# Patient Record
Sex: MALE | Race: WHITE | Employment: UNEMPLOYED | URBAN - METROPOLITAN AREA
[De-identification: names, ages, dates, MRNs, and addresses within clinical notes are randomized per-mention and may not be internally consistent; named-entity substitution may affect disease eponyms.]

---

## 2022-12-29 PROCEDURE — 99285 EMERGENCY DEPT VISIT HI MDM: CPT

## 2022-12-30 ENCOUNTER — APPOINTMENT (OUTPATIENT)
Dept: GENERAL RADIOLOGY | Age: 67
DRG: 313 | End: 2022-12-30
Payer: MEDICARE

## 2022-12-30 ENCOUNTER — HOSPITAL ENCOUNTER (INPATIENT)
Age: 67
LOS: 1 days | Discharge: HOME | DRG: 313 | End: 2023-01-02
Attending: INTERNAL MEDICINE | Admitting: INTERNAL MEDICINE
Payer: MEDICARE

## 2022-12-30 DIAGNOSIS — R07.9 CHEST PAIN, UNSPECIFIED TYPE: Primary | ICD-10-CM

## 2022-12-30 DIAGNOSIS — R77.8 ELEVATED TROPONIN: ICD-10-CM

## 2022-12-30 LAB
A/G RATIO: 1 (ref 1.1–2.2)
ALBUMIN SERPL-MCNC: 3.9 G/DL (ref 3.4–5)
ALP BLD-CCNC: 82 U/L (ref 40–129)
ALT SERPL-CCNC: 18 U/L (ref 10–40)
ANION GAP SERPL CALCULATED.3IONS-SCNC: 14 MMOL/L (ref 3–16)
AST SERPL-CCNC: 19 U/L (ref 15–37)
BASOPHILS ABSOLUTE: 0 K/UL (ref 0–0.2)
BASOPHILS RELATIVE PERCENT: 0.4 %
BILIRUB SERPL-MCNC: 0.9 MG/DL (ref 0–1)
BUN BLDV-MCNC: 19 MG/DL (ref 7–20)
CALCIUM SERPL-MCNC: 9.6 MG/DL (ref 8.3–10.6)
CHLORIDE BLD-SCNC: 96 MMOL/L (ref 99–110)
CO2: 25 MMOL/L (ref 21–32)
CREAT SERPL-MCNC: 1.1 MG/DL (ref 0.8–1.3)
EKG ATRIAL RATE: 72 BPM
EKG DIAGNOSIS: NORMAL
EKG P AXIS: 42 DEGREES
EKG P-R INTERVAL: 218 MS
EKG Q-T INTERVAL: 410 MS
EKG QRS DURATION: 94 MS
EKG QTC CALCULATION (BAZETT): 448 MS
EKG R AXIS: -14 DEGREES
EKG T AXIS: 44 DEGREES
EKG VENTRICULAR RATE: 72 BPM
EOSINOPHILS ABSOLUTE: 0.3 K/UL (ref 0–0.6)
EOSINOPHILS RELATIVE PERCENT: 3.3 %
GFR SERPL CREATININE-BSD FRML MDRD: >60 ML/MIN/{1.73_M2}
GLUCOSE BLD-MCNC: 107 MG/DL (ref 70–99)
GLUCOSE BLD-MCNC: 146 MG/DL (ref 70–99)
GLUCOSE BLD-MCNC: 84 MG/DL (ref 70–99)
GLUCOSE BLD-MCNC: 88 MG/DL (ref 70–99)
GLUCOSE BLD-MCNC: 94 MG/DL (ref 70–99)
HCT VFR BLD CALC: 45.4 % (ref 40.5–52.5)
HEMOGLOBIN: 14.8 G/DL (ref 13.5–17.5)
LYMPHOCYTES ABSOLUTE: 1.5 K/UL (ref 1–5.1)
LYMPHOCYTES RELATIVE PERCENT: 18.2 %
MCH RBC QN AUTO: 27.9 PG (ref 26–34)
MCHC RBC AUTO-ENTMCNC: 32.7 G/DL (ref 31–36)
MCV RBC AUTO: 85.5 FL (ref 80–100)
MONOCYTES ABSOLUTE: 0.7 K/UL (ref 0–1.3)
MONOCYTES RELATIVE PERCENT: 8.5 %
NEUTROPHILS ABSOLUTE: 5.9 K/UL (ref 1.7–7.7)
NEUTROPHILS RELATIVE PERCENT: 69.6 %
PDW BLD-RTO: 14.5 % (ref 12.4–15.4)
PERFORMED ON: ABNORMAL
PERFORMED ON: ABNORMAL
PERFORMED ON: NORMAL
PERFORMED ON: NORMAL
PLATELET # BLD: 261 K/UL (ref 135–450)
PMV BLD AUTO: 7.7 FL (ref 5–10.5)
POTASSIUM SERPL-SCNC: 3.5 MMOL/L (ref 3.5–5.1)
PRO-BNP: 37 PG/ML (ref 0–124)
RBC # BLD: 5.31 M/UL (ref 4.2–5.9)
SODIUM BLD-SCNC: 135 MMOL/L (ref 136–145)
TOTAL PROTEIN: 7.9 G/DL (ref 6.4–8.2)
TROPONIN: 0.02 NG/ML
WBC # BLD: 8.4 K/UL (ref 4–11)

## 2022-12-30 PROCEDURE — 84484 ASSAY OF TROPONIN QUANT: CPT

## 2022-12-30 PROCEDURE — 6370000000 HC RX 637 (ALT 250 FOR IP): Performed by: NURSE PRACTITIONER

## 2022-12-30 PROCEDURE — 83880 ASSAY OF NATRIURETIC PEPTIDE: CPT

## 2022-12-30 PROCEDURE — 85025 COMPLETE CBC W/AUTO DIFF WBC: CPT

## 2022-12-30 PROCEDURE — 6370000000 HC RX 637 (ALT 250 FOR IP): Performed by: INTERNAL MEDICINE

## 2022-12-30 PROCEDURE — 78452 HT MUSCLE IMAGE SPECT MULT: CPT | Performed by: INTERNAL MEDICINE

## 2022-12-30 PROCEDURE — 96375 TX/PRO/DX INJ NEW DRUG ADDON: CPT

## 2022-12-30 PROCEDURE — A9502 TC99M TETROFOSMIN: HCPCS | Performed by: INTERNAL MEDICINE

## 2022-12-30 PROCEDURE — 71045 X-RAY EXAM CHEST 1 VIEW: CPT

## 2022-12-30 PROCEDURE — 36415 COLL VENOUS BLD VENIPUNCTURE: CPT

## 2022-12-30 PROCEDURE — 93017 CV STRESS TEST TRACING ONLY: CPT | Performed by: INTERNAL MEDICINE

## 2022-12-30 PROCEDURE — 93005 ELECTROCARDIOGRAM TRACING: CPT | Performed by: EMERGENCY MEDICINE

## 2022-12-30 PROCEDURE — 2580000003 HC RX 258: Performed by: INTERNAL MEDICINE

## 2022-12-30 PROCEDURE — 3430000000 HC RX DIAGNOSTIC RADIOPHARMACEUTICAL: Performed by: INTERNAL MEDICINE

## 2022-12-30 PROCEDURE — 6360000002 HC RX W HCPCS: Performed by: INTERNAL MEDICINE

## 2022-12-30 PROCEDURE — 80053 COMPREHEN METABOLIC PANEL: CPT

## 2022-12-30 PROCEDURE — 93010 ELECTROCARDIOGRAM REPORT: CPT | Performed by: INTERNAL MEDICINE

## 2022-12-30 PROCEDURE — G0378 HOSPITAL OBSERVATION PER HR: HCPCS

## 2022-12-30 RX ORDER — DEXTROSE MONOHYDRATE 100 MG/ML
INJECTION, SOLUTION INTRAVENOUS CONTINUOUS PRN
Status: DISCONTINUED | OUTPATIENT
Start: 2022-12-30 | End: 2023-01-02 | Stop reason: HOSPADM

## 2022-12-30 RX ORDER — FUROSEMIDE 40 MG/1
60 TABLET ORAL 2 TIMES DAILY
COMMUNITY

## 2022-12-30 RX ORDER — METOLAZONE 2.5 MG/1
2.5 TABLET ORAL EVERY OTHER DAY
COMMUNITY

## 2022-12-30 RX ORDER — ALBUTEROL SULFATE 2.5 MG/3ML
2.5 SOLUTION RESPIRATORY (INHALATION) EVERY 4 HOURS PRN
COMMUNITY

## 2022-12-30 RX ORDER — ASPIRIN 81 MG/1
324 TABLET, CHEWABLE ORAL ONCE
Status: COMPLETED | OUTPATIENT
Start: 2022-12-30 | End: 2022-12-30

## 2022-12-30 RX ORDER — INSULIN LISPRO 100 [IU]/ML
0-4 INJECTION, SOLUTION INTRAVENOUS; SUBCUTANEOUS NIGHTLY
Status: DISCONTINUED | OUTPATIENT
Start: 2022-12-30 | End: 2023-01-02 | Stop reason: HOSPADM

## 2022-12-30 RX ORDER — SODIUM CHLORIDE 0.9 % (FLUSH) 0.9 %
5-40 SYRINGE (ML) INJECTION PRN
Status: DISCONTINUED | OUTPATIENT
Start: 2022-12-30 | End: 2023-01-02 | Stop reason: HOSPADM

## 2022-12-30 RX ORDER — ATORVASTATIN CALCIUM 40 MG/1
40 TABLET, FILM COATED ORAL DAILY
COMMUNITY

## 2022-12-30 RX ORDER — INSULIN GLARGINE 100 [IU]/ML
15 INJECTION, SOLUTION SUBCUTANEOUS NIGHTLY
COMMUNITY

## 2022-12-30 RX ORDER — HYDROXYZINE PAMOATE 50 MG/1
50 CAPSULE ORAL 3 TIMES DAILY PRN
COMMUNITY

## 2022-12-30 RX ORDER — ONDANSETRON 2 MG/ML
4 INJECTION INTRAMUSCULAR; INTRAVENOUS EVERY 6 HOURS PRN
Status: DISCONTINUED | OUTPATIENT
Start: 2022-12-30 | End: 2023-01-02 | Stop reason: HOSPADM

## 2022-12-30 RX ORDER — FLUTICASONE FUROATE, UMECLIDINIUM BROMIDE AND VILANTEROL TRIFENATATE 100; 62.5; 25 UG/1; UG/1; UG/1
1 POWDER RESPIRATORY (INHALATION) DAILY
COMMUNITY

## 2022-12-30 RX ORDER — ACETAMINOPHEN 650 MG/1
650 SUPPOSITORY RECTAL EVERY 6 HOURS PRN
Status: DISCONTINUED | OUTPATIENT
Start: 2022-12-30 | End: 2023-01-02 | Stop reason: HOSPADM

## 2022-12-30 RX ORDER — LEVOTHYROXINE SODIUM 0.07 MG/1
75 TABLET ORAL DAILY
COMMUNITY

## 2022-12-30 RX ORDER — DULAGLUTIDE 3 MG/.5ML
3 INJECTION, SOLUTION SUBCUTANEOUS WEEKLY
COMMUNITY

## 2022-12-30 RX ORDER — POLYETHYLENE GLYCOL 3350 17 G/17G
17 POWDER, FOR SOLUTION ORAL DAILY PRN
Status: DISCONTINUED | OUTPATIENT
Start: 2022-12-30 | End: 2022-12-31

## 2022-12-30 RX ORDER — TRAMADOL HYDROCHLORIDE 50 MG/1
100 TABLET ORAL ONCE
Status: COMPLETED | OUTPATIENT
Start: 2022-12-30 | End: 2022-12-30

## 2022-12-30 RX ORDER — LATANOPROST 50 UG/ML
1 SOLUTION/ DROPS OPHTHALMIC NIGHTLY
COMMUNITY

## 2022-12-30 RX ORDER — ONDANSETRON 4 MG/1
4 TABLET, ORALLY DISINTEGRATING ORAL EVERY 8 HOURS PRN
Status: DISCONTINUED | OUTPATIENT
Start: 2022-12-30 | End: 2023-01-02 | Stop reason: HOSPADM

## 2022-12-30 RX ORDER — SODIUM CHLORIDE 0.9 % (FLUSH) 0.9 %
5-40 SYRINGE (ML) INJECTION EVERY 12 HOURS SCHEDULED
Status: DISCONTINUED | OUTPATIENT
Start: 2022-12-30 | End: 2023-01-02 | Stop reason: HOSPADM

## 2022-12-30 RX ORDER — METHOCARBAMOL 750 MG/1
750 TABLET, FILM COATED ORAL EVERY 8 HOURS PRN
COMMUNITY

## 2022-12-30 RX ORDER — SODIUM CHLORIDE 9 MG/ML
INJECTION, SOLUTION INTRAVENOUS PRN
Status: DISCONTINUED | OUTPATIENT
Start: 2022-12-30 | End: 2023-01-02 | Stop reason: HOSPADM

## 2022-12-30 RX ORDER — NYSTATIN 10B UNIT
POWDER (EA) MISCELLANEOUS EVERY 6 HOURS PRN
COMMUNITY

## 2022-12-30 RX ORDER — BACITRACIN ZINC, POLYMYXIN B SULFATE, NEOMYCIN SULFATE 400; 5000; 3.5 [USP'U]/G; [USP'U]/G; MG/G
1 OINTMENT TOPICAL 2 TIMES DAILY
Status: DISCONTINUED | OUTPATIENT
Start: 2022-12-30 | End: 2023-01-02 | Stop reason: HOSPADM

## 2022-12-30 RX ORDER — AMINOPHYLLINE DIHYDRATE 25 MG/ML
100 INJECTION, SOLUTION INTRAVENOUS ONCE
Status: COMPLETED | OUTPATIENT
Start: 2022-12-30 | End: 2022-12-30

## 2022-12-30 RX ORDER — TRAMADOL HYDROCHLORIDE 50 MG/1
100 TABLET ORAL EVERY 8 HOURS PRN
Status: DISCONTINUED | OUTPATIENT
Start: 2022-12-30 | End: 2023-01-02 | Stop reason: HOSPADM

## 2022-12-30 RX ORDER — BACITRACIN, NEOMYCIN, POLYMYXIN B 400; 3.5; 5 [USP'U]/G; MG/G; [USP'U]/G
OINTMENT TOPICAL 2 TIMES DAILY
Status: DISCONTINUED | OUTPATIENT
Start: 2022-12-30 | End: 2022-12-30

## 2022-12-30 RX ORDER — LEVOTHYROXINE SODIUM 0.07 MG/1
75 TABLET ORAL DAILY
Status: DISCONTINUED | OUTPATIENT
Start: 2022-12-30 | End: 2023-01-02 | Stop reason: HOSPADM

## 2022-12-30 RX ORDER — LOPERAMIDE HCL 1 MG/7.5ML
4 SOLUTION ORAL EVERY 6 HOURS PRN
COMMUNITY

## 2022-12-30 RX ORDER — ACETAMINOPHEN 325 MG/1
650 TABLET ORAL EVERY 6 HOURS PRN
Status: DISCONTINUED | OUTPATIENT
Start: 2022-12-30 | End: 2023-01-02 | Stop reason: HOSPADM

## 2022-12-30 RX ORDER — GABAPENTIN 300 MG/1
600 CAPSULE ORAL 3 TIMES DAILY
Status: DISCONTINUED | OUTPATIENT
Start: 2022-12-30 | End: 2023-01-02 | Stop reason: HOSPADM

## 2022-12-30 RX ORDER — GLIPIZIDE 5 MG/1
5 TABLET ORAL DAILY
COMMUNITY

## 2022-12-30 RX ORDER — M-VIT,TX,IRON,MINS/CALC/FOLIC 27MG-0.4MG
1 TABLET ORAL DAILY
COMMUNITY

## 2022-12-30 RX ORDER — ONDANSETRON 4 MG/1
4 TABLET, FILM COATED ORAL EVERY 8 HOURS PRN
COMMUNITY

## 2022-12-30 RX ORDER — TRAMADOL HYDROCHLORIDE 100 MG/1
100 TABLET, COATED ORAL
COMMUNITY

## 2022-12-30 RX ORDER — ATORVASTATIN CALCIUM 40 MG/1
40 TABLET, FILM COATED ORAL DAILY
Status: DISCONTINUED | OUTPATIENT
Start: 2022-12-30 | End: 2023-01-02 | Stop reason: HOSPADM

## 2022-12-30 RX ORDER — GABAPENTIN 600 MG/1
600 TABLET ORAL 3 TIMES DAILY
COMMUNITY

## 2022-12-30 RX ORDER — INSULIN LISPRO 100 [IU]/ML
0-8 INJECTION, SOLUTION INTRAVENOUS; SUBCUTANEOUS
Status: DISCONTINUED | OUTPATIENT
Start: 2022-12-30 | End: 2023-01-02 | Stop reason: HOSPADM

## 2022-12-30 RX ORDER — INSULIN GLARGINE 100 [IU]/ML
15 INJECTION, SOLUTION SUBCUTANEOUS NIGHTLY
Status: DISCONTINUED | OUTPATIENT
Start: 2022-12-30 | End: 2023-01-02 | Stop reason: HOSPADM

## 2022-12-30 RX ORDER — LISINOPRIL 10 MG/1
10 TABLET ORAL DAILY
Status: DISCONTINUED | OUTPATIENT
Start: 2022-12-30 | End: 2023-01-02 | Stop reason: HOSPADM

## 2022-12-30 RX ORDER — LISINOPRIL 10 MG/1
10 TABLET ORAL DAILY
COMMUNITY

## 2022-12-30 RX ADMIN — TRAMADOL HYDROCHLORIDE 100 MG: 50 TABLET, COATED ORAL at 10:29

## 2022-12-30 RX ADMIN — NITROGLYCERIN 0.5 INCH: 20 OINTMENT TOPICAL at 02:13

## 2022-12-30 RX ADMIN — APIXABAN 5 MG: 5 TABLET, FILM COATED ORAL at 20:07

## 2022-12-30 RX ADMIN — GABAPENTIN 600 MG: 300 CAPSULE ORAL at 09:20

## 2022-12-30 RX ADMIN — TRAMADOL HYDROCHLORIDE 100 MG: 50 TABLET ORAL at 00:38

## 2022-12-30 RX ADMIN — BACITRACIN ZINC, NEOMYCIN, POLYMYXIN B 1 APPLICATION: 400; 3.5; 5 OINTMENT TOPICAL at 20:08

## 2022-12-30 RX ADMIN — FUROSEMIDE 60 MG: 40 TABLET ORAL at 20:07

## 2022-12-30 RX ADMIN — ATORVASTATIN CALCIUM 40 MG: 40 TABLET, FILM COATED ORAL at 09:19

## 2022-12-30 RX ADMIN — SODIUM CHLORIDE, PRESERVATIVE FREE 10 ML: 5 INJECTION INTRAVENOUS at 20:08

## 2022-12-30 RX ADMIN — AMINOPHYLLINE 100 MG: 25 INJECTION, SOLUTION INTRAVENOUS at 14:46

## 2022-12-30 RX ADMIN — LEVOTHYROXINE SODIUM 75 MCG: 0.07 TABLET ORAL at 06:05

## 2022-12-30 RX ADMIN — ASPIRIN 324 MG: 81 TABLET, CHEWABLE ORAL at 00:38

## 2022-12-30 RX ADMIN — TETROFOSMIN 10 MILLICURIE: 1.38 INJECTION, POWDER, LYOPHILIZED, FOR SOLUTION INTRAVENOUS at 13:03

## 2022-12-30 RX ADMIN — GABAPENTIN 600 MG: 300 CAPSULE ORAL at 20:07

## 2022-12-30 RX ADMIN — TETROFOSMIN 30 MILLICURIE: 1.38 INJECTION, POWDER, LYOPHILIZED, FOR SOLUTION INTRAVENOUS at 14:39

## 2022-12-30 RX ADMIN — SODIUM CHLORIDE, PRESERVATIVE FREE 10 ML: 5 INJECTION INTRAVENOUS at 09:19

## 2022-12-30 RX ADMIN — BACITRACIN ZINC, NEOMYCIN, POLYMYXIN B 1 APPLICATION: 400; 3.5; 5 OINTMENT TOPICAL at 16:21

## 2022-12-30 RX ADMIN — TRAMADOL HYDROCHLORIDE 100 MG: 50 TABLET, COATED ORAL at 18:39

## 2022-12-30 RX ADMIN — REGADENOSON 0.4 MG: 0.08 INJECTION, SOLUTION INTRAVENOUS at 14:34

## 2022-12-30 RX ADMIN — LISINOPRIL 10 MG: 10 TABLET ORAL at 09:20

## 2022-12-30 ASSESSMENT — PAIN SCALES - GENERAL
PAINLEVEL_OUTOF10: 10
PAINLEVEL_OUTOF10: 8
PAINLEVEL_OUTOF10: 6
PAINLEVEL_OUTOF10: 7
PAINLEVEL_OUTOF10: 10
PAINLEVEL_OUTOF10: 0
PAINLEVEL_OUTOF10: 7

## 2022-12-30 ASSESSMENT — ENCOUNTER SYMPTOMS
NAUSEA: 0
COLOR CHANGE: 1
DIARRHEA: 0
ABDOMINAL PAIN: 0
EYES NEGATIVE: 1
CHEST TIGHTNESS: 0
SHORTNESS OF BREATH: 0
VOMITING: 0
GASTROINTESTINAL NEGATIVE: 1
CHEST TIGHTNESS: 1

## 2022-12-30 ASSESSMENT — PAIN DESCRIPTION - DESCRIPTORS
DESCRIPTORS: ACHING
DESCRIPTORS: ACHING
DESCRIPTORS: ACHING;DISCOMFORT;DULL
DESCRIPTORS: ACHING

## 2022-12-30 ASSESSMENT — PAIN DESCRIPTION - ORIENTATION: ORIENTATION: RIGHT

## 2022-12-30 ASSESSMENT — PAIN - FUNCTIONAL ASSESSMENT
PAIN_FUNCTIONAL_ASSESSMENT: ACTIVITIES ARE NOT PREVENTED
PAIN_FUNCTIONAL_ASSESSMENT: 0-10

## 2022-12-30 ASSESSMENT — PAIN DESCRIPTION - LOCATION
LOCATION: GENERALIZED
LOCATION: LEG;NECK
LOCATION: GENERALIZED
LOCATION: KNEE;NECK;SHOULDER
LOCATION: GENERALIZED;CHEST

## 2022-12-30 ASSESSMENT — PAIN DESCRIPTION - PAIN TYPE
TYPE: ACUTE PAIN;CHRONIC PAIN
TYPE: ACUTE PAIN;CHRONIC PAIN

## 2022-12-30 NOTE — ED PROVIDER NOTES
I was asked for an EKG interpretation. Otherwise, I did not evaluate the patient. I was available for consultation. EKG  The Ekg interpreted by me in the absence of a cardiologist shows. normal sinus rhythm with a rate of 72  1st degree AV block noted  Axis is   Left axis deviation  QTc is  normal  Intervals and Durations are unremarkable. No specific ST-T wave changes appreciated. No evidence of acute ischemia.    No prior EKG available for comparison       Miranda Merritt MD  12/30/22 8354

## 2022-12-30 NOTE — PROGRESS NOTES
Patient admitted to unit with chest pain. Alert and oriented x4. Admission assessment and shift assessment completed. All needs met. Call light within reach.

## 2022-12-30 NOTE — PROGRESS NOTES
Patient admitted after midnight with episode of chest pain which occurred while he was getting upset with nursing staff for not giving him his pain medication. He also reports a 5-day history of diarrhea but denies abdominal pain. He does not think he has had any recent antibiotics. He reports feeling weak. Going for stress test, check stool studies.  DC back to snf if unremarkable         Shawn Fu PA-C

## 2022-12-30 NOTE — PLAN OF CARE
Problem: Discharge Planning  Goal: Discharge to home or other facility with appropriate resources  Outcome: Progressing  Flowsheets (Taken 12/30/2022 2105)  Discharge to home or other facility with appropriate resources:   Identify barriers to discharge with patient and caregiver   Arrange for needed discharge resources and transportation as appropriate     Problem: Pain  Goal: Verbalizes/displays adequate comfort level or baseline comfort level  Outcome: Progressing     Problem: Safety - Adult  Goal: Free from fall injury  Outcome: Progressing

## 2022-12-30 NOTE — H&P
HOSPITALISTS HISTORY AND PHYSICAL    12/30/2022 2:38 AM    Patient Information:  Danae Sheehan is a 75 y.o. male [id number]  PCP:  No primary care provider on file. (Tel: None )    Chief complaint:    Chief Complaint   Patient presents with    Chest Pain     C/O chest pain r/t untreated generalized pain. Pt states he began panicking because he was unable to get his Tramadol at [facility]. History of Present Illness:  Talia Bello is a 75 y.o. male who presents to the emergency department today from [address] [place] [address] with complaint of chest pressure, reports that he feels like \"someone is standing on my chest\". States that the chest pain began when he became agitated because the nurse at the nursing home would not give him his scheduled Ultram.  Reports that he began hollering at the nurse and then started having chest pressure and noted some pressure radiating to his right arm. .  No nausea vomiting or trouble breathing. Reports past medical history of congestive heart failure but no history of CAD or stents. Has bilateral lower extremity venous stasis  Denies abdominal pain. Review of Systems   Constitutional: Negative. HENT: Negative. Eyes: Negative. Respiratory:  Positive for chest tightness. Cardiovascular: Negative. Gastrointestinal: Negative. Skin:  Positive for color change and wound (left foot big toe). All other systems reviewed and are negative. Past Medical History:   has no past medical history on file. Past Surgical History:   has no past surgical history on file. Medications:  No current facility-administered medications on file prior to encounter.      Current Outpatient Medications on File Prior to Encounter   Medication Sig Dispense Refill    albuterol sulfate (PROAIR RESPICLICK) 068 (90 Base) MCG/ACT aerosol powder inhalation Inhale into the lungs every 4 hours as needed for Wheezing or Shortness of Breath      albuterol (PROVENTIL) (2.5 MG/3ML) 0.083% nebulizer solution Take 2.5 mg by nebulization every 4 hours as needed for Wheezing      apixaban (ELIQUIS) 5 MG TABS tablet Take by mouth 2 times daily      atorvastatin (LIPITOR) 40 MG tablet Take 40 mg by mouth daily At bedtime      furosemide (LASIX) 40 MG tablet Take 60 mg by mouth 2 times daily      gabapentin (NEURONTIN) 600 MG tablet Take 600 mg by mouth 3 times daily.       glipiZIDE (GLUCOTROL) 5 MG tablet Take 5 mg by mouth daily      loperamide (IMODIUM) 1 MG/7.5ML LIQD solution Take 4 mg by mouth every 6 hours as needed for Diarrhea (15ml)      empagliflozin (JARDIANCE) 25 MG tablet Take 25 mg by mouth daily      insulin glargine (LANTUS) 100 UNIT/ML injection vial Inject 15 Units into the skin nightly      latanoprost (XALATAN) 0.005 % ophthalmic solution 1 drop nightly      levothyroxine (SYNTHROID) 75 MCG tablet Take 75 mcg by mouth Daily      lisinopril (PRINIVIL;ZESTRIL) 10 MG tablet Take 10 mg by mouth daily      Melatonin 5 MG CAPS Take by mouth      metFORMIN (GLUCOPHAGE) 500 MG tablet Take 500 mg by mouth 2 times daily (with meals)      methocarbamol (ROBAXIN) 750 MG tablet Take 750 mg by mouth every 8 hours as needed 2 tabs      metOLazone (ZAROXOLYN) 2.5 MG tablet Take 2.5 mg by mouth every other day X10 days end 1/6/23      Multiple Vitamins-Minerals (THERAPEUTIC MULTIVITAMIN-MINERALS) tablet Take 1 tablet by mouth daily      nystatin (MYCOSTATIN) POWD powder Apply topically every 6 hours as needed To groin for yeast      traMADol HCl 100 MG TABS Take 100 mg by mouth Every 8 hours PRN      fluticasone-umeclidin-vilant (TRELEGY ELLIPTA) 100-62.5-25 MCG/ACT AEPB inhaler Inhale 1 puff into the lungs daily      Dulaglutide (TRULICITY) 3 GT/7.1BI SOPN Inject 3 mg into the skin once a week Weekly on Friday      hydrOXYzine pamoate (VISTARIL) 50 MG capsule Take 50 mg by mouth 3 times daily as needed for Itching      ondansetron (ZOFRAN) 4 MG tablet Take 4 mg by mouth every 8 hours as needed for Nausea or Vomiting         Allergies:  No Known Allergies     Social History:  [facility]   reports that he has quit smoking. His smoking use included cigarettes. He has never used smokeless tobacco. He reports that he does not drink alcohol and does not use drugs. Family History:  family history is not on file. ,     Physical Exam:  /87   Pulse 84   Temp 97.6 °F (36.4 °C) (Oral)   Resp 16   SpO2 97%     Physical Exam  Vitals and nursing note reviewed. Constitutional:       Appearance: Normal appearance. HENT:      Head: Normocephalic and atraumatic. Cardiovascular:      Rate and Rhythm: Normal rate and regular rhythm. Pulmonary:      Effort: Pulmonary effort is normal.   Musculoskeletal:         General: Swelling (b/l LE) present. Skin:     Comments: Chronic venous stasis in both legs and feet   Neurological:      General: No focal deficit present. Mental Status: He is alert. Mental status is at baseline. Psychiatric:         Mood and Affect: Mood normal.         Behavior: Behavior normal.         Thought Content:  Thought content normal.         Labs:  CBC:   Lab Results   Component Value Date/Time    WBC 8.4 12/30/2022 12:30 AM    RBC 5.31 12/30/2022 12:30 AM    HGB 14.8 12/30/2022 12:30 AM    HCT 45.4 12/30/2022 12:30 AM    MCV 85.5 12/30/2022 12:30 AM    MCH 27.9 12/30/2022 12:30 AM    MCHC 32.7 12/30/2022 12:30 AM    RDW 14.5 12/30/2022 12:30 AM     12/30/2022 12:30 AM    MPV 7.7 12/30/2022 12:30 AM     BMP:    Lab Results   Component Value Date/Time     12/30/2022 12:30 AM    K 3.5 12/30/2022 12:30 AM    CL 96 12/30/2022 12:30 AM    CO2 25 12/30/2022 12:30 AM    BUN 19 12/30/2022 12:30 AM    CREATININE 1.1 12/30/2022 12:30 AM    CALCIUM 9.6 12/30/2022 12:30 AM    LABGLOM >60 12/30/2022 12:30 AM    GLUCOSE 94 12/30/2022 12:30 AM     XR CHEST PORTABLE Final Result   Vascular congestion. Recent imaging reviewed    Problem List  Active Problems:    Chest pain, rule out acute myocardial infarction  Resolved Problems:    * No resolved hospital problems. *  Type 2 diabetes  Chronic diastolic CHF  Hyperlipidemia  Chronic venous stasis  History of DVT  Hypothyroidism    Assessment/Plan:   Admit to medical floor under hospitalist service  Cycle troponins. Nuclear stress test in AM.  N.p.o. for stress test  Hold home metformin and Jardiance. Lantus and sliding scale continued. Adjust as needed  Continue home Lasix. Her intake and output. Patient reports history of CHF but proBNP normal and does not appear in volume overload  Consider cardiology evaluation  Continue home antihypertensive therapy  Continue Eliquis    DVT prophylaxis Eliquis  Code status full code        Admit as inpatient I anticipate hospitalization spanning more than two midnights for investigation and treatment of the above medically necessary diagnoses. Please note that some part of this chart was generated using Dragon dictation software. Although every effort was made to ensure the accuracy of this automated transcription, some errors in transcription may have occurred inadvertently. If you may need any clarification, please do not hesitate to contact me through [facility].        Fiorella Dorsey MD    12/30/2022 2:38 AM

## 2022-12-30 NOTE — CARE COORDINATION
Discharge Planning Assessment    RN discharge planner spoke with Diana Leo in admissions at [phone]     Pt status: [x]  LTC []  SNF []  AL  []  IL     Bed Status:  Yes    Pre-cert required:  No    Level of function/Support:   facility staff assist    Additional Information: Can go back when Medicall stable      Transportation at the time of discharge:   Medical transportation .

## 2022-12-30 NOTE — ED PROVIDER NOTES
[facility]        Pt Name: Micah Knight  MRN: [id number]  [place] 1955  Date of evaluation: 12/29/2022  Provider: JASON Fischer CNP  PCP: No primary care provider on file. Note Started: 1:42 AM EST 12/30/22      ALICIA. I have evaluated this patient. My supervising physician was available for consultation. CHIEF COMPLAINT       Chief Complaint   Patient presents with    Chest Pain     C/O chest pain r/t untreated generalized pain. Pt states he began panicking because he was unable to get his Tramadol at [facility]. HISTORY OF PRESENT ILLNESS: 1 or more Elements     History from : Patient    Limitations to history : None    Priscilla Poole is a 75 y.o. male who presents to the emergency department today from [address] [place] [address] with complaint of chest pressure, reports that he feels like \"someone is standing on my chest\". States that the chest pain began when he became excited/stressed because the nurse at the nursing home would not give him his scheduled Ultram.  Reports that he was in bed crying when the chest pain began. States the pain has been constant since time of onset without mitigating or exacerbating factors. Reports history of congestive heart failure but no history of heart attack. We do have limited information on this patient as there is a Social Security number mismatch. Patient is able to provide his true so security number today with his Social Security card and paperwork from the nursing home, however the records available with patient of same name and same date of birth has a 1 digit different Social Security number in epic, unable to merged charts. Patient states that he was admitted to hospital in [place] about 4 weeks ago and discharged with pneumonia. Acute chest pain began tonight.     Nursing Notes were all reviewed and agreed with or any disagreements were addressed in the HPI.    REVIEW OF SYSTEMS :      Review of Systems   Constitutional:  Negative for activity change, chills and fever. Respiratory:  Negative for chest tightness and shortness of breath. Cardiovascular:  Positive for chest pain. Gastrointestinal:  Negative for abdominal pain, diarrhea, nausea and vomiting. Genitourinary:  Negative for dysuria. All other systems reviewed and are negative. Positives and Pertinent negatives as per HPI. SURGICAL HISTORY   History reviewed. No pertinent surgical history. CURRENTMEDICATIONS       Previous Medications    ALBUTEROL SULFATE (PROAIR RESPICLICK) 587 (90 BASE) MCG/ACT AEROSOL POWDER INHALATION    Inhale into the lungs every 4 hours as needed for Wheezing or Shortness of Breath       ALLERGIES     Patient has no known allergies. FAMILYHISTORY     History reviewed. No pertinent family history. SOCIAL HISTORY       Social History     Tobacco Use    Smoking status: Former     Types: Cigarettes    Smokeless tobacco: Never   Substance Use Topics    Alcohol use: Never    Drug use: Never       SCREENINGS        [place] Coma Scale  Eye Opening: Spontaneous  Best Verbal Response: Oriented  Best Motor Response: Obeys commands  [place] Coma Scale Score: 15                CIWA Assessment  BP: 126/76  Heart Rate: 76           PHYSICAL EXAM  1 or more Elements     ED Triage Vitals [12/30/22 0017]   BP Temp Temp Source Heart Rate Resp SpO2 Height Weight   126/76 97.6 °F (36.4 °C) Oral 76 16 97 % -- --       Physical Exam  Vitals and nursing note reviewed. Constitutional:       Appearance: He is well-developed. He is obese. He is not diaphoretic. HENT:      Head: Normocephalic and atraumatic. Right Ear: External ear normal.      Left Ear: External ear normal.   Eyes:      General:         Right eye: No discharge. Left eye: No discharge. Neck:      Vascular: No JVD. Cardiovascular:      Rate and Rhythm: Normal rate. Pulses: Normal pulses. Heart sounds: Normal heart sounds. Pulmonary:      Effort: Pulmonary effort is normal. No respiratory distress. Breath sounds: Normal breath sounds. Abdominal:      Palpations: Abdomen is soft. Tenderness: There is no abdominal tenderness. Musculoskeletal:         General: Normal range of motion. Skin:     General: Skin is warm and dry. Coloration: Skin is not pale. Neurological:      Mental Status: He is alert. Psychiatric:         Behavior: Behavior normal.           DIAGNOSTIC RESULTS   LABS:    Labs Reviewed   COMPREHENSIVE METABOLIC PANEL - Abnormal; Notable for the following components:       Result Value    Sodium 135 (*)     Chloride 96 (*)     Albumin/Globulin Ratio 1.0 (*)     All other components within normal limits   TROPONIN - Abnormal; Notable for the following components:    Troponin 0.02 (*)     All other components within normal limits   CBC WITH AUTO DIFFERENTIAL   BRAIN NATRIURETIC PEPTIDE       When ordered only abnormal lab results are displayed. All other labs were within normal range or not returned as of this dictation. EKG: When ordered, EKG's are interpreted by the Emergency Department Physician in the absence of a cardiologist.  Please see their note for interpretation of EKG. RADIOLOGY:   Non-plain film images such as CT, Ultrasound and MRI are read by the radiologist. Plain radiographic images are visualized and preliminarily interpreted by the ED Provider with the below findings:        Interpretation per the Radiologist below, if available at the time of this note:    XR CHEST PORTABLE    (Results Pending)     No results found. No results found. PROCEDURES   Unless otherwise noted below, none     Procedures    CRITICAL CARE TIME (.cctime)       PAST MEDICAL HISTORY      has no past medical history on file.      Chronic Conditions affecting Care: unknown    EMERGENCY DEPARTMENT COURSE and DIFFERENTIAL DIAGNOSIS/MDM:   Vitals:    Vitals:    12/30/22 0017   BP: 126/76   Pulse: 76   Resp: 16   Temp: 97.6 °F (36.4 °C)   TempSrc: Oral   SpO2: 97%       Patient was given the following medications:  Medications   nitroglycerin (NITRO-BID) 2 % ointment 0.5 inch (has no administration in time range)   traMADol (ULTRAM) tablet 100 mg (100 mg Oral Given 12/30/22 0038)   aspirin chewable tablet 324 mg (324 mg Oral Not Given 12/30/22 0039)             Is this patient to be included in the SEP-1 Core Measure due to severe sepsis or septic shock? No   Exclusion criteria - the patient is NOT to be included for SEP-1 Core Measure due to: Infection is not suspected    CONSULTS: (Who and What was discussed)  None  Discussion with Other Profesionals : Admitting Team hospitalist team    Social Determinants : none    Records Reviewed : None    CC/HPI Summary, DDx, ED Course, and Reassessment:     Briefly, this is a 75year old male who presents to the emergency department today from [address] [place] [address] with complaint of chest pressure, reports that he feels like \"someone is standing on my chest\". States that the chest pain began when he became excited/stressed because the nurse at the nursing home would not give him his scheduled Ultram.  Reports that he was in bed crying when the chest pain began. States the pain has been constant since time of onset without mitigating or exacerbating factors. Reports history of congestive heart failure but no history of heart attack. We do have limited information on this patient as there is a Social Security number mismatch. Patient is able to provide his true so security number today with his Social Security card and paperwork from the nursing home, however the records available with patient of same name and same date of birth has a 1 digit different Social Security number in epic, unable to merged charts. Patient states that he was admitted to hospital in [place] about 4 weeks ago and discharged with pneumonia.     He refused aspirin the ER with multiple routes offered. Labs show elevated troponin 0.02, ekg reviewed by attending physician. Labs are otherwise unremarkable. Unable to review any past cardiac studies. Patient will be admitted for acute chest pain. Disposition Considerations (include 1 Tests not done, Shared Decision Making, Pt Expectation of Test or Tx.): considered dimer, however patient is not sob or hypoxic. Admitted to hospitalist team.      I am the Primary Clinician of Record. FINAL IMPRESSION      1. Chest pain, unspecified type    2. Elevated troponin          DISPOSITION/PLAN     DISPOSITION Decision To Admit 12/30/2022 01:42:29 AM      PATIENT REFERRED TO:  No follow-up provider specified.     DISCHARGE MEDICATIONS:  New Prescriptions    No medications on file       DISCONTINUED MEDICATIONS:  Discontinued Medications    No medications on file              (Please note that portions of this note were completed with a voice recognition program.  Efforts were made to edit the dictations but occasionally words are mis-transcribed.)    JASON Ahuja CNP (electronically signed)            JASON Ahuja CNP  12/30/22 7 JASON Yoo CNP  12/30/22 0153

## 2022-12-30 NOTE — PROGRESS NOTES
Instructed on Lexiscan Stress Test Procedure including possible side effects/ adverse reactions. Patient verbalizes  understanding and denies having any questions . See [address] [place] [address] Cardiology

## 2022-12-30 NOTE — ED NOTES
Report given to [facility] and pt transported to 3A in stable condition on bedside monitor and 2L o2 via [address] [place], RN  12/30/22 7889

## 2022-12-30 NOTE — CONSULTS
Chart reviewed  EKG reviewed, no worrisome findings, Troponin trend flat, not consistent with ACS  Ok to proceed with stress test today

## 2022-12-31 ENCOUNTER — APPOINTMENT (OUTPATIENT)
Dept: CT IMAGING | Age: 67
DRG: 313 | End: 2022-12-31
Payer: MEDICARE

## 2022-12-31 ENCOUNTER — APPOINTMENT (OUTPATIENT)
Dept: GENERAL RADIOLOGY | Age: 67
DRG: 313 | End: 2022-12-31
Payer: MEDICARE

## 2022-12-31 LAB
ANION GAP SERPL CALCULATED.3IONS-SCNC: 9 MMOL/L (ref 3–16)
BACTERIA: ABNORMAL /HPF
BASOPHILS ABSOLUTE: 0 K/UL (ref 0–0.2)
BASOPHILS RELATIVE PERCENT: 0.3 %
BILIRUBIN URINE: NEGATIVE
BLOOD, URINE: NEGATIVE
BUN BLDV-MCNC: 16 MG/DL (ref 7–20)
CALCIUM SERPL-MCNC: 9.2 MG/DL (ref 8.3–10.6)
CHLORIDE BLD-SCNC: 98 MMOL/L (ref 99–110)
CLARITY: ABNORMAL
CO2: 28 MMOL/L (ref 21–32)
COLOR: YELLOW
CREAT SERPL-MCNC: 0.9 MG/DL (ref 0.8–1.3)
EKG ATRIAL RATE: 63 BPM
EKG DIAGNOSIS: NORMAL
EKG P AXIS: 70 DEGREES
EKG P-R INTERVAL: 228 MS
EKG Q-T INTERVAL: 438 MS
EKG QRS DURATION: 92 MS
EKG QTC CALCULATION (BAZETT): 448 MS
EKG R AXIS: -15 DEGREES
EKG T AXIS: 32 DEGREES
EKG VENTRICULAR RATE: 63 BPM
EOSINOPHILS ABSOLUTE: 0.3 K/UL (ref 0–0.6)
EOSINOPHILS RELATIVE PERCENT: 3.9 %
EPITHELIAL CELLS, UA: 0 /HPF (ref 0–5)
GFR SERPL CREATININE-BSD FRML MDRD: >60 ML/MIN/{1.73_M2}
GLUCOSE BLD-MCNC: 105 MG/DL (ref 70–99)
GLUCOSE BLD-MCNC: 115 MG/DL (ref 70–99)
GLUCOSE BLD-MCNC: 116 MG/DL (ref 70–99)
GLUCOSE BLD-MCNC: 128 MG/DL (ref 70–99)
GLUCOSE BLD-MCNC: 131 MG/DL (ref 70–99)
GLUCOSE URINE: 250 MG/DL
HCT VFR BLD CALC: 46.2 % (ref 40.5–52.5)
HEMOGLOBIN: 14.9 G/DL (ref 13.5–17.5)
HYALINE CASTS: 12 /LPF (ref 0–8)
KETONES, URINE: NEGATIVE MG/DL
LEUKOCYTE ESTERASE, URINE: ABNORMAL
LYMPHOCYTES ABSOLUTE: 0.9 K/UL (ref 1–5.1)
LYMPHOCYTES RELATIVE PERCENT: 11.9 %
MCH RBC QN AUTO: 27.7 PG (ref 26–34)
MCHC RBC AUTO-ENTMCNC: 32.3 G/DL (ref 31–36)
MCV RBC AUTO: 85.6 FL (ref 80–100)
MICROSCOPIC EXAMINATION: YES
MONOCYTES ABSOLUTE: 0.6 K/UL (ref 0–1.3)
MONOCYTES RELATIVE PERCENT: 7.2 %
NEUTROPHILS ABSOLUTE: 5.9 K/UL (ref 1.7–7.7)
NEUTROPHILS RELATIVE PERCENT: 76.7 %
NITRITE, URINE: NEGATIVE
PDW BLD-RTO: 14.4 % (ref 12.4–15.4)
PERFORMED ON: ABNORMAL
PH UA: 8.5 (ref 5–8)
PLATELET # BLD: 245 K/UL (ref 135–450)
PMV BLD AUTO: 7.7 FL (ref 5–10.5)
POTASSIUM REFLEX MAGNESIUM: 4.4 MMOL/L (ref 3.5–5.1)
PROTEIN UA: 30 MG/DL
RBC # BLD: 5.39 M/UL (ref 4.2–5.9)
RBC UA: 0 /HPF (ref 0–4)
SODIUM BLD-SCNC: 135 MMOL/L (ref 136–145)
SPECIFIC GRAVITY UA: 1.02 (ref 1–1.03)
TRIPLE PHOSPHATE CRYSTALS: PRESENT
URINE REFLEX TO CULTURE: YES
URINE TYPE: ABNORMAL
UROBILINOGEN, URINE: 0.2 E.U./DL
WBC # BLD: 7.7 K/UL (ref 4–11)
WBC UA: 93 /HPF (ref 0–5)

## 2022-12-31 PROCEDURE — 87186 SC STD MICRODIL/AGAR DIL: CPT

## 2022-12-31 PROCEDURE — 96365 THER/PROPH/DIAG IV INF INIT: CPT

## 2022-12-31 PROCEDURE — 74018 RADEX ABDOMEN 1 VIEW: CPT

## 2022-12-31 PROCEDURE — G0378 HOSPITAL OBSERVATION PER HR: HCPCS

## 2022-12-31 PROCEDURE — 2580000003 HC RX 258: Performed by: INTERNAL MEDICINE

## 2022-12-31 PROCEDURE — 80048 BASIC METABOLIC PNL TOTAL CA: CPT

## 2022-12-31 PROCEDURE — 85025 COMPLETE CBC W/AUTO DIFF WBC: CPT

## 2022-12-31 PROCEDURE — 74176 CT ABD & PELVIS W/O CONTRAST: CPT

## 2022-12-31 PROCEDURE — 81001 URINALYSIS AUTO W/SCOPE: CPT

## 2022-12-31 PROCEDURE — 2580000003 HC RX 258: Performed by: PHYSICIAN ASSISTANT

## 2022-12-31 PROCEDURE — 6360000002 HC RX W HCPCS: Performed by: PHYSICIAN ASSISTANT

## 2022-12-31 PROCEDURE — 87086 URINE CULTURE/COLONY COUNT: CPT

## 2022-12-31 PROCEDURE — 36415 COLL VENOUS BLD VENIPUNCTURE: CPT

## 2022-12-31 PROCEDURE — 93010 ELECTROCARDIOGRAM REPORT: CPT | Performed by: INTERNAL MEDICINE

## 2022-12-31 PROCEDURE — 93005 ELECTROCARDIOGRAM TRACING: CPT | Performed by: INTERNAL MEDICINE

## 2022-12-31 PROCEDURE — 6370000000 HC RX 637 (ALT 250 FOR IP): Performed by: INTERNAL MEDICINE

## 2022-12-31 PROCEDURE — 6370000000 HC RX 637 (ALT 250 FOR IP): Performed by: PHYSICIAN ASSISTANT

## 2022-12-31 PROCEDURE — 87077 CULTURE AEROBIC IDENTIFY: CPT

## 2022-12-31 RX ORDER — POLYETHYLENE GLYCOL 3350 17 G/17G
17 POWDER, FOR SOLUTION ORAL DAILY
Status: DISCONTINUED | OUTPATIENT
Start: 2022-12-31 | End: 2023-01-02 | Stop reason: HOSPADM

## 2022-12-31 RX ADMIN — GABAPENTIN 600 MG: 300 CAPSULE ORAL at 15:02

## 2022-12-31 RX ADMIN — POLYETHYLENE GLYCOL 3350 17 G: 17 POWDER, FOR SOLUTION ORAL at 15:02

## 2022-12-31 RX ADMIN — GABAPENTIN 600 MG: 300 CAPSULE ORAL at 21:01

## 2022-12-31 RX ADMIN — TRAMADOL HYDROCHLORIDE 100 MG: 50 TABLET, COATED ORAL at 09:48

## 2022-12-31 RX ADMIN — ATORVASTATIN CALCIUM 40 MG: 40 TABLET, FILM COATED ORAL at 09:47

## 2022-12-31 RX ADMIN — GABAPENTIN 600 MG: 300 CAPSULE ORAL at 09:48

## 2022-12-31 RX ADMIN — SODIUM CHLORIDE, PRESERVATIVE FREE 10 ML: 5 INJECTION INTRAVENOUS at 09:50

## 2022-12-31 RX ADMIN — FUROSEMIDE 60 MG: 40 TABLET ORAL at 09:49

## 2022-12-31 RX ADMIN — APIXABAN 5 MG: 5 TABLET, FILM COATED ORAL at 21:01

## 2022-12-31 RX ADMIN — BACITRACIN ZINC, NEOMYCIN, POLYMYXIN B 1 APPLICATION: 400; 3.5; 5 OINTMENT TOPICAL at 09:56

## 2022-12-31 RX ADMIN — FUROSEMIDE 60 MG: 40 TABLET ORAL at 21:01

## 2022-12-31 RX ADMIN — SODIUM CHLORIDE, PRESERVATIVE FREE 10 ML: 5 INJECTION INTRAVENOUS at 21:01

## 2022-12-31 RX ADMIN — LEVOTHYROXINE SODIUM 75 MCG: 0.07 TABLET ORAL at 05:54

## 2022-12-31 RX ADMIN — BACITRACIN ZINC, NEOMYCIN, POLYMYXIN B 1 APPLICATION: 400; 3.5; 5 OINTMENT TOPICAL at 21:01

## 2022-12-31 RX ADMIN — APIXABAN 5 MG: 5 TABLET, FILM COATED ORAL at 09:49

## 2022-12-31 RX ADMIN — CEFTRIAXONE SODIUM 1000 MG: 1 INJECTION, POWDER, FOR SOLUTION INTRAMUSCULAR; INTRAVENOUS at 13:43

## 2022-12-31 ASSESSMENT — PAIN SCALES - GENERAL
PAINLEVEL_OUTOF10: 7
PAINLEVEL_OUTOF10: 0
PAINLEVEL_OUTOF10: 7
PAINLEVEL_OUTOF10: 5
PAINLEVEL_OUTOF10: 7
PAINLEVEL_OUTOF10: 0

## 2022-12-31 ASSESSMENT — PAIN DESCRIPTION - LOCATION
LOCATION: KNEE;NECK
LOCATION: KNEE;NECK

## 2022-12-31 NOTE — PROGRESS NOTES
Pt assisted to chair to eat dinner. Chair alarm in place, fall precautions in place. No BM this shift.

## 2022-12-31 NOTE — CARE COORDINATION
Discharge Planning: RN CM spoke to [facility] for [place]. Informed her that patient was concerned about losing his room / bed if he stayed in hospital 2-3 days. Harmony Mead assured me that patient's room would still be available for him after his hospital stay. Attending MD informed.   Jessa Garcia RN

## 2022-12-31 NOTE — PROGRESS NOTES
[facility] PROGRESS NOTE    12/31/2022 2:21 PM        Name: Fredrick Ashby . Admitted: 12/30/2022  Primary Care Provider: No primary care provider on file. (Tel: None)      Subjective:  . Patient feeling ok. No chest pain. He is having a lot of dysuria that started yesterday after stress test. No history of UTI.      Reviewed interval ancillary notes    Current Medications  cefTRIAXone (ROCEPHIN) 1,000 mg in dextrose 5 % 50 mL IVPB mini-bag, Q24H  apixaban (ELIQUIS) tablet 5 mg, BID  atorvastatin (LIPITOR) tablet 40 mg, Daily  furosemide (LASIX) tablet 60 mg, BID  gabapentin (NEURONTIN) capsule 600 mg, TID  insulin glargine (LANTUS) injection vial 15 Units, Nightly  levothyroxine (SYNTHROID) tablet 75 mcg, Daily  lisinopril (PRINIVIL;ZESTRIL) tablet 10 mg, Daily  traMADol (ULTRAM) tablet 100 mg, Q8H PRN  sodium chloride flush 0.9 % injection 5-40 mL, 2 times per day  sodium chloride flush 0.9 % injection 5-40 mL, PRN  0.9 % sodium chloride infusion, PRN  ondansetron (ZOFRAN-ODT) disintegrating tablet 4 mg, Q8H PRN   Or  ondansetron (ZOFRAN) injection 4 mg, Q6H PRN  acetaminophen (TYLENOL) tablet 650 mg, Q6H PRN   Or  acetaminophen (TYLENOL) suppository 650 mg, Q6H PRN  polyethylene glycol (GLYCOLAX) packet 17 g, Daily PRN  dextrose bolus 10% 125 mL, PRN   Or  dextrose bolus 10% 250 mL, PRN  glucagon (rDNA) injection 1 mg, PRN  dextrose 10 % infusion, Continuous PRN  insulin lispro (HUMALOG) injection vial 0-8 Units, TID WC  insulin lispro (HUMALOG) injection vial 0-4 Units, Nightly  Triple Antibiotic First Aid [phone] OINT 1 application, BID        Objective:  BP 95/61   Pulse 68   Temp 97.7 °F (36.5 °C) (Oral)   Resp 16   Ht 5' 11.25\" (1.81 m)   Wt (!) 333 lb 1.6 oz (151.1 kg)   SpO2 (!) 88%   BMI 46.13 kg/m²     Intake/Output Summary (Last 24 hours) at 12/31/2022 1421  Last data filed at 12/31/2022 4217  Gross per 24 hour   Intake --   Output 1125 ml   Net -1125 ml      Wt Readings from Last 3 Encounters:   12/31/22 (!) 333 lb 1.6 oz (151.1 kg)       General appearance:  Appears comfortable  Eyes: Sclera clear. Pupils equal.  ENT: Moist oral mucosa. Trachea midline, no adenopathy. Cardiovascular: Regular rhythm, normal S1, S2. No murmur. No edema in lower extremities  Respiratory: Not using accessory muscles. Good inspiratory effort. Clear to auscultation bilaterally, no wheeze or crackles. GI: Abdomen soft, no tenderness, not distended, normal bowel sounds  Musculoskeletal: No cyanosis in digits, neck supple  Neurology: CN 2-12 grossly intact. No speech or motor deficits  Psych: Normal affect. Alert and oriented in time, place and person  Skin: Warm, dry, normal turgor    Labs and Tests:  CBC:   Recent Labs     12/30/22  0030 12/31/22  1034   WBC 8.4 7.7   HGB 14.8 14.9    245     BMP:    Recent Labs     12/30/22  0030 12/31/22  1034   * 135*   K 3.5 4.4   CL 96* 98*   CO2 25 28   BUN 19 16   CREATININE 1.1 0.9   GLUCOSE 94 131*     Hepatic:   Recent Labs     12/30/22  0030   AST 19   ALT 18   BILITOT 0.9   ALKPHOS 82         Problem List  Active Problems:    Chest pain, rule out acute myocardial infarction  Resolved Problems:    * No resolved hospital problems. *       Assessment & Plan:   Chest pain-resolved. Stress test normal  UTI-start rocephin. Await cultures. AXR to r/o stones give crystals in urine. Chronic respiratory failure-on baseline requirement        Diet: ADULT DIET;  Regular; 4 carb choices (60 gm/meal)  Code:Full Code  DVT PPX: dari Fried PA-C   12/31/2022 2:21 PM

## 2022-12-31 NOTE — PROGRESS NOTES
Pt reports severe urethral burning when urinating. Urine cloudy and malodorous. Sent Urine sample to lab and contacted Hospitalist.  See orders.

## 2023-01-01 PROBLEM — R07.9 CHEST PAIN: Status: ACTIVE | Noted: 2023-01-01

## 2023-01-01 LAB
ANION GAP SERPL CALCULATED.3IONS-SCNC: 8 MMOL/L (ref 3–16)
BASOPHILS ABSOLUTE: 0 K/UL (ref 0–0.2)
BASOPHILS RELATIVE PERCENT: 0.5 %
BUN BLDV-MCNC: 17 MG/DL (ref 7–20)
CALCIUM SERPL-MCNC: 9.6 MG/DL (ref 8.3–10.6)
CHLORIDE BLD-SCNC: 101 MMOL/L (ref 99–110)
CO2: 30 MMOL/L (ref 21–32)
CREAT SERPL-MCNC: 0.9 MG/DL (ref 0.8–1.3)
EOSINOPHILS ABSOLUTE: 0.3 K/UL (ref 0–0.6)
EOSINOPHILS RELATIVE PERCENT: 4 %
GFR SERPL CREATININE-BSD FRML MDRD: >60 ML/MIN/{1.73_M2}
GLUCOSE BLD-MCNC: 102 MG/DL (ref 70–99)
GLUCOSE BLD-MCNC: 103 MG/DL (ref 70–99)
GLUCOSE BLD-MCNC: 87 MG/DL (ref 70–99)
GLUCOSE BLD-MCNC: 87 MG/DL (ref 70–99)
GLUCOSE BLD-MCNC: 96 MG/DL (ref 70–99)
HCT VFR BLD CALC: 46.7 % (ref 40.5–52.5)
HEMOGLOBIN: 15.1 G/DL (ref 13.5–17.5)
LYMPHOCYTES ABSOLUTE: 1 K/UL (ref 1–5.1)
LYMPHOCYTES RELATIVE PERCENT: 11.9 %
MCH RBC QN AUTO: 27.6 PG (ref 26–34)
MCHC RBC AUTO-ENTMCNC: 32.3 G/DL (ref 31–36)
MCV RBC AUTO: 85.5 FL (ref 80–100)
MONOCYTES ABSOLUTE: 0.6 K/UL (ref 0–1.3)
MONOCYTES RELATIVE PERCENT: 7.8 %
NEUTROPHILS ABSOLUTE: 6.2 K/UL (ref 1.7–7.7)
NEUTROPHILS RELATIVE PERCENT: 75.8 %
PDW BLD-RTO: 14.2 % (ref 12.4–15.4)
PERFORMED ON: ABNORMAL
PERFORMED ON: ABNORMAL
PERFORMED ON: NORMAL
PERFORMED ON: NORMAL
PLATELET # BLD: 264 K/UL (ref 135–450)
PMV BLD AUTO: 7.5 FL (ref 5–10.5)
POTASSIUM SERPL-SCNC: 3.9 MMOL/L (ref 3.5–5.1)
RBC # BLD: 5.47 M/UL (ref 4.2–5.9)
SODIUM BLD-SCNC: 139 MMOL/L (ref 136–145)
WBC # BLD: 8.2 K/UL (ref 4–11)

## 2023-01-01 PROCEDURE — 85025 COMPLETE CBC W/AUTO DIFF WBC: CPT

## 2023-01-01 PROCEDURE — 6370000000 HC RX 637 (ALT 250 FOR IP): Performed by: INTERNAL MEDICINE

## 2023-01-01 PROCEDURE — 96361 HYDRATE IV INFUSION ADD-ON: CPT

## 2023-01-01 PROCEDURE — 36415 COLL VENOUS BLD VENIPUNCTURE: CPT

## 2023-01-01 PROCEDURE — 2580000003 HC RX 258: Performed by: INTERNAL MEDICINE

## 2023-01-01 PROCEDURE — 80048 BASIC METABOLIC PNL TOTAL CA: CPT

## 2023-01-01 PROCEDURE — G0378 HOSPITAL OBSERVATION PER HR: HCPCS

## 2023-01-01 PROCEDURE — 1200000000 HC SEMI PRIVATE

## 2023-01-01 PROCEDURE — 6360000002 HC RX W HCPCS: Performed by: PHYSICIAN ASSISTANT

## 2023-01-01 PROCEDURE — 2580000003 HC RX 258: Performed by: PHYSICIAN ASSISTANT

## 2023-01-01 PROCEDURE — 6370000000 HC RX 637 (ALT 250 FOR IP): Performed by: PHYSICIAN ASSISTANT

## 2023-01-01 RX ADMIN — FUROSEMIDE 60 MG: 40 TABLET ORAL at 08:29

## 2023-01-01 RX ADMIN — LEVOTHYROXINE SODIUM 75 MCG: 0.07 TABLET ORAL at 05:16

## 2023-01-01 RX ADMIN — SODIUM CHLORIDE: 9 INJECTION, SOLUTION INTRAVENOUS at 15:36

## 2023-01-01 RX ADMIN — FUROSEMIDE 60 MG: 40 TABLET ORAL at 21:18

## 2023-01-01 RX ADMIN — LISINOPRIL 10 MG: 10 TABLET ORAL at 08:30

## 2023-01-01 RX ADMIN — CEFTRIAXONE SODIUM 1000 MG: 1 INJECTION, POWDER, FOR SOLUTION INTRAMUSCULAR; INTRAVENOUS at 15:37

## 2023-01-01 RX ADMIN — BACITRACIN ZINC, NEOMYCIN, POLYMYXIN B 1 APPLICATION: 400; 3.5; 5 OINTMENT TOPICAL at 08:30

## 2023-01-01 RX ADMIN — APIXABAN 5 MG: 5 TABLET, FILM COATED ORAL at 08:29

## 2023-01-01 RX ADMIN — GABAPENTIN 600 MG: 300 CAPSULE ORAL at 08:29

## 2023-01-01 RX ADMIN — BACITRACIN ZINC, NEOMYCIN, POLYMYXIN B 1 APPLICATION: 400; 3.5; 5 OINTMENT TOPICAL at 21:19

## 2023-01-01 RX ADMIN — APIXABAN 5 MG: 5 TABLET, FILM COATED ORAL at 21:18

## 2023-01-01 RX ADMIN — SODIUM CHLORIDE, PRESERVATIVE FREE 10 ML: 5 INJECTION INTRAVENOUS at 08:29

## 2023-01-01 RX ADMIN — POLYETHYLENE GLYCOL 3350 17 G: 17 POWDER, FOR SOLUTION ORAL at 08:29

## 2023-01-01 RX ADMIN — GABAPENTIN 600 MG: 300 CAPSULE ORAL at 15:36

## 2023-01-01 RX ADMIN — ATORVASTATIN CALCIUM 40 MG: 40 TABLET, FILM COATED ORAL at 08:30

## 2023-01-01 RX ADMIN — SODIUM CHLORIDE: 9 INJECTION, SOLUTION INTRAVENOUS at 12:35

## 2023-01-01 RX ADMIN — SODIUM CHLORIDE, PRESERVATIVE FREE 10 ML: 5 INJECTION INTRAVENOUS at 21:19

## 2023-01-01 RX ADMIN — TRAMADOL HYDROCHLORIDE 100 MG: 50 TABLET, COATED ORAL at 09:31

## 2023-01-01 RX ADMIN — TRAMADOL HYDROCHLORIDE 100 MG: 50 TABLET, COATED ORAL at 18:45

## 2023-01-01 RX ADMIN — GABAPENTIN 600 MG: 300 CAPSULE ORAL at 21:18

## 2023-01-01 ASSESSMENT — PAIN DESCRIPTION - LOCATION
LOCATION: BACK
LOCATION: SHOULDER;KNEE

## 2023-01-01 ASSESSMENT — PAIN SCALES - GENERAL
PAINLEVEL_OUTOF10: 0
PAINLEVEL_OUTOF10: 7
PAINLEVEL_OUTOF10: 2
PAINLEVEL_OUTOF10: 7

## 2023-01-01 ASSESSMENT — PAIN - FUNCTIONAL ASSESSMENT: PAIN_FUNCTIONAL_ASSESSMENT: ACTIVITIES ARE NOT PREVENTED

## 2023-01-01 ASSESSMENT — PAIN DESCRIPTION - DESCRIPTORS: DESCRIPTORS: ACHING;CRAMPING

## 2023-01-01 ASSESSMENT — PAIN DESCRIPTION - PAIN TYPE: TYPE: ACUTE PAIN;CHRONIC PAIN

## 2023-01-01 ASSESSMENT — PAIN DESCRIPTION - ORIENTATION: ORIENTATION: RIGHT

## 2023-01-01 NOTE — PROGRESS NOTES
[facility]    1/1/2023 2:20 PM        Name: Carol Ann Rose . Admitted: 12/30/2022  Primary Care Provider: No primary care provider on file. (Tel: None)      Subjective:  . Patient feeling ok. No chest pain. Dysuria improved.     Reviewed interval ancillary notes    Current Medications  cefTRIAXone (ROCEPHIN) 1,000 mg in dextrose 5 % 50 mL IVPB mini-bag, Q24H  polyethylene glycol (GLYCOLAX) packet 17 g, Daily  apixaban (ELIQUIS) tablet 5 mg, BID  atorvastatin (LIPITOR) tablet 40 mg, Daily  furosemide (LASIX) tablet 60 mg, BID  gabapentin (NEURONTIN) capsule 600 mg, TID  insulin glargine (LANTUS) injection vial 15 Units, Nightly  levothyroxine (SYNTHROID) tablet 75 mcg, Daily  lisinopril (PRINIVIL;ZESTRIL) tablet 10 mg, Daily  traMADol (ULTRAM) tablet 100 mg, Q8H PRN  sodium chloride flush 0.9 % injection 5-40 mL, 2 times per day  sodium chloride flush 0.9 % injection 5-40 mL, PRN  0.9 % sodium chloride infusion, PRN  ondansetron (ZOFRAN-ODT) disintegrating tablet 4 mg, Q8H PRN   Or  ondansetron (ZOFRAN) injection 4 mg, Q6H PRN  acetaminophen (TYLENOL) tablet 650 mg, Q6H PRN   Or  acetaminophen (TYLENOL) suppository 650 mg, Q6H PRN  dextrose bolus 10% 125 mL, PRN   Or  dextrose bolus 10% 250 mL, PRN  glucagon (rDNA) injection 1 mg, PRN  dextrose 10 % infusion, Continuous PRN  insulin lispro (HUMALOG) injection vial 0-8 Units, TID WC  insulin lispro (HUMALOG) injection vial 0-4 Units, Nightly  Triple Antibiotic First Aid [phone] OINT 1 application, BID      Objective:  /69   Pulse 86   Temp 97.6 °F (36.4 °C) (Oral)   Resp 16   Ht 5' 11.25\" (1.81 m)   Wt (!) 341 lb 12.8 oz (155 kg)   SpO2 94%   BMI 47.34 kg/m²     Intake/Output Summary (Last 24 hours) at 1/1/2023 1420  Last data filed at 1/1/2023 1249  Gross per 24 hour   Intake 240 ml   Output 2745 ml   Net -2505 ml        Wt Readings from Last 3 Encounters:   01/01/23 (!) 341 lb 12.8 oz (155 kg)       General appearance:  Appears comfortable  Eyes: Sclera clear. Pupils equal.  ENT: Moist oral mucosa. Trachea midline, no adenopathy. Cardiovascular: Regular rhythm, normal S1, S2. No murmur. No edema in lower extremities  Respiratory: Not using accessory muscles. Good inspiratory effort. Clear to auscultation bilaterally, no wheeze or crackles. GI: Abdomen soft, no tenderness, not distended, normal bowel sounds  Musculoskeletal: No cyanosis in digits, neck supple  Neurology: CN 2-12 grossly intact. No speech or motor deficits  Psych: Normal affect. Alert and oriented in time, place and person  Skin: Warm, dry, normal turgor    Labs and Tests:  CBC:   Recent Labs     12/30/22  0030 12/31/22  1034 01/01/23  1158   WBC 8.4 7.7 8.2   HGB 14.8 14.9 15.1    245 264       BMP:    Recent Labs     12/30/22  0030 12/31/22  1034 01/01/23  1158   * 135* 139   K 3.5 4.4 3.9   CL 96* 98* 101   CO2 25 28 30   BUN 19 16 17   CREATININE 1.1 0.9 0.9   GLUCOSE 94 131* 96       Hepatic:   Recent Labs     12/30/22  0030   AST 19   ALT 18   BILITOT 0.9   ALKPHOS 82           Problem List  Active Problems:    Chest pain, rule out acute myocardial infarction  Resolved Problems:    * No resolved hospital problems. *       Assessment & Plan:   Chest pain-resolved. Stress test normal  UTI-continue rocephin-day 2. Prelim cultures positive for proteus. AXR and CT reviewed-no stones. Chronic respiratory failure-on baseline requirement  Disposition-dc tomorrow if sensitivities back. Diet: ADULT DIET;  Regular; 4 carb choices (60 gm/meal)  Code:Full Code  DVT PPX: dari Kaufman PA-C   1/1/2023 2:20 PM

## 2023-01-02 VITALS
HEART RATE: 70 BPM | SYSTOLIC BLOOD PRESSURE: 112 MMHG | HEIGHT: 71 IN | RESPIRATION RATE: 18 BRPM | WEIGHT: 315 LBS | OXYGEN SATURATION: 95 % | BODY MASS INDEX: 44.1 KG/M2 | TEMPERATURE: 96.5 F | DIASTOLIC BLOOD PRESSURE: 63 MMHG

## 2023-01-02 LAB
GLUCOSE BLD-MCNC: 83 MG/DL (ref 70–99)
ORGANISM: ABNORMAL
PERFORMED ON: NORMAL
URINE CULTURE, ROUTINE: ABNORMAL

## 2023-01-02 PROCEDURE — 6370000000 HC RX 637 (ALT 250 FOR IP): Performed by: INTERNAL MEDICINE

## 2023-01-02 RX ORDER — CEFDINIR 300 MG/1
300 CAPSULE ORAL 2 TIMES DAILY
Qty: 10 CAPSULE | Refills: 0
Start: 2023-01-02 | End: 2023-01-07

## 2023-01-02 RX ADMIN — ATORVASTATIN CALCIUM 40 MG: 40 TABLET, FILM COATED ORAL at 09:23

## 2023-01-02 RX ADMIN — GABAPENTIN 600 MG: 300 CAPSULE ORAL at 09:22

## 2023-01-02 RX ADMIN — APIXABAN 5 MG: 5 TABLET, FILM COATED ORAL at 09:23

## 2023-01-02 RX ADMIN — LISINOPRIL 10 MG: 10 TABLET ORAL at 09:23

## 2023-01-02 RX ADMIN — FUROSEMIDE 60 MG: 40 TABLET ORAL at 09:23

## 2023-01-02 RX ADMIN — LEVOTHYROXINE SODIUM 75 MCG: 0.07 TABLET ORAL at 05:42

## 2023-01-02 RX ADMIN — TRAMADOL HYDROCHLORIDE 100 MG: 50 TABLET, COATED ORAL at 09:23

## 2023-01-02 ASSESSMENT — PAIN SCALES - GENERAL
PAINLEVEL_OUTOF10: 7
PAINLEVEL_OUTOF10: 0
PAINLEVEL_OUTOF10: 0

## 2023-01-02 NOTE — PLAN OF CARE

## 2023-01-02 NOTE — PLAN OF CARE
Problem: Discharge Planning  Goal: Discharge to home or other facility with appropriate resources  1/1/2023 2349 by Shanice Ruiz RN  Outcome: Progressing  Flowsheets (Taken 1/1/2023 2112)  Discharge to home or other facility with appropriate resources:   Identify barriers to discharge with patient and caregiver   Arrange for needed discharge resources and transportation as appropriate  1/1/2023 1945 by Jailyn Flores RN  Outcome: Progressing     Problem: Pain  Goal: Verbalizes/displays adequate comfort level or baseline comfort level  1/1/2023 2349 by Shanice Ruiz RN  Outcome: Progressing  1/1/2023 1945 by Jailyn Flores RN  Outcome: Progressing     Problem: Safety - Adult  Goal: Free from fall injury  1/1/2023 2349 by Shanice Ruiz RN  Outcome: Progressing  1/1/2023 1945 by Jailyn Flores RN  Outcome: Progressing     Problem: Chronic Conditions and Co-morbidities  Goal: Patient's chronic conditions and co-morbidity symptoms are monitored and maintained or improved  Recent Flowsheet Documentation  Taken 1/1/2023 2112 by Yenny Gutierrez 34 - Patient's Chronic Conditions and Co-Morbidity Symptoms are Monitored and Maintained or Improved: Monitor and assess patient's chronic conditions and comorbid symptoms for stability, deterioration, or improvement  1/1/2023 1945 by Jailyn Flores RN  Outcome: Progressing     Problem: Skin/Tissue Integrity  Goal: Absence of new skin breakdown  Description: 1. Monitor for areas of redness and/or skin breakdown  2. Assess vascular access sites hourly  3. Every 4-6 hours minimum:  Change oxygen saturation probe site  4. Every 4-6 hours:  If on nasal continuous positive airway pressure, respiratory therapy assess nares and determine need for appliance change or resting period.   1/1/2023 1945 by Jailyn Flores RN  Outcome: Progressing     Problem: ABCDS Injury Assessment  Goal: Absence of physical injury  1/1/2023 1945 by Jailyn Flores RN  Outcome: Progressing

## 2023-01-02 NOTE — CARE COORDINATION
CM had left vm for both oCle Russo and Yas Franklin in admissions this am informing of pt's return. No call back. CM called main number and spoke to [address] who states when RN calls to ask for mckeon 1 or 200 RN and provided fax number below instead of number CM has on file for paperwork to be faxed to. Discharge Plan:     Patient discharged to:[place]   SW/DC Planner faxed, CHING and AVS UW:[phone] per Facility RN request   Narcotic Prescriptions faxed were:n/a   RN: Leopoldo Like will call report to:   [phone]- [phone] [facility] 1  RN   Medical Transport with: [facility] [id number]   time:11:15-11:30  Family advised of discharge?:pt will notify   HENS Submitted?:  n/a LTC  All discharge needs met per case management.     Luis Panchal, RN, BSN  [phone]

## 2023-01-02 NOTE — DISCHARGE INSTR - COC
Continuity of Care Form    Patient Name: Erasmo Levy   :  1955  MRN:  [id number]    Admit date:  2022  Discharge date:  2023    Code Status Order: Full Code   Advance Directives:     Admitting Physician:  Isrrael Ozuna MD  PCP: No primary care provider on file. Discharging Nurse: [facility] Unit/Room#: 3AN-3302/3302-01  Discharging Unit Phone Number: [phone]    Emergency Contact:   Extended Emergency Contact Information  Primary Emergency Contact: Tyler Ray  Mobile Phone: [phone]  Relation: Brother/Sister    Past Surgical History:  History reviewed. No pertinent surgical history. Immunization History: There is no immunization history on file for this patient. Active Problems:  Patient Active Problem List   Diagnosis Code    Chest pain, rule out acute myocardial infarction R07.9    Chest pain R07.9       Isolation/Infection:   Isolation            Contact  C Diff Contact          Patient Infection Status       Infection Onset Added Last Indicated Last Indicated By Review Planned Expiration Resolved Resolved By    C-diff Rule Out 22 GI Bacterial Pathogens By PCR (Ordered) 23      MRSA 20 Vasquez Del Rosario RN        Added from external infection.             Nurse Assessment:  Last Vital Signs: /63   Pulse 70   Temp (!) 96.5 °F (35.8 °C) (Axillary)   Resp 18   Ht 5' 11.25\" (1.81 m)   Wt (!) 346 lb (156.9 kg)   SpO2 95%   BMI 47.92 kg/m²     Last documented pain score (0-10 scale): Pain Level: 7  Last Weight:   Wt Readings from Last 1 Encounters:   23 (!) 346 lb (156.9 kg)     Mental Status:  oriented and alert    IV Access:  - None    Nursing Mobility/ADLs:  Walking   Independent  Transfer  Independent  [facility]  Med Delivery   whole    Wound Care Documentation and Therapy:        Elimination:  Continence: Bowel: Yes  Bladder: Yes  Urinary Catheter: None   Colostomy/Ileostomy/Ileal Conduit: No       Date of Last BM: 1/2/2023    Intake/Output Summary (Last 24 hours) at 1/2/2023 0917  Last data filed at 1/2/2023 0430  Gross per 24 hour   Intake 960 ml   Output 1900 ml   Net -940 ml     I/O last 3 completed shifts: In: 12 [P.O.:960]  Out: 4445 [Urine:4445]    Safety Concerns: At Risk for Falls    Impairments/Disabilities:      Left arm weakness    Nutrition Therapy:  Current Nutrition Therapy:   - Oral Diet:  Low Sodium (3-4gm)    Routes of Feeding: Oral  Liquids: Thin Liquids  Daily Fluid Restriction: no  Last Modified Barium Swallow with Video (Video Swallowing Test): not done    Treatments at the Time of Hospital Discharge:   Respiratory Treatments:   Oxygen Therapy:  is on oxygen at 3 L/min per nasal cannula. Ventilator:    - No ventilator support    Rehab Therapies: Physical Therapy and Occupational Therapy  Weight Bearing Status/Restrictions: No weight bearing restrictions  Other Medical Equipment (for information only, NOT a DME order): Other Treatments:     Patient's personal belongings (please select all that are sent with patient):  Elan    RN SIGNATURE:  Electronically signed by Curtis Lopez RN on 1/2/23 at 11:07 AM EST    CASE MANAGEMENT/SOCIAL WORK SECTION    Inpatient Status Date: 1/1/2023    Readmission Risk Assessment Score:  Readmission Risk              Risk of Unplanned Readmission:  12           Discharging to Facility/ Jessica Samuels, [facility] [address]  Phone: [phone]  Fax:  [phone]                Dialysis Facility (if applicable)   Name:  Address:  Dialysis Schedule:  Phone:  Fax:    / signature: Lida Ratliff RN, BSN  [phone]     PHYSICIAN SECTION    Prognosis: Good    Condition at Discharge: Stable    Rehab Potential (if transferring to Rehab): Good    Recommended Labs or Other Treatments After Discharge:     Physician Certification: I certify the above information and transfer of Ely Snow  is necessary for the continuing treatment of the diagnosis listed and that he requires Mauro Tracy for greater 30 days.      Update Admission H&P: No change in H&P    PHYSICIAN SIGNATURE:  Electronically signed by Braxton Nguyen PA-C on 1/2/23 at 9:17 AM EST

## 2023-01-02 NOTE — PROGRESS NOTES
Data- discharge order received, pt verbalized agreement to discharge, disposition to [facility] #[phone], CHING reviewed and signed by physician. Action- AVS prepared, CHING completed/ reported faxed by case management/. Discharge instruction summary: Diet- low sodium, Activity- as lilia, immunizations reviewed , medications prescriptions to be filled at receiving facility except for the controlled prescriptions to be sent: n/a, Transfer code status: Full Code, LDAs to remain with discharge: n/a. DME used: n/a. Response- Bedside RN to call report to receiving facility. Pt belongings gathered, peripheral IV and cardiac monitoring removed. Disposition to Discharged via ambulance to skilled nursing by EMS transportation, no complications reported. 1. WEIGHT: Admit Weight: (!) 331 lb 4 oz (150.3 kg) (12/30/22 0415)        Today  Weight: (!) 346 lb (156.9 kg) (01/02/23 0430)       2.  O2 SAT.: SpO2: 95 % (01/02/23 0800)

## 2023-01-06 NOTE — DISCHARGE SUMMARY
[facility]    Patient Demographics    Brown Rosenbaum  Date of Birth. 1955  MRN. [id number]     Primary care provider. No primary care provider on file. (Tel: None)    Admit date: 12/30/2022    Discharge date (blank if same as Note Date): 1/2/2023  Note Date: 1/6/2023     Reason for Hospitalization. Chief Complaint   Patient presents with    Chest Pain     C/O chest pain r/t untreated generalized pain. Pt states he began panicking because he was unable to get his Tramadol at [facility]. Significant Findings. Principal Problem:    Chest pain  Active Problems:    Chest pain, rule out acute myocardial infarction  Resolved Problems:    * No resolved hospital problems. *       Problems and results from this hospitalization that need follow up. None     Significant test results and incidental findings.     Organism Proteus mirabilis Abnormal     Urine Culture, Routine >100,000 CFU/ml    Resulting Agency [address] [facility]        Susceptibility    Proteus mirabilis (1)    Antibiotic Interpretation Microscan  Method Status    ampicillin Sensitive <=8 mcg/mL BACTERIAL SUSCEPTIBILITY PANEL BY CHESTER     ampicillin-sulbactam Sensitive <=8/4 mcg/mL BACTERIAL SUSCEPTIBILITY PANEL BY CHESTER     ceFAZolin Sensitive <=2 mcg/mL BACTERIAL SUSCEPTIBILITY PANEL BY CHESTER     cefepime Sensitive <=2 mcg/mL BACTERIAL SUSCEPTIBILITY PANEL BY CHESTER     cefTRIAXone Sensitive <=1 mcg/mL BACTERIAL SUSCEPTIBILITY PANEL BY CHESTER     cefuroxime Sensitive <=4 mcg/mL BACTERIAL SUSCEPTIBILITY PANEL BY CHESTER     ciprofloxacin Resistant >2 mcg/mL BACTERIAL SUSCEPTIBILITY PANEL BY CHESTER     ertapenem Sensitive <=0.5 mcg/mL BACTERIAL SUSCEPTIBILITY PANEL BY CHSETER     gentamicin Sensitive <=4 mcg/mL BACTERIAL SUSCEPTIBILITY PANEL BY CHESTER     meropenem Sensitive <=1 mcg/mL BACTERIAL SUSCEPTIBILITY PANEL BY CHESTER nitrofurantoin Resistant >64 mcg/mL BACTERIAL SUSCEPTIBILITY PANEL BY CHESTER     piperacillin-tazobactam Sensitive <=16 mcg/mL BACTERIAL SUSCEPTIBILITY PANEL BY CHESTER     trimethoprim-sulfamethoxazole Sensitive <=2/38 mcg/mL BACTERIAL SUSCEPTIBILITY PANEL BY HCESTER                CT abd/pelvis  No acute abnormality identified. Specifically, no renal or ureteral calculi. The calcifications seen on the radiograph are compatible with phleboliths as   well as small calcifications within the prostate. GXT   Summary    There is a fixed inferior defect with no associated wall motion abnormality    consistent with diaphragmatic attenuation artifact. No evidence of myocardium at risk for significant reversible ischemia. Normal myocardial perfusion study. Normal LV size and systolic function. Invasive procedures and treatments. None     [facility] Course. Patient is a 59-year-old male who presented to hospital with chest pain after getting upset at the nursing home for his nurse not giving him his tramadol. Initial cardiac work-up was unremarkable. Patient was admitted and underwent a stress test which was normal.  Following stress test he was complaining of dysuria. UA was suggestive of UTI. He was started on IV antibiotics. He did undergo an abdominal x-ray and CT as there were crystals in the urine as well. These are both negative for stones. He was kept until cultures returned. They were positive for Proteus. He was transitioned to oral antibiotics and discharged back to the nursing home in stable condition. Consults. IP CONSULT TO CARDIOLOGY    Physical examination on discharge day. /63   Pulse 70   Temp (!) 96.5 °F (35.8 °C) (Axillary)   Resp 18   Ht 5' 11.25\" (1.81 m)   Wt (!) 346 lb (156.9 kg)   SpO2 95%   BMI 47.92 kg/m²   General appearance. Alert. Looks comfortable. HEENT. Sclera clear. Moist mucus membranes. Cardiovascular.  Regular rate and rhythm, normal S1, S2. No murmur. Respiratory. Not using accessory muscles. Clear to auscultation bilaterally, no wheeze. Gastrointestinal. Abdomen obese, soft, non-tender, not distended, normal bowel sounds  Neurology. Facial symmetry. No speech deficits. Extremities. No edema in lower extremities. Skin. Warm, dry, normal turgor    Condition at time of discharge stable    Medication instructions provided to patient at discharge. Medication List        START taking these medications      cefdinir 300 MG capsule  Commonly known as: OMNICEF  Take 1 capsule by mouth 2 times daily for 5 days  Notes to patient: Antibiotic  Use: treats infections caused by bacteria   Side effects: may cause diarrhea or stomach upset            CONTINUE taking these medications      * albuterol sulfate 108 (90 Base) MCG/ACT aerosol powder inhalation  Commonly known as: PROAIR RESPICLICK  Notes to patient: Albuterol/pratropium (DuoNeb) or Albuterol (Proventil)  Use: to open airways, reduce secretions  Side effects: nervousness, jitteriness, shakiness, headache, fast heartbeat     * albuterol (2.5 MG/3ML) 0.083% nebulizer solution  Commonly known as: PROVENTIL  Notes to patient: Albuterol/pratropium (DuoNeb) or Albuterol (Proventil)  Use: to open airways, reduce secretions  Side effects: nervousness, jitteriness, shakiness, headache, fast heartbeat     apixaban 5 MG Tabs tablet  Commonly known as: ELIQUIS  Notes to patient: Use: to prevent blood clots and stroke   Side Effects: bleeding, bruising     atorvastatin 40 MG tablet  Commonly known as: LIPITOR  Notes to patient:    Atorvastatin (Lipitor®)  Use: lower bad cholesterol   Side effects: headache, muscle pains, constipation, diarrhea. furosemide 40 MG tablet  Commonly known as: LASIX  Notes to patient:  For fluid management    Side effects: low potassium, increased urination, increased dry mouth/thirst     gabapentin 600 MG tablet  Commonly known as: NEURONTIN  Notes to patient: Gabapentin (Neurontin)  Use: to treat nerve pain, seizures  Side effects: nausea, unsteady gait     glipiZIDE 5 MG tablet  Commonly known as: GLUCOTROL  Notes to patient: Glipizide (Glucotrol*)  Use: treat diabetes or high blood sugar  Side effects: low blood sugar, shakiness, dizziness, headache, or any stomach distress     insulin glargine 100 UNIT/ML injection vial  Commonly known as: LANTUS  Notes to patient: Long acting insulin; keeps blood sugar from raising really high throughout the day  Side effects: low blood sugar     Jardiance 25 MG tablet  Generic drug: empagliflozin  Notes to patient: Use: manage diabetes (lower BS)  Side effects: urinary tract infections, low BS, dizziness, nausea     latanoprost 0.005 % ophthalmic solution  Commonly known as: XALATAN  Notes to patient: [place]  Use: Decrease elevated intraocular pressure  Side Effects: Blurred vision, eyelash changes     levothyroxine 75 MCG tablet  Commonly known as: SYNTHROID  Notes to patient: Thyroid product  Use: Treat hypothyroidism  Side Effects: Hair loss, chest pain, irregular heart beat, irritability, insomnia     lisinopril 10 MG tablet  Commonly known as: PRINIVIL;ZESTRIL  Notes to patient: Lisinopril is used to treat high blood pressure, help heart function after a heart attack, help a weak heart. Side effects: dizziness, headache, cough. loperamide 1 MG/7.5ML Liqd solution  Commonly known as: IMODIUM  Notes to patient: Loperamide  Use: To treat diarrhea. Side Effects: dizziness; drowsiness, tired feeling; constipation; mild stomach pain; or mild skin rash or itching.      Melatonin 5 MG Caps  Notes to patient: Use: sleep  Side effects: depression & high blood sugar     metFORMIN 500 MG tablet  Commonly known as: GLUCOPHAGE  Notes to patient: Metformin (Glucophage*)  Use: treats diabetes or high blood sugar  Side effects: upset stomach, low blood sugar     methocarbamol 750 MG tablet  Commonly known as: ROBAXIN  Notes to patient: Use: for muscle spasms  Side effects: dizziness, drowsiness, nausea     metOLazone 2.5 MG tablet  Commonly known as: ZAROXOLYN  Notes to patient: Use: for treatment of high blood pressure and/or congestive heart failure  Side effects: notify physician if you experience blood in the urine or stool, black or tarry stools, chest pain, bloating     nystatin Powd powder  Commonly known as: MYCOSTATIN  Notes to patient: Nystatin (Bio-Statin*)  Use: Treatment of fungal infection  Side effects: Diarrhea, nausea/vomiting, headache     ondansetron 4 MG tablet  Commonly known as: Rosalea Mass  Notes to patient: Ondansetron/ Zofran  Use: nausea and vomiting  Side effects: headache, weakness or dizziness     therapeutic multivitamin-minerals tablet  Notes to patient: Use: multivitamin  Side effects: nausea     traMADol HCl 100 MG Tabs  Notes to patient: Use: for pain relief  Side effects: abdominal fullness, bloating, dizziness     Trelegy Ellipta 100-62.5-25 MCG/ACT Aepb inhaler  Generic drug: fluticasone-umeclidin-vilant     Trulicity 3 WY/1.1DP Sopn  Generic drug: Dulaglutide     Vistaril 50 MG capsule  Generic drug: hydrOXYzine pamoate  Notes to patient: For nerve pain  Side effects: drowsiness, central nervous system depression            * This list has 2 medication(s) that are the same as other medications prescribed for you. Read the directions carefully, and ask your doctor or other care provider to review them with you. Where to Get Your Medications        Information about where to get these medications is not yet available    Ask your nurse or doctor about these medications  cefdinir 300 MG capsule         Discharge recommendations given to patient. Follow Up. PCP in 1 week   Disposition. SNF  Activity. activity as tolerated  Diet: No diet orders on file      Spent 35 minutes in discharge process. Signed:   Bhavesh Cheek PA-C     1/6/2023 12:32 PM

## 2023-09-14 ENCOUNTER — NURSE TRIAGE (OUTPATIENT)
Dept: OTHER | Facility: CLINIC | Age: 68
End: 2023-09-14

## 2023-09-14 NOTE — TELEPHONE ENCOUNTER
Location of patient: [place]    Received call from Ita Muniz at [facility]; Patient with Red Flag Complaint requesting to establish care with [facility]. Subjective: Caller states \"out of medication so legs are swelling\"     Current Symptoms: Both legs are swelling. Has CHF and is supposed to be on water pills all the time. Has been without meds for 2 months. Has been having insurance issues. When he is on the medication the swelling will go down. Swelling not as bad as they have been. History of stroke so already hard to walk, left side is bad from the stroke. Swelling is from feet up to knees. Left side goes a little above knee. Left leg has more red than right. Will have pain but not only in leg. Can feel the fluid starting to build up in chest  Face looks puffy. Onset: a few months ago; gradual    Associated Symptoms: NA    Pain Severity:     Temperature: denies     What has been tried: elevates legs    LMP: NA Pregnant: NA    Recommended disposition: Go to [facility]    Care advice provided, patient verbalizes understanding; denies any other questions or concerns; instructed to call back for any new or worsening symptoms. Patient/Caller agrees with recommended disposition; writer provided warm transfer to Nancy Daly at [facility] for appointment scheduling    Attention Provider: Thank you for allowing me to participate in the care of your patient. The patient was connected to triage in response to information provided to the [facility]. Please do not respond through this encounter as the response is not directed to a shared pool.     Reason for Disposition   Swelling of face, arm or hands  (Exception: Slight puffiness of fingers during hot weather.)    Protocols used: Leg Swelling and Edema-ADULT-[place]

## 2023-09-21 ENCOUNTER — HOSPITAL ENCOUNTER (INPATIENT)
Age: 68
LOS: 7 days | Discharge: HOME | End: 2023-09-28
Attending: INTERNAL MEDICINE | Admitting: INTERNAL MEDICINE
Payer: MEDICARE

## 2023-09-21 ENCOUNTER — APPOINTMENT (OUTPATIENT)
Dept: GENERAL RADIOLOGY | Age: 68
End: 2023-09-21
Payer: MEDICARE

## 2023-09-21 DIAGNOSIS — N39.0 URINARY TRACT INFECTION WITHOUT HEMATURIA, SITE UNSPECIFIED: ICD-10-CM

## 2023-09-21 DIAGNOSIS — L03.115 CELLULITIS OF RIGHT LOWER EXTREMITY: Primary | ICD-10-CM

## 2023-09-21 PROBLEM — L03.90 CELLULITIS: Status: ACTIVE | Noted: 2023-09-21

## 2023-09-21 LAB
ALBUMIN SERPL-MCNC: 4.1 G/DL (ref 3.4–5)
ALBUMIN/GLOB SERPL: 1.2 {RATIO} (ref 1.1–2.2)
ALP SERPL-CCNC: 113 U/L (ref 40–129)
ALT SERPL-CCNC: 14 U/L (ref 10–40)
ANION GAP SERPL CALCULATED.3IONS-SCNC: 11 MMOL/L (ref 3–16)
AST SERPL-CCNC: 15 U/L (ref 15–37)
BACTERIA URNS QL MICRO: ABNORMAL /HPF
BASOPHILS # BLD: 0 K/UL (ref 0–0.2)
BASOPHILS NFR BLD: 0.1 %
BILIRUB SERPL-MCNC: 1.7 MG/DL (ref 0–1)
BILIRUB UR QL STRIP.AUTO: NEGATIVE
BUN SERPL-MCNC: 12 MG/DL (ref 7–20)
CALCIUM OXALATE CRYSTALS: PRESENT
CALCIUM SERPL-MCNC: 9.5 MG/DL (ref 8.3–10.6)
CHLORIDE SERPL-SCNC: 102 MMOL/L (ref 99–110)
CLARITY UR: CLEAR
CO2 SERPL-SCNC: 24 MMOL/L (ref 21–32)
COLOR UR: YELLOW
CREAT SERPL-MCNC: 0.9 MG/DL (ref 0.8–1.3)
DEPRECATED RDW RBC AUTO: 16.1 % (ref 12.4–15.4)
EOSINOPHIL # BLD: 0 K/UL (ref 0–0.6)
EOSINOPHIL NFR BLD: 0.1 %
EPI CELLS #/AREA URNS AUTO: 1 /HPF (ref 0–5)
GFR SERPLBLD CREATININE-BSD FMLA CKD-EPI: >60 ML/MIN/{1.73_M2}
GLUCOSE BLD-MCNC: 103 MG/DL (ref 70–99)
GLUCOSE BLD-MCNC: 67 MG/DL (ref 70–99)
GLUCOSE SERPL-MCNC: 110 MG/DL (ref 70–99)
GLUCOSE UR STRIP.AUTO-MCNC: >=1000 MG/DL
HCT VFR BLD AUTO: 53.7 % (ref 40.5–52.5)
HGB BLD-MCNC: 18.2 G/DL (ref 13.5–17.5)
HGB UR QL STRIP.AUTO: NEGATIVE
HYALINE CASTS #/AREA URNS AUTO: 0 /LPF (ref 0–8)
KETONES UR STRIP.AUTO-MCNC: ABNORMAL MG/DL
LACTATE BLDV-SCNC: 1.9 MMOL/L (ref 0.4–2)
LEUKOCYTE ESTERASE UR QL STRIP.AUTO: ABNORMAL
LYMPHOCYTES # BLD: 0.3 K/UL (ref 1–5.1)
LYMPHOCYTES NFR BLD: 2.1 %
MCH RBC QN AUTO: 29.2 PG (ref 26–34)
MCHC RBC AUTO-ENTMCNC: 33.9 G/DL (ref 31–36)
MCV RBC AUTO: 86.1 FL (ref 80–100)
MONOCYTES # BLD: 0.7 K/UL (ref 0–1.3)
MONOCYTES NFR BLD: 4.4 %
NEUTROPHILS # BLD: 15.9 K/UL (ref 1.7–7.7)
NEUTROPHILS NFR BLD: 93.3 %
NITRITE UR QL STRIP.AUTO: NEGATIVE
NT-PROBNP SERPL-MCNC: 125 PG/ML (ref 0–124)
PERFORMED ON: ABNORMAL
PERFORMED ON: ABNORMAL
PH UR STRIP.AUTO: 6 [PH] (ref 5–8)
PLATELET # BLD AUTO: 199 K/UL (ref 135–450)
PMV BLD AUTO: 8.3 FL (ref 5–10.5)
POTASSIUM SERPL-SCNC: 3.9 MMOL/L (ref 3.5–5.1)
PROT SERPL-MCNC: 7.5 G/DL (ref 6.4–8.2)
PROT UR STRIP.AUTO-MCNC: NEGATIVE MG/DL
RBC # BLD AUTO: 6.23 M/UL (ref 4.2–5.9)
RBC CLUMPS #/AREA URNS AUTO: 2 /HPF (ref 0–4)
SODIUM SERPL-SCNC: 137 MMOL/L (ref 136–145)
SP GR UR STRIP.AUTO: 1.03 (ref 1–1.03)
UA COMPLETE W REFLEX CULTURE PNL UR: YES
UA DIPSTICK W REFLEX MICRO PNL UR: YES
URN SPEC COLLECT METH UR: ABNORMAL
UROBILINOGEN UR STRIP-ACNC: 1 E.U./DL
WBC # BLD AUTO: 17 K/UL (ref 4–11)
WBC #/AREA URNS AUTO: 57 /HPF (ref 0–5)

## 2023-09-21 PROCEDURE — 1200000000 HC SEMI PRIVATE

## 2023-09-21 PROCEDURE — 2580000003 HC RX 258: Performed by: INTERNAL MEDICINE

## 2023-09-21 PROCEDURE — 6370000000 HC RX 637 (ALT 250 FOR IP): Performed by: INTERNAL MEDICINE

## 2023-09-21 PROCEDURE — 87641 MR-STAPH DNA AMP PROBE: CPT

## 2023-09-21 PROCEDURE — 97162 PT EVAL MOD COMPLEX 30 MIN: CPT

## 2023-09-21 PROCEDURE — 99222 1ST HOSP IP/OBS MODERATE 55: CPT | Performed by: INTERNAL MEDICINE

## 2023-09-21 PROCEDURE — 96374 THER/PROPH/DIAG INJ IV PUSH: CPT

## 2023-09-21 PROCEDURE — 2580000003 HC RX 258: Performed by: PHYSICIAN ASSISTANT

## 2023-09-21 PROCEDURE — 80053 COMPREHEN METABOLIC PANEL: CPT

## 2023-09-21 PROCEDURE — 83880 ASSAY OF NATRIURETIC PEPTIDE: CPT

## 2023-09-21 PROCEDURE — 87086 URINE CULTURE/COLONY COUNT: CPT

## 2023-09-21 PROCEDURE — 94640 AIRWAY INHALATION TREATMENT: CPT

## 2023-09-21 PROCEDURE — 83605 ASSAY OF LACTIC ACID: CPT

## 2023-09-21 PROCEDURE — 97166 OT EVAL MOD COMPLEX 45 MIN: CPT

## 2023-09-21 PROCEDURE — 97530 THERAPEUTIC ACTIVITIES: CPT

## 2023-09-21 PROCEDURE — 93971 EXTREMITY STUDY: CPT

## 2023-09-21 PROCEDURE — 96375 TX/PRO/DX INJ NEW DRUG ADDON: CPT

## 2023-09-21 PROCEDURE — 6360000002 HC RX W HCPCS: Performed by: PHYSICIAN ASSISTANT

## 2023-09-21 PROCEDURE — 87040 BLOOD CULTURE FOR BACTERIA: CPT

## 2023-09-21 PROCEDURE — 81001 URINALYSIS AUTO W/SCOPE: CPT

## 2023-09-21 PROCEDURE — 73590 X-RAY EXAM OF LOWER LEG: CPT

## 2023-09-21 PROCEDURE — 99285 EMERGENCY DEPT VISIT HI MDM: CPT

## 2023-09-21 PROCEDURE — 36415 COLL VENOUS BLD VENIPUNCTURE: CPT

## 2023-09-21 PROCEDURE — 85025 COMPLETE CBC W/AUTO DIFF WBC: CPT

## 2023-09-21 RX ORDER — LEVOTHYROXINE SODIUM 0.05 MG/1
50 TABLET ORAL DAILY
COMMUNITY

## 2023-09-21 RX ORDER — LANOLIN ALCOHOL/MO/W.PET/CERES
5 CREAM (GRAM) TOPICAL NIGHTLY PRN
Status: DISCONTINUED | OUTPATIENT
Start: 2023-09-21 | End: 2023-09-28 | Stop reason: HOSPADM

## 2023-09-21 RX ORDER — GABAPENTIN 300 MG/1
600 CAPSULE ORAL 2 TIMES DAILY
Status: DISCONTINUED | OUTPATIENT
Start: 2023-09-21 | End: 2023-09-28 | Stop reason: HOSPADM

## 2023-09-21 RX ORDER — ALBUTEROL SULFATE 90 UG/1
2 AEROSOL, METERED RESPIRATORY (INHALATION) EVERY 4 HOURS PRN
Status: DISCONTINUED | OUTPATIENT
Start: 2023-09-21 | End: 2023-09-28 | Stop reason: HOSPADM

## 2023-09-21 RX ORDER — SODIUM CHLORIDE 0.9 % (FLUSH) 0.9 %
5-40 SYRINGE (ML) INJECTION EVERY 12 HOURS SCHEDULED
Status: DISCONTINUED | OUTPATIENT
Start: 2023-09-21 | End: 2023-09-28 | Stop reason: HOSPADM

## 2023-09-21 RX ORDER — ATORVASTATIN CALCIUM 40 MG/1
40 TABLET, FILM COATED ORAL NIGHTLY
Status: DISCONTINUED | OUTPATIENT
Start: 2023-09-21 | End: 2023-09-28 | Stop reason: HOSPADM

## 2023-09-21 RX ORDER — POLYETHYLENE GLYCOL 3350 17 G/17G
17 POWDER, FOR SOLUTION ORAL DAILY PRN
Status: DISCONTINUED | OUTPATIENT
Start: 2023-09-21 | End: 2023-09-28 | Stop reason: HOSPADM

## 2023-09-21 RX ORDER — INSULIN LISPRO 100 [IU]/ML
0-4 INJECTION, SOLUTION INTRAVENOUS; SUBCUTANEOUS
Status: DISCONTINUED | OUTPATIENT
Start: 2023-09-21 | End: 2023-09-28 | Stop reason: HOSPADM

## 2023-09-21 RX ORDER — SODIUM CHLORIDE 9 MG/ML
INJECTION, SOLUTION INTRAVENOUS PRN
Status: DISCONTINUED | OUTPATIENT
Start: 2023-09-21 | End: 2023-09-24 | Stop reason: SDUPTHER

## 2023-09-21 RX ORDER — LISINOPRIL 10 MG/1
10 TABLET ORAL DAILY
Status: DISCONTINUED | OUTPATIENT
Start: 2023-09-21 | End: 2023-09-28 | Stop reason: HOSPADM

## 2023-09-21 RX ORDER — LATANOPROST 50 UG/ML
1 SOLUTION/ DROPS OPHTHALMIC NIGHTLY
Status: DISCONTINUED | OUTPATIENT
Start: 2023-09-21 | End: 2023-09-28 | Stop reason: HOSPADM

## 2023-09-21 RX ORDER — ONDANSETRON 2 MG/ML
4 INJECTION INTRAMUSCULAR; INTRAVENOUS ONCE
Status: COMPLETED | OUTPATIENT
Start: 2023-09-21 | End: 2023-09-21

## 2023-09-21 RX ORDER — VANCOMYCIN 1.75 G/350ML
1250 INJECTION, SOLUTION INTRAVENOUS EVERY 12 HOURS
Status: DISCONTINUED | OUTPATIENT
Start: 2023-09-21 | End: 2023-09-21

## 2023-09-21 RX ORDER — VANCOMYCIN 2 G/400ML
2000 INJECTION, SOLUTION INTRAVENOUS ONCE
Status: COMPLETED | OUTPATIENT
Start: 2023-09-21 | End: 2023-09-21

## 2023-09-21 RX ORDER — ONDANSETRON 2 MG/ML
4 INJECTION INTRAMUSCULAR; INTRAVENOUS EVERY 6 HOURS PRN
Status: DISCONTINUED | OUTPATIENT
Start: 2023-09-21 | End: 2023-09-28 | Stop reason: HOSPADM

## 2023-09-21 RX ORDER — MORPHINE SULFATE 4 MG/ML
4 INJECTION, SOLUTION INTRAMUSCULAR; INTRAVENOUS ONCE
Status: COMPLETED | OUTPATIENT
Start: 2023-09-21 | End: 2023-09-21

## 2023-09-21 RX ORDER — LEVOTHYROXINE SODIUM 0.05 MG/1
50 TABLET ORAL DAILY
Status: DISCONTINUED | OUTPATIENT
Start: 2023-09-21 | End: 2023-09-28 | Stop reason: HOSPADM

## 2023-09-21 RX ORDER — GLIPIZIDE 5 MG/1
5 TABLET ORAL DAILY
Status: DISCONTINUED | OUTPATIENT
Start: 2023-09-21 | End: 2023-09-25

## 2023-09-21 RX ORDER — METHOCARBAMOL 750 MG/1
750 TABLET, FILM COATED ORAL 4 TIMES DAILY PRN
Status: DISCONTINUED | OUTPATIENT
Start: 2023-09-21 | End: 2023-09-28 | Stop reason: HOSPADM

## 2023-09-21 RX ORDER — HYDROXYZINE HYDROCHLORIDE 25 MG/1
25 TABLET, FILM COATED ORAL 4 TIMES DAILY PRN
Status: DISCONTINUED | OUTPATIENT
Start: 2023-09-21 | End: 2023-09-28 | Stop reason: HOSPADM

## 2023-09-21 RX ORDER — DEXTROSE MONOHYDRATE 100 MG/ML
INJECTION, SOLUTION INTRAVENOUS CONTINUOUS PRN
Status: DISCONTINUED | OUTPATIENT
Start: 2023-09-21 | End: 2023-09-28 | Stop reason: HOSPADM

## 2023-09-21 RX ORDER — ACETAMINOPHEN 325 MG/1
650 TABLET ORAL EVERY 6 HOURS PRN
Status: DISCONTINUED | OUTPATIENT
Start: 2023-09-21 | End: 2023-09-28 | Stop reason: HOSPADM

## 2023-09-21 RX ORDER — ONDANSETRON 4 MG/1
4 TABLET, ORALLY DISINTEGRATING ORAL EVERY 8 HOURS PRN
Status: DISCONTINUED | OUTPATIENT
Start: 2023-09-21 | End: 2023-09-28 | Stop reason: HOSPADM

## 2023-09-21 RX ORDER — FUROSEMIDE 20 MG/1
20 TABLET ORAL DAILY
Status: DISCONTINUED | OUTPATIENT
Start: 2023-09-21 | End: 2023-09-28 | Stop reason: HOSPADM

## 2023-09-21 RX ORDER — IPRATROPIUM BROMIDE AND ALBUTEROL SULFATE 2.5; .5 MG/3ML; MG/3ML
1 SOLUTION RESPIRATORY (INHALATION)
Status: DISCONTINUED | OUTPATIENT
Start: 2023-09-21 | End: 2023-09-23

## 2023-09-21 RX ORDER — ACETAMINOPHEN 650 MG/1
650 SUPPOSITORY RECTAL EVERY 6 HOURS PRN
Status: DISCONTINUED | OUTPATIENT
Start: 2023-09-21 | End: 2023-09-28 | Stop reason: HOSPADM

## 2023-09-21 RX ORDER — ALBUTEROL SULFATE 2.5 MG/3ML
2.5 SOLUTION RESPIRATORY (INHALATION) EVERY 4 HOURS PRN
Status: DISCONTINUED | OUTPATIENT
Start: 2023-09-21 | End: 2023-09-28 | Stop reason: HOSPADM

## 2023-09-21 RX ORDER — SODIUM CHLORIDE 0.9 % (FLUSH) 0.9 %
5-40 SYRINGE (ML) INJECTION PRN
Status: DISCONTINUED | OUTPATIENT
Start: 2023-09-21 | End: 2023-09-28 | Stop reason: HOSPADM

## 2023-09-21 RX ORDER — INSULIN LISPRO 100 [IU]/ML
0-4 INJECTION, SOLUTION INTRAVENOUS; SUBCUTANEOUS NIGHTLY
Status: DISCONTINUED | OUTPATIENT
Start: 2023-09-21 | End: 2023-09-28 | Stop reason: HOSPADM

## 2023-09-21 RX ADMIN — FUROSEMIDE 20 MG: 20 TABLET ORAL at 17:48

## 2023-09-21 RX ADMIN — Medication 4.5 MG: at 20:53

## 2023-09-21 RX ADMIN — LISINOPRIL 10 MG: 10 TABLET ORAL at 17:48

## 2023-09-21 RX ADMIN — IPRATROPIUM BROMIDE AND ALBUTEROL SULFATE 1 DOSE: 2.5; .5 SOLUTION RESPIRATORY (INHALATION) at 21:56

## 2023-09-21 RX ADMIN — GLIPIZIDE 5 MG: 5 TABLET ORAL at 17:48

## 2023-09-21 RX ADMIN — APIXABAN 5 MG: 5 TABLET, FILM COATED ORAL at 20:53

## 2023-09-21 RX ADMIN — EMPAGLIFLOZIN 25 MG: 25 TABLET, FILM COATED ORAL at 17:48

## 2023-09-21 RX ADMIN — LEVOTHYROXINE SODIUM 50 MCG: 0.05 TABLET ORAL at 17:48

## 2023-09-21 RX ADMIN — Medication 10 ML: at 20:58

## 2023-09-21 RX ADMIN — GABAPENTIN 600 MG: 300 CAPSULE ORAL at 17:48

## 2023-09-21 RX ADMIN — ATORVASTATIN CALCIUM 40 MG: 40 TABLET, FILM COATED ORAL at 20:53

## 2023-09-21 RX ADMIN — CEFEPIME 2000 MG: 2 INJECTION, POWDER, FOR SOLUTION INTRAVENOUS at 12:12

## 2023-09-21 RX ADMIN — ONDANSETRON 4 MG: 2 INJECTION INTRAMUSCULAR; INTRAVENOUS at 07:37

## 2023-09-21 RX ADMIN — APIXABAN 5 MG: 5 TABLET, FILM COATED ORAL at 17:51

## 2023-09-21 RX ADMIN — VANCOMYCIN 2000 MG: 2 INJECTION, SOLUTION INTRAVENOUS at 12:46

## 2023-09-21 RX ADMIN — MOMETASONE FUROATE AND FORMOTEROL FUMARATE DIHYDRATE 2 PUFF: 200; 5 AEROSOL RESPIRATORY (INHALATION) at 21:56

## 2023-09-21 RX ADMIN — METHOCARBAMOL TABLETS 750 MG: 750 TABLET, COATED ORAL at 20:54

## 2023-09-21 RX ADMIN — GABAPENTIN 600 MG: 300 CAPSULE ORAL at 20:53

## 2023-09-21 RX ADMIN — MORPHINE SULFATE 4 MG: 4 INJECTION, SOLUTION INTRAMUSCULAR; INTRAVENOUS at 07:39

## 2023-09-21 ASSESSMENT — PAIN SCALES - GENERAL
PAINLEVEL_OUTOF10: 10
PAINLEVEL_OUTOF10: 10
PAINLEVEL_OUTOF10: 4
PAINLEVEL_OUTOF10: 10
PAINLEVEL_OUTOF10: 10

## 2023-09-21 ASSESSMENT — PAIN - FUNCTIONAL ASSESSMENT
PAIN_FUNCTIONAL_ASSESSMENT: PREVENTS OR INTERFERES SOME ACTIVE ACTIVITIES AND ADLS
PAIN_FUNCTIONAL_ASSESSMENT: 0-10
PAIN_FUNCTIONAL_ASSESSMENT: ACTIVITIES ARE NOT PREVENTED
PAIN_FUNCTIONAL_ASSESSMENT: PREVENTS OR INTERFERES SOME ACTIVE ACTIVITIES AND ADLS
PAIN_FUNCTIONAL_ASSESSMENT: PREVENTS OR INTERFERES SOME ACTIVE ACTIVITIES AND ADLS

## 2023-09-21 ASSESSMENT — ENCOUNTER SYMPTOMS
SORE THROAT: 0
NAUSEA: 0
BACK PAIN: 0
SHORTNESS OF BREATH: 0
EYE PAIN: 0
VOMITING: 0
COUGH: 0
ABDOMINAL PAIN: 0

## 2023-09-21 ASSESSMENT — LIFESTYLE VARIABLES
HOW OFTEN DO YOU HAVE A DRINK CONTAINING ALCOHOL: NEVER
HOW MANY STANDARD DRINKS CONTAINING ALCOHOL DO YOU HAVE ON A TYPICAL DAY: PATIENT DOES NOT DRINK

## 2023-09-21 ASSESSMENT — PATIENT HEALTH QUESTIONNAIRE - PHQ9
SUM OF ALL RESPONSES TO PHQ QUESTIONS 1-9: 0
1. LITTLE INTEREST OR PLEASURE IN DOING THINGS: 0
2. FEELING DOWN, DEPRESSED OR HOPELESS: 0
SUM OF ALL RESPONSES TO PHQ QUESTIONS 1-9: 0
SUM OF ALL RESPONSES TO PHQ9 QUESTIONS 1 & 2: 0

## 2023-09-21 ASSESSMENT — PAIN DESCRIPTION - LOCATION
LOCATION: LEG

## 2023-09-21 ASSESSMENT — PAIN DESCRIPTION - DESCRIPTORS
DESCRIPTORS: ACHING;DISCOMFORT;DULL;GNAWING
DESCRIPTORS: SHARP;STABBING;BURNING
DESCRIPTORS: SHARP;ACHING
DESCRIPTORS: SHARP
DESCRIPTORS: ACHING;SHARP

## 2023-09-21 ASSESSMENT — PAIN DESCRIPTION - FREQUENCY
FREQUENCY: CONTINUOUS
FREQUENCY: INTERMITTENT

## 2023-09-21 ASSESSMENT — PAIN DESCRIPTION - ORIENTATION
ORIENTATION: RIGHT
ORIENTATION: LEFT;RIGHT
ORIENTATION: RIGHT

## 2023-09-21 ASSESSMENT — PAIN DESCRIPTION - PAIN TYPE: TYPE: ACUTE PAIN

## 2023-09-21 NOTE — PROGRESS NOTES
Physical Therapy  Facility/Department: [facility]  Physical Therapy Initial Assessment    Name: Flower Luke  : 1955  MRN: [id number]  Date of Service: 2023    Discharge Recommendations:  Continue to assess pending progress, Patient would benefit from continued therapy after discharge, Home with Home health PT     Flower Luke scored a 17/24 on the AM-PAC short mobility form. Current research shows that an AM-PAC score of 18 or greater is typically associated with a discharge to the patient's home setting. Based on the patient's AM-PAC score and their current functional mobility deficits, it is recommended that the patient have 2-3 sessions per week of Physical Therapy at d/c to increase the patient's independence. At this time, this patient demonstrates the endurance and safety to discharge home with HHPT and a follow up treatment frequency of 2-3x/wk. Please see assessment section for further patient specific details. If patient discharges prior to next session this note will serve as a discharge summary. Please see below for the latest assessment towards goals. Patient Diagnosis(es): The primary encounter diagnosis was Cellulitis of right lower extremity. A diagnosis of Urinary tract infection without hematuria, site unspecified was also pertinent to this visit. Past Medical History:  has a past medical history of Chronic diastolic CHF (congestive heart failure) ([address]), DM (diabetes mellitus), type 2 ([address]), HTN (hypertension), and Sleep apnea. Past Surgical History:  has no past surgical history on file. Assessment   Body Structures, Functions, Activity Limitations Requiring Skilled Therapeutic Intervention: Decreased functional mobility ; Decreased strength;Decreased endurance;Decreased balance; Increased pain  Assessment: Patient is a 69 yo male presenting with decreased functional mobility s/p cellulitis and increased BLE pain.  PMH includes chest pain, stroke, UTI, and

## 2023-09-21 NOTE — ED NOTES
Urinal replaced at bedside for patient when ready to use; patient educated on how to use it; patient verbalized understanding      Zuleika Carrington RN  09/21/23 9929

## 2023-09-21 NOTE — ED NOTES
[employer] sandwich and apple sauce provided to patient; EDT still at bedside attempting to obtain [facility] prior to ABX administration      Cally Vargas RN  09/21/23 7651

## 2023-09-21 NOTE — ED NOTES
RN to patient bedside and found patient sleeping; RN woke patient easily and asked if patient has sleep apnea; patient states he wears 2L at home for sleep; he can't tolerate c-pap mask; RN placed 2L nasal canula and informed MADIE Sumner RN  09/21/23 8123

## 2023-09-21 NOTE — PROGRESS NOTES
Patient admitted from  ED to room 4126 due to BLE tenderness, redness, swelling and pain. Patient is  alert and oriented x 4. Vitals stable on room air. Answers questions appropriately. Utilized Oxygen 2.5 L via NC at night, states that he has sleep apnea but finds CPAP uncomfortable. Patient ambulated to bathroom  Gait steady  Utilizes standard bariatric walker     Lungs clear bilaterally  S 1 S 2 audible,   Bowels sounds active. Patient continent of bowel and bladder. Skin impairment as indicated below  BLE redness, edema  Old scratches and redness to sacrum  Red, moist abdominal pannus  Open area pencil eraser size to right posterior ankle  Dollar coin size scab to lateral lower right extremity  Redness to left axillary  Varicosities to bilateral ankles  Vascular discoloration to BLE       Orders reviewed with patient.   Call light and belongings placed within reach     No needs voiced at this time

## 2023-09-21 NOTE — H&P
Hospital Medicine History & Physical      PCP: MARGARITO Gurrola    Date of Admission: 9/21/2023    Date of Service: Pt seen/examined on 9/21/23 and Admitted to Inpatient     Chief Complaint: LE edema    History Of Present Illness: The patient is a 70 y.o. male who presents to [facility] with LE edema, right leg swelling, pain and redness since this morning. Also complained of some discomfort with urination at that he may have a urinary tract infection. States he has h/o heart failure and needs to be on water pills. Past Medical History:        Diagnosis Date    Chronic diastolic CHF (congestive heart failure) ([facility])     DM (diabetes mellitus), type 2 (HCC)     HTN (hypertension)     Sleep apnea        Past Surgical History:    No past surgical history on file. Medications Prior to Admission:    Prior to Admission medications    Medication Sig Start Date End Date Taking? Authorizing Provider   levothyroxine (SYNTHROID) 50 MCG tablet Take 1 tablet by mouth Daily   Yes Historical Provider, MD   furosemide (LASIX) 20 MG tablet Take 1 tablet by mouth daily 9/19/23   MARGARITO Gurrola   nystatin (MYCOSTATIN) POWD powder Apply 1 each topically every 6 hours as needed (Rash) To groin for yeast 9/19/23   MARGARITO Gurrola   hydrOXYzine HCl (ATARAX) 25 MG tablet Take 1 tablet by mouth 4 times daily as needed for Itching 9/19/23 11/18/23  MARGARITO Garcia   gabapentin (NEURONTIN) 300 MG capsule Take 2 capsules by mouth 2 times daily for 30 days.  Intended supply: 30 days 9/19/23 10/19/23  MARGARITO Gurrola   albuterol sulfate (PROAIR RESPICLICK) 905 (90 Base) MCG/ACT aerosol powder inhalation Inhale 2 puffs into the lungs every 4 hours as needed for Wheezing or Shortness of Breath    Historical Provider, MD   albuterol (PROVENTIL) (2.5 MG/3ML) 0.083% nebulizer

## 2023-09-21 NOTE — ED NOTES
Coffee and OJ provided to patient per request; no other needs at this time      Jair Roach, RN  09/21/23 3719

## 2023-09-21 NOTE — ED NOTES
Patient called out to this RN while RN was up on the floor transporting patient stating he dropped something; RN called EDT to go assist patient;     EDT informed this RN that patient dropped urinal and urine was on the floor; EDT cleaned up mess, unable to document amount of urine.  Patient requested from EDT food and drink, EDT informed patient they will consult w/ RN when returned from the floor; patient acknowledged and had no further questions per EDT report to this RN;          Wilmer Reeves RN  09/21/23 8276

## 2023-09-21 NOTE — PROGRESS NOTES
Occupational Therapy  Facility/Department: [facility]  Occupational Therapy Initial Assessment    Name: Kiya Echevarria  : 1955  MRN: [id number]  Date of Service: 2023    Discharge Recommendations:  2-3 sessions per week, Patient would benefit from continued therapy after discharge     Kiya Echevarria scored a 17/24 on the AM-PAC ADL Inpatient form. Current research shows that an AM-PAC score of 18 or greater is typically associated with a discharge to the patient's home setting. Based on the patient's AM-PAC score, and their current ADL deficits, it is recommended that the patient have 2-3 sessions per week of Occupational Therapy at d/c to increase the patient's independence. At this time, this patient demonstrates the endurance and safety to discharge home with home/OP services and a follow up treatment frequency of 2-3x/wk. Please see assessment section for further patient specific details. If patient discharges prior to next session this note will serve as a discharge summary. Please see below for the latest assessment towards goals. Patient Diagnosis(es): The primary encounter diagnosis was Cellulitis of right lower extremity. A diagnosis of Urinary tract infection without hematuria, site unspecified was also pertinent to this visit. Past Medical History:  has a past medical history of Chronic diastolic CHF (congestive heart failure) ([address]), DM (diabetes mellitus), type 2 ([address]), HTN (hypertension), and Sleep apnea. Past Surgical History:  has no past surgical history on file. Treatment Diagnosis: decreased ADL status/fxl mobility/strength/ROM    Assessment   Performance deficits / Impairments: Decreased functional mobility ; Decreased strength;Decreased endurance;Decreased ADL status; Decreased balance;Decreased ROM; Decreased safe awareness  Assessment: Per ED Provider note on 23, \"Right leg swelling pain and redness he noticed this morning.  Thinking about it and bit by assistance  Toilet Transfers Comments: use of R grab bar for support  AROM: Generally decreased, functional (L shoulder flexion limited to ~90 degrees secondary to rotator cuff injury; R UE WFL)  Strength: Generally decreased, functional (did not test in L UE due to pt reporting painful secondary to rotatory cuff injury; R UE WFL)  Coordination: Generally decreased, functional (pt able to complete opposition to all fingers slowly and able to open/close fist with increased time; R UE WFL)  Tone: Normal  ADL  UE Dressing: Setup  UE Dressing Skilled Clinical Factors: to don t shirt with set up assist only seated in recliner  LE Dressing: Maximum assistance  LE Dressing Skilled Clinical Factors: maxA to don bilateral socks; pt attempting to don R sock seated in recliner and very effortful for pt to complete  Additional Comments: Anticipate pt will require maxA LB bathing, Sierra toileting, CGA UB bathing/dressing, CGA grooming due to strength, balance and endurance observed this session.      Activity Tolerance  Activity Tolerance: Patient tolerated evaluation without incident  Bed mobility  Supine to Sit: Unable to assess  Sit to Supine: Unable to assess  Bed Mobility Comments: session began/ended in recliner  Transfers  Sit to stand: Stand by assistance  Stand to sit: Stand by assistance  Transfer Comments: Bariatric walker; recliner<>walker with SBA  Vision  Vision: Impaired  Vision Exceptions: Wears glasses at all times  Hearing  Hearing: Within functional limits  Cognition  Overall Cognitive Status: WNL  Cognition Comment: bit impulsive  Orientation  Overall Orientation Status: Within Normal Limits               Functional Mobility Circuit Training: Pt ambulated 20 ft with SBA and bariatric walker  Static Sitting Balance Exercises: SUP seated on toilet  Dynamic Sitting Balance Exercises: SBA to attempt LB dressing seated in recliner  Static Standing Balance Exercises: SBA with walker in prep for fxl

## 2023-09-21 NOTE — ED NOTES
Handoff report given to Lisa Peña, RN all question answer; receiving Rn acknowledged report and had no further question      Jana Sunshine RN  09/21/23 1972

## 2023-09-21 NOTE — CONSULTS
Infectious Diseases Inpatient Consult Note      Reason for Consult:  Rt leg extensive cellulitis and fevers with wbc elevation     Requesting Physician:  Dr. Maria Teresa Hooks    Primary Care Physician:  MARGARITO Mercedes    History Obtained From:  Epic and pt     CHIEF COMPLAINT:     Chief Complaint   Patient presents with    Leg Pain     PT into ED from home w/ c/o 10/10 R leg pain x3 months. Pt states that he also hit his R leg on an object 1 wk ago. Scabbed area to R leg. Redness and swelling to BLE. Pt states that he has rx prescription pain medication that he previous took, but is out at this time. Dysuria     Pt also c/o dysuria, and foul odor with urination x2 months. Pt states he has had cold chills. Pt tachycardic on triage. A/ox4. HISTORY OF PRESENT ILLNESS:  70 y.o.  man with DM.,  Congestive heart failure, hypertension, obstructive sleep apnea, morbid obesity BMI 44 admitted to hospital secondary to fever and right leg pain swelling redness. Patient sustained injury to the right leg on the lateral aspect of the LEG Associated with cellulitis extending up to the knee AND  has a Scab. He has severe throbbing pain and presented to hospital for further evaluation. Labs significant 0.9 WBC 17 with left shift hemoglobin 18.2, blood culture in process. He was also c/o some increased urinary frequency with  some burning with urination  Location :Rt leg pain swelling    Quality :   Aching throbbing  Severity : 10 out of 10  Duration : 3 days  Timing : Constant   context : Trauma to the right leg  Modifying factors : None  Associated signs and symptoms: Local skin changes, swelling, redness, pain, low-grade fever        Past Medical History:    Past Medical History:   Diagnosis Date    Chronic diastolic CHF (congestive heart failure) (HCC)     DM (diabetes mellitus), type 2 (HCC)     HTN (hypertension)     Sleep apnea        Past Surgical History:    No past surgical history on file.     Current Meds:   apixaban  5 mg Oral BID    atorvastatin  40 mg Oral Nightly    Dulaglutide  3 mg SubCUTAneous Weekly    empagliflozin  25 mg Oral Daily    furosemide  20 mg Oral Daily    gabapentin  600 mg Oral BID    glipiZIDE  5 mg Oral Daily    latanoprost  1 drop Both Eyes Nightly    levothyroxine  50 mcg Oral Daily    lisinopril  10 mg Oral Daily    sodium chloride flush  5-40 mL IntraVENous 2 times per day    vancomycin  1,250 mg IntraVENous Q12H    insulin lispro  0-4 Units SubCUTAneous TID WC    insulin lispro  0-4 Units SubCUTAneous Nightly    [START ON 9/22/2023] cefepime  2,000 mg IntraVENous Q12H    mometasone-formoterol  2 puff Inhalation BID RT    tiotropium  2 puff Inhalation Daily RT       Continuous Infusions:   sodium chloride      dextrose         PRN Meds:  albuterol, albuterol sulfate HFA, hydrOXYzine HCl, melatonin, methocarbamol, sodium chloride flush, sodium chloride, ondansetron **OR** ondansetron, polyethylene glycol, acetaminophen **OR** acetaminophen, glucose, dextrose bolus **OR** dextrose bolus, glucagon (rDNA), dextrose    Imaging:   XR TIBIA FIBULA RIGHT (2 VIEWS)   Final Result   Questionable pattern of soft tissue swelling of the right leg. There is no   evidence of osteomyelitis. VL Extremity Venous Right             All pertinent images and reports for the current Hospitalization were reviewed by me. IMPRESSION:    Patient Active Problem List   Diagnosis Code    Chest pain, rule out acute myocardial infarction R07.9    Chest pain R07.9    Cellulitis L03.90        ICD-10-CM    1. Cellulitis of right lower extremity  L03.115       2.  Urinary tract infection without hematuria, site unspecified  N39.0          Rt leg extensive cellulitis  WBC elevation   Rt leg edema  Urinary symptoms concern for UTI  UA abnormal   Urine cx in process  Obesity BMI at  44  CHF  DM  LISA  Rt leg injury with scab formation portal of entry  Blood cx in process  Rt leg venous duplex -ve  On chronic

## 2023-09-21 NOTE — ED NOTES
Contacted EDPA to inquire if he would want BC prior to administering ABX; PA requesting BC prior to administering and will be placing orders for them      Byron Keller RN  09/21/23 3660

## 2023-09-21 NOTE — ED NOTES
Patient made comments to this RN during the administration of pain medications \"better watch it giving me morphine will make me very horny. \" RN informed patient that it was inappropriate to talk that way and this RN will not tolerate to be talked that way and patient acknowledged and stated he was joking; Afterwards patient was informed EDPA is requesting a urine sample and informed that RN would give a urinal when ready to provide sample d/t limited ambulation from right foot pain and increase fall risk. Patient stated that \"I'm going to need more than just a basic urinal I use a tin can at home because I'm so big\"   RN informed that the urinal is all the hospital have.  Patient again that is an inappropriate comment and RN will not tolerate that talk in the department, patient again acknowledged         Oksana Browne, RN  09/21/23 [address] , RN  09/21/23 0170

## 2023-09-21 NOTE — ED NOTES
Handoff report given to [facility], RN to assume care. No further questions at this time. I will place transport to room 4126. If any questions arise, please don't hesitate to call [phone].      Kayley Houston RN  09/21/23 1491

## 2023-09-21 NOTE — ED NOTES
This RN into left patient know the inappropriate comments will not be tolerated. He said his nurse told him the same thing and we have made our point and he will not do it again.        Heraclio Thomas RN  09/21/23 6401

## 2023-09-21 NOTE — ED NOTES
Introduce myself to the patient, identification band inplace, stretcher in the lowest position for safety, and the call light is in reach. Updated patient on Emergency Department plan of care, no additional needs at this time, will continue with care.         Cresencio Libman, RN  09/21/23 7057

## 2023-09-21 NOTE — ED NOTES
Patient requesting something to eat and drink; EDPA is okay with patient having food and drinks;      Hailee Booth RN  09/21/23 8128

## 2023-09-21 NOTE — ED TRIAGE NOTES
PT into ED from home w/ c/o 10/10 R leg pain x3 months. Pt states that he also hit his R leg on an object 1 wk ago. Scabbed area to R leg. Redness and swelling to BLE. Pt states that he has rx prescription pain medication that he previous took, but is out at this time. Pt also c/o dysuria, and foul odor with urination x2 months. Pt states he has had cold chills. Pt tachycardic on triage. A/ox4.

## 2023-09-21 NOTE — PROGRESS NOTES
Medication Reconciliation    List of medications patient is currently taking is complete. Source of information: 1. Conversation with patient at bedside                                      2. EPIC records      Allergies  Patient has no known allergies. Notes regarding home medications:   1. Patient did not receive any of his home medications prior to arrival to the ER today.     Sheela Torres RPH, PharmD, BCPS  9/21/2023 11:30 AM

## 2023-09-21 NOTE — ED PROVIDER NOTES
[address] [facility] [address] [address]        Pt Name: Krystal Verduzco  MRN: [id number]  [address] [facility] 1955  Date of evaluation: 9/21/2023  Provider: Brandon Kirby PA-C  PCP: MARGARITO Early  Note Started: 9:16 AM EDT 9/21/23      ALICIA. I have evaluated this patient. CHIEF COMPLAINT       Chief Complaint   Patient presents with    Leg Pain     PT into ED from home w/ c/o 10/10 R leg pain x3 months. Pt states that he also hit his R leg on an object 1 wk ago. Scabbed area to R leg. Redness and swelling to BLE. Pt states that he has rx prescription pain medication that he previous took, but is out at this time. Dysuria     Pt also c/o dysuria, and foul odor with urination x2 months. Pt states he has had cold chills. Pt tachycardic on triage. A/ox4. HISTORY OF PRESENT ILLNESS: 1 or more Elements     History From: Patient    Limitations to history : None    Social Determinants Significantly Affecting Health : None    Chief Complaint: Right leg swelling pain and redness he noticed this morning. Thinking about it and bit by something. Also complained of some discomfort with urination at that he may have a urinary tract infection. Denies fever. No nausea vomiting. Denies chest pain, no abdominal pain. No extremity weakness. No other complaints. Reilly Verduzco is a 70 y.o. male who presents emergency room with above complaint. Nursing Notes were all reviewed and agreed with or any disagreements were addressed in the HPI. REVIEW OF SYSTEMS :      Review of Systems   Constitutional:  Negative for chills and fever. HENT:  Negative for congestion and sore throat. Eyes:  Negative for pain and visual disturbance. Respiratory:  Negative for cough and shortness of breath. Cardiovascular:  Negative for chest pain and leg swelling. Gastrointestinal:  Negative for abdominal pain, nausea and vomiting. Genitourinary:  Positive for dysuria. 18.2 medical 53.7. CMP shows total bilirubin 1.7. All other values are normal limits. Lactic acid 1.9      X-ray tib-fib shows questionable pattern of soft tissue swelling of the right leg. There is no evidence of osteomyelitis. Venous Doppler shows no evidence of DVT. Limited due to patient unable to tolerate the probe due to pain. At this point I did discuss patient exam finding as well as his lab results and venous Doppler and x-ray result from today with him. Looks like he has a urinary tract infection also no evidence of DVT still unable to treat him for cellulitis. We will start him on vancomycin and cefepime. Patient is okay with the admission plan. I informed him that hospitalist will do the admission. Also informed him that if he would like something to eat let his nurse know. He will be admitted in stable condition. Disposition Considerations (tests considered but not done, Admit vs D/C, Shared Decision Making, Pt Expectation of Test or Tx.):     See discussion above      I am the Primary Clinician of Record. FINAL IMPRESSION      1. Cellulitis of right lower extremity    2. Urinary tract infection without hematuria, site unspecified          DISPOSITION/PLAN     DISPOSITION Decision To Admit 09/21/2023 10:56:53 AM      PATIENT REFERRED TO:  No follow-up provider specified. DISCHARGE MEDICATIONS:  New Prescriptions    No medications on file       DISCONTINUED MEDICATIONS:  Discontinued Medications    GABAPENTIN (NEURONTIN) 600 MG TABLET    Take 600 mg by mouth 3 times daily.               (Please note that portions of this note were completed with a voice recognition program.  Efforts were made to edit the dictations but occasionally words are mis-transcribed.)    Nicole Mckeon PA-C (electronically signed)        Nicole Mckeon PA-C  09/21/23 8411

## 2023-09-21 NOTE — ED NOTES
Patient is in xray per report from [address] [facility]; patient hasn't arrived to ED room [address], RN  09/21/23 8621

## 2023-09-21 NOTE — ED NOTES
RN to EDT inquiring about collecting [facility] before RN administer ABX; EDT will be en route once finished other task being preformed in the department; RN acknowledged      Gonzales Heredia RN  09/21/23 7760

## 2023-09-21 NOTE — PROGRESS NOTES
4 Eyes Skin Assessment     NAME:  Zeina Bazzi OF BIRTH:  1955  MEDICAL RECORD NUMBER:  [id number]    The patient is being assessed for  Admission    I agree that at least one RN has performed a thorough Head to Toe Skin Assessment on the patient. ALL assessment sites listed below have been assessed. Areas assessed by both nurses:    Head, Face, Ears, Shoulders, Back, Chest, Arms, Elbows, Hands, Sacrum. Buttock, Coccyx, Ischium, and Legs. Feet and Heels        Does the Patient have a Wound? Yes wound(s) were present on assessment. LDA wound assessment was Initiated and completed by RN    Patient has BLE redness, edema  Old scratches and redness to sacrum  Red, moist abdominal pannus  Open area pencil eraser size to right posterior ankle  Dollar coin size scab to lateral lower right extremity  Redness to left axillary  Varicosities to bilateral ankles  Vascular discoloration to BLE  Noted.       Gianni Prevention initiated by RN: No  Wound Care Orders initiated by RN: No    Pressure Injury (Stage 3,4, Unstageable, DTI, NWPT, and Complex wounds) if present, place Wound referral order by RN under : No    New Ostomies, if present place, Ostomy referral order under : No     Nurse 1 eSignature: Electronically signed by Priti Chaidez RN on 9/21/23 at 5:59 PM EDT    **SHARE this note so that the co-signing nurse can place an eSignature**    Nurse 2 eSignature: Electronically signed by Sylvia Ly RN on 9/21/23 at 6:49 PM EDT

## 2023-09-21 NOTE — ED NOTES
This RN informed charge RN of patient comments towards this RN, informing charge that this RN felt uncomfortable w/ patient comments but patient acknowledged this RN education;      Charge RN stated she will also go also re-enforce education to patient of hospital policy      Saibha Gustafson  09/21/23 1038

## 2023-09-21 NOTE — CARE COORDINATION
SW attempted to meet with patient to review possible discharge needs, however, he was asleep. SERINA will try again later if time permits. Respectfully submitted,    Sarah MELCHOR, [facility]   [phone]    Electronically signed by JILL Valera, LSW on 9/21/2023 at 4:38 PM

## 2023-09-22 ENCOUNTER — APPOINTMENT (OUTPATIENT)
Dept: GENERAL RADIOLOGY | Age: 68
End: 2023-09-22
Payer: MEDICARE

## 2023-09-22 ENCOUNTER — APPOINTMENT (OUTPATIENT)
Dept: CT IMAGING | Age: 68
End: 2023-09-22
Payer: MEDICARE

## 2023-09-22 PROBLEM — E11.628 TYPE 2 DIABETES MELLITUS WITH SKIN COMPLICATION (HCC): Status: ACTIVE | Noted: 2023-09-22

## 2023-09-22 PROBLEM — Z79.01 ANTICOAGULATED: Status: ACTIVE | Noted: 2023-09-22

## 2023-09-22 PROBLEM — M79.604 PAIN OF RIGHT LOWER EXTREMITY: Status: ACTIVE | Noted: 2023-09-22

## 2023-09-22 PROBLEM — D72.9 NEUTROPHILIA: Status: ACTIVE | Noted: 2023-09-22

## 2023-09-22 PROBLEM — G47.33 OSA (OBSTRUCTIVE SLEEP APNEA): Status: ACTIVE | Noted: 2023-09-22

## 2023-09-22 PROBLEM — D72.828 NEUTROPHILIA: Status: ACTIVE | Noted: 2023-09-22

## 2023-09-22 PROBLEM — E66.01 MORBID OBESITY WITH BMI OF 40.0-44.9, ADULT (HCC): Status: ACTIVE | Noted: 2023-09-22

## 2023-09-22 PROBLEM — N39.0 URINARY TRACT INFECTION WITHOUT HEMATURIA: Status: ACTIVE | Noted: 2023-09-22

## 2023-09-22 LAB
ANION GAP SERPL CALCULATED.3IONS-SCNC: 9 MMOL/L (ref 3–16)
ASO AB SERPL-ACNC: 34 IU/ML (ref 0–200)
BACTERIA UR CULT: NORMAL
BASOPHILS # BLD: 0 K/UL (ref 0–0.2)
BASOPHILS NFR BLD: 0.3 %
BUN SERPL-MCNC: 11 MG/DL (ref 7–20)
CALCIUM SERPL-MCNC: 8.4 MG/DL (ref 8.3–10.6)
CHLORIDE SERPL-SCNC: 103 MMOL/L (ref 99–110)
CO2 SERPL-SCNC: 24 MMOL/L (ref 21–32)
CREAT SERPL-MCNC: 0.9 MG/DL (ref 0.8–1.3)
CRP SERPL-MCNC: 181.8 MG/L (ref 0–5.1)
DEPRECATED RDW RBC AUTO: 16.5 % (ref 12.4–15.4)
EOSINOPHIL # BLD: 0.1 K/UL (ref 0–0.6)
EOSINOPHIL NFR BLD: 1.3 %
ERYTHROCYTE [SEDIMENTATION RATE] IN BLOOD BY WESTERGREN METHOD: 34 MM/HR (ref 0–20)
EST. AVERAGE GLUCOSE BLD GHB EST-MCNC: 108.3 MG/DL
GFR SERPLBLD CREATININE-BSD FMLA CKD-EPI: >60 ML/MIN/{1.73_M2}
GLUCOSE BLD-MCNC: 102 MG/DL (ref 70–99)
GLUCOSE BLD-MCNC: 130 MG/DL (ref 70–99)
GLUCOSE BLD-MCNC: 70 MG/DL (ref 70–99)
GLUCOSE BLD-MCNC: 75 MG/DL (ref 70–99)
GLUCOSE BLD-MCNC: 81 MG/DL (ref 70–99)
GLUCOSE BLD-MCNC: 96 MG/DL (ref 70–99)
GLUCOSE SERPL-MCNC: 85 MG/DL (ref 70–99)
HBA1C MFR BLD: 5.4 %
HCT VFR BLD AUTO: 47.2 % (ref 40.5–52.5)
HGB BLD-MCNC: 16.1 G/DL (ref 13.5–17.5)
LYMPHOCYTES # BLD: 1 K/UL (ref 1–5.1)
LYMPHOCYTES NFR BLD: 10.8 %
MCH RBC QN AUTO: 29.7 PG (ref 26–34)
MCHC RBC AUTO-ENTMCNC: 34.2 G/DL (ref 31–36)
MCV RBC AUTO: 86.9 FL (ref 80–100)
MONOCYTES # BLD: 1.1 K/UL (ref 0–1.3)
MONOCYTES NFR BLD: 12.3 %
MRSA DNA SPEC QL NAA+PROBE: NORMAL
NEUTROPHILS # BLD: 6.9 K/UL (ref 1.7–7.7)
NEUTROPHILS NFR BLD: 75.3 %
NT-PROBNP SERPL-MCNC: 355 PG/ML (ref 0–124)
PERFORMED ON: ABNORMAL
PERFORMED ON: ABNORMAL
PERFORMED ON: NORMAL
PLATELET # BLD AUTO: 158 K/UL (ref 135–450)
PMV BLD AUTO: 8.8 FL (ref 5–10.5)
POTASSIUM SERPL-SCNC: 4 MMOL/L (ref 3.5–5.1)
RBC # BLD AUTO: 5.43 M/UL (ref 4.2–5.9)
SODIUM SERPL-SCNC: 136 MMOL/L (ref 136–145)
TSH SERPL DL<=0.005 MIU/L-ACNC: 1.28 UIU/ML (ref 0.27–4.2)
WBC # BLD AUTO: 9.2 K/UL (ref 4–11)

## 2023-09-22 PROCEDURE — 2580000003 HC RX 258: Performed by: INTERNAL MEDICINE

## 2023-09-22 PROCEDURE — 6370000000 HC RX 637 (ALT 250 FOR IP): Performed by: INTERNAL MEDICINE

## 2023-09-22 PROCEDURE — 6360000002 HC RX W HCPCS: Performed by: INTERNAL MEDICINE

## 2023-09-22 PROCEDURE — 99233 SBSQ HOSP IP/OBS HIGH 50: CPT | Performed by: INTERNAL MEDICINE

## 2023-09-22 PROCEDURE — 2700000000 HC OXYGEN THERAPY PER DAY

## 2023-09-22 PROCEDURE — 71046 X-RAY EXAM CHEST 2 VIEWS: CPT

## 2023-09-22 PROCEDURE — 73702 CT LWR EXTREMITY W/O&W/DYE: CPT

## 2023-09-22 PROCEDURE — 1200000000 HC SEMI PRIVATE

## 2023-09-22 PROCEDURE — 93005 ELECTROCARDIOGRAM TRACING: CPT | Performed by: NURSE PRACTITIONER

## 2023-09-22 PROCEDURE — 94640 AIRWAY INHALATION TREATMENT: CPT

## 2023-09-22 PROCEDURE — 2500000003 HC RX 250 WO HCPCS: Performed by: REGISTERED NURSE

## 2023-09-22 PROCEDURE — 85025 COMPLETE CBC W/AUTO DIFF WBC: CPT

## 2023-09-22 PROCEDURE — 86140 C-REACTIVE PROTEIN: CPT

## 2023-09-22 PROCEDURE — 94761 N-INVAS EAR/PLS OXIMETRY MLT: CPT

## 2023-09-22 PROCEDURE — 85652 RBC SED RATE AUTOMATED: CPT

## 2023-09-22 PROCEDURE — 80048 BASIC METABOLIC PNL TOTAL CA: CPT

## 2023-09-22 PROCEDURE — 84443 ASSAY THYROID STIM HORMONE: CPT

## 2023-09-22 PROCEDURE — 83880 ASSAY OF NATRIURETIC PEPTIDE: CPT

## 2023-09-22 PROCEDURE — 36415 COLL VENOUS BLD VENIPUNCTURE: CPT

## 2023-09-22 PROCEDURE — 99222 1ST HOSP IP/OBS MODERATE 55: CPT | Performed by: NURSE PRACTITIONER

## 2023-09-22 PROCEDURE — 86060 ANTISTREPTOLYSIN O TITER: CPT

## 2023-09-22 PROCEDURE — 6360000004 HC RX CONTRAST MEDICATION: Performed by: INTERNAL MEDICINE

## 2023-09-22 PROCEDURE — 93306 TTE W/DOPPLER COMPLETE: CPT

## 2023-09-22 PROCEDURE — 83036 HEMOGLOBIN GLYCOSYLATED A1C: CPT

## 2023-09-22 RX ORDER — FUROSEMIDE 10 MG/ML
40 INJECTION INTRAMUSCULAR; INTRAVENOUS ONCE
Status: DISCONTINUED | OUTPATIENT
Start: 2023-09-22 | End: 2023-09-28 | Stop reason: HOSPADM

## 2023-09-22 RX ORDER — HYDROCODONE BITARTRATE AND ACETAMINOPHEN 5; 325 MG/1; MG/1
1 TABLET ORAL EVERY 6 HOURS PRN
Status: DISCONTINUED | OUTPATIENT
Start: 2023-09-22 | End: 2023-09-28 | Stop reason: HOSPADM

## 2023-09-22 RX ADMIN — GABAPENTIN 600 MG: 300 CAPSULE ORAL at 09:16

## 2023-09-22 RX ADMIN — LEVOTHYROXINE SODIUM 50 MCG: 0.05 TABLET ORAL at 07:33

## 2023-09-22 RX ADMIN — GLIPIZIDE 5 MG: 5 TABLET ORAL at 09:17

## 2023-09-22 RX ADMIN — MOMETASONE FUROATE AND FORMOTEROL FUMARATE DIHYDRATE 2 PUFF: 200; 5 AEROSOL RESPIRATORY (INHALATION) at 08:04

## 2023-09-22 RX ADMIN — MICONAZOLE NITRATE: 2 POWDER TOPICAL at 20:41

## 2023-09-22 RX ADMIN — FUROSEMIDE 20 MG: 20 TABLET ORAL at 09:17

## 2023-09-22 RX ADMIN — SODIUM CHLORIDE 3000 MG: 900 INJECTION INTRAVENOUS at 23:41

## 2023-09-22 RX ADMIN — VANCOMYCIN HYDROCHLORIDE 1000 MG: 1 INJECTION, POWDER, LYOPHILIZED, FOR SOLUTION INTRAVENOUS at 17:01

## 2023-09-22 RX ADMIN — LATANOPROST 1 DROP: 50 SOLUTION OPHTHALMIC at 20:40

## 2023-09-22 RX ADMIN — IPRATROPIUM BROMIDE AND ALBUTEROL SULFATE 1 DOSE: 2.5; .5 SOLUTION RESPIRATORY (INHALATION) at 08:02

## 2023-09-22 RX ADMIN — LISINOPRIL 10 MG: 10 TABLET ORAL at 09:17

## 2023-09-22 RX ADMIN — IOPAMIDOL 75 ML: 755 INJECTION, SOLUTION INTRAVENOUS at 16:17

## 2023-09-22 RX ADMIN — MOMETASONE FUROATE AND FORMOTEROL FUMARATE DIHYDRATE 2 PUFF: 200; 5 AEROSOL RESPIRATORY (INHALATION) at 19:47

## 2023-09-22 RX ADMIN — VANCOMYCIN HYDROCHLORIDE 1000 MG: 1 INJECTION, POWDER, LYOPHILIZED, FOR SOLUTION INTRAVENOUS at 01:33

## 2023-09-22 RX ADMIN — IPRATROPIUM BROMIDE AND ALBUTEROL SULFATE 1 DOSE: 2.5; .5 SOLUTION RESPIRATORY (INHALATION) at 12:11

## 2023-09-22 RX ADMIN — EMPAGLIFLOZIN 25 MG: 25 TABLET, FILM COATED ORAL at 09:17

## 2023-09-22 RX ADMIN — IPRATROPIUM BROMIDE AND ALBUTEROL SULFATE 1 DOSE: 2.5; .5 SOLUTION RESPIRATORY (INHALATION) at 19:47

## 2023-09-22 RX ADMIN — MICONAZOLE NITRATE: 2 POWDER TOPICAL at 11:06

## 2023-09-22 RX ADMIN — APIXABAN 5 MG: 5 TABLET, FILM COATED ORAL at 20:40

## 2023-09-22 RX ADMIN — CEFEPIME 2000 MG: 2 INJECTION, POWDER, FOR SOLUTION INTRAVENOUS at 01:31

## 2023-09-22 RX ADMIN — CEFEPIME 2000 MG: 2 INJECTION, POWDER, FOR SOLUTION INTRAVENOUS at 13:24

## 2023-09-22 RX ADMIN — HYDROCODONE BITARTRATE AND ACETAMINOPHEN 1 TABLET: 5; 325 TABLET ORAL at 11:05

## 2023-09-22 RX ADMIN — APIXABAN 5 MG: 5 TABLET, FILM COATED ORAL at 09:17

## 2023-09-22 RX ADMIN — GABAPENTIN 600 MG: 300 CAPSULE ORAL at 20:40

## 2023-09-22 RX ADMIN — ATORVASTATIN CALCIUM 40 MG: 40 TABLET, FILM COATED ORAL at 20:40

## 2023-09-22 RX ADMIN — MICONAZOLE NITRATE: 2 POWDER TOPICAL at 07:33

## 2023-09-22 RX ADMIN — Medication 10 ML: at 20:42

## 2023-09-22 ASSESSMENT — PAIN DESCRIPTION - LOCATION: LOCATION: ARM;FOOT;LEG

## 2023-09-22 ASSESSMENT — PAIN DESCRIPTION - ORIENTATION: ORIENTATION: RIGHT;LEFT

## 2023-09-22 ASSESSMENT — PAIN DESCRIPTION - DESCRIPTORS: DESCRIPTORS: ACHING;DISCOMFORT

## 2023-09-22 ASSESSMENT — PAIN SCALES - GENERAL: PAINLEVEL_OUTOF10: 7

## 2023-09-22 NOTE — PROGRESS NOTES
**OR** acetaminophen, glucose, dextrose bolus **OR** dextrose bolus, glucagon (rDNA), dextrose    Imaging:   XR CHEST (2 VW)   Final Result   No acute process. XR TIBIA FIBULA RIGHT (2 VIEWS)   Final Result   Questionable pattern of soft tissue swelling of the right leg. There is no   evidence of osteomyelitis. VL Extremity Venous Right   Final Result          All pertinent images and reports for the current Hospitalization were reviewed by me. 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 Cellulitis of right lower extremity  L03.115       2. Urinary tract infection without hematuria, site unspecified  N39.0          Rt leg extensive cellulitis  WBC elevation   Rt leg edema  Urinary symptoms concern for UTI  UA abnormal   Urine cx in process  Obesity BMI at  44  CHF  DM  LISA  Rt leg injury with scab formation portal of entry  Blood cx in process  Rt leg venous duplex -ve  On chronic anticoagulation     Given the on going pain and swelling will check CT of the Rt leg to r/o abscess d/w pt  needs ace wraps and elevation     Labs, Microbiology, Radiology and pertinent results from current hospitalization and care every where were reviewed by me as a part of the consultation.     PLAN :  Cont IV Vancomycin 1 gm Q 12 hr  Check Vancomycin level due to his size may need to up the dose   D/c IV Cefepime   Change to IV Cefazolin x 3 gm x 8 hrs  Esr, crp nOTED   Procal  MRSA probe  Urine cx Mixed aliza   Rt leg ace wraps and elevation   Iwill get CT of the Leg to r/o any abscess   HbA1C 5.3  recently on 9/19    Discussed with patient/Family and Nursing     Medical Decision Making: The following items were considered in medical decision making:  Discussion of patient care with other providers  Reviewed clinical lab tests  Reviewed radiology tests  Reviewed other diagnostic tests/interventions  Independent review of radiologic images  Independent review of  Microbiology cultures and other micro tests reviewed     Risk of Complications/Morbidity: High      Illness(es)/ Infection present that pose threat to bodily function. There is potential for severe exacerbation of infection/side effects of treatment. Therapy requires intensive monitoring for antimicrobial agent toxicity. Thanks for allowing me to participate in your patient's care please call me with any questions or concerns.     Dr. Morgan Patel MD  [address] [facility] Physician  Phone: [phone]   Fax : [phone]

## 2023-09-22 NOTE — CARE COORDINATION
9/22 Patient not in room at time of assessment. Electronically signed by Flako Rossi RN on 9/22/2023 at 3:41 PM

## 2023-09-22 NOTE — PROGRESS NOTES
Physical Therapy  Treatment Attempt    23    Name: Salvatore Álvarez   : 1955    MRN: [id number]    Patient unable to be seen by PT as RN reports low BP at this time - 94/55 taken @ 1257. Will monitor and check back this date as schedule allows.     Electronically signed by Rina Mota PT on 2023 at 1:16 PM

## 2023-09-23 LAB
ALBUMIN SERPL-MCNC: 3.3 G/DL (ref 3.4–5)
ALBUMIN/GLOB SERPL: 1 {RATIO} (ref 1.1–2.2)
ALP SERPL-CCNC: 91 U/L (ref 40–129)
ALT SERPL-CCNC: 10 U/L (ref 10–40)
ANION GAP SERPL CALCULATED.3IONS-SCNC: 9 MMOL/L (ref 3–16)
AST SERPL-CCNC: 12 U/L (ref 15–37)
BASOPHILS # BLD: 0 K/UL (ref 0–0.2)
BASOPHILS NFR BLD: 0.7 %
BILIRUB SERPL-MCNC: 1 MG/DL (ref 0–1)
BUN SERPL-MCNC: 10 MG/DL (ref 7–20)
CALCIUM SERPL-MCNC: 8.9 MG/DL (ref 8.3–10.6)
CHLORIDE SERPL-SCNC: 103 MMOL/L (ref 99–110)
CO2 SERPL-SCNC: 27 MMOL/L (ref 21–32)
CREAT SERPL-MCNC: 0.8 MG/DL (ref 0.8–1.3)
CRP SERPL-MCNC: 134.5 MG/L (ref 0–5.1)
DEPRECATED RDW RBC AUTO: 16.2 % (ref 12.4–15.4)
EKG ATRIAL RATE: 80 BPM
EKG DIAGNOSIS: NORMAL
EKG P AXIS: 73 DEGREES
EKG P-R INTERVAL: 204 MS
EKG Q-T INTERVAL: 376 MS
EKG QRS DURATION: 78 MS
EKG QTC CALCULATION (BAZETT): 433 MS
EKG R AXIS: 17 DEGREES
EKG T AXIS: 45 DEGREES
EKG VENTRICULAR RATE: 80 BPM
EOSINOPHIL # BLD: 0.2 K/UL (ref 0–0.6)
EOSINOPHIL NFR BLD: 3.4 %
GFR SERPLBLD CREATININE-BSD FMLA CKD-EPI: >60 ML/MIN/{1.73_M2}
GLUCOSE BLD-MCNC: 128 MG/DL (ref 70–99)
GLUCOSE BLD-MCNC: 129 MG/DL (ref 70–99)
GLUCOSE BLD-MCNC: 140 MG/DL (ref 70–99)
GLUCOSE BLD-MCNC: 147 MG/DL (ref 70–99)
GLUCOSE SERPL-MCNC: 126 MG/DL (ref 70–99)
HCT VFR BLD AUTO: 46.5 % (ref 40.5–52.5)
HGB BLD-MCNC: 15.7 G/DL (ref 13.5–17.5)
LYMPHOCYTES # BLD: 1.5 K/UL (ref 1–5.1)
LYMPHOCYTES NFR BLD: 21.8 %
MCH RBC QN AUTO: 29.2 PG (ref 26–34)
MCHC RBC AUTO-ENTMCNC: 33.8 G/DL (ref 31–36)
MCV RBC AUTO: 86.6 FL (ref 80–100)
MONOCYTES # BLD: 1.1 K/UL (ref 0–1.3)
MONOCYTES NFR BLD: 15.5 %
NEUTROPHILS # BLD: 4 K/UL (ref 1.7–7.7)
NEUTROPHILS NFR BLD: 58.6 %
PERFORMED ON: ABNORMAL
PLATELET # BLD AUTO: 170 K/UL (ref 135–450)
PMV BLD AUTO: 8.7 FL (ref 5–10.5)
POTASSIUM SERPL-SCNC: 3.7 MMOL/L (ref 3.5–5.1)
PROT SERPL-MCNC: 6.5 G/DL (ref 6.4–8.2)
RBC # BLD AUTO: 5.36 M/UL (ref 4.2–5.9)
SODIUM SERPL-SCNC: 139 MMOL/L (ref 136–145)
VANCOMYCIN SERPL-MCNC: 10.3 UG/ML
WBC # BLD AUTO: 6.8 K/UL (ref 4–11)

## 2023-09-23 PROCEDURE — 80053 COMPREHEN METABOLIC PANEL: CPT

## 2023-09-23 PROCEDURE — 2500000003 HC RX 250 WO HCPCS: Performed by: REGISTERED NURSE

## 2023-09-23 PROCEDURE — 93010 ELECTROCARDIOGRAM REPORT: CPT | Performed by: INTERNAL MEDICINE

## 2023-09-23 PROCEDURE — 1200000000 HC SEMI PRIVATE

## 2023-09-23 PROCEDURE — 2700000000 HC OXYGEN THERAPY PER DAY

## 2023-09-23 PROCEDURE — 36415 COLL VENOUS BLD VENIPUNCTURE: CPT

## 2023-09-23 PROCEDURE — 6370000000 HC RX 637 (ALT 250 FOR IP): Performed by: INTERNAL MEDICINE

## 2023-09-23 PROCEDURE — 6360000002 HC RX W HCPCS: Performed by: INTERNAL MEDICINE

## 2023-09-23 PROCEDURE — 2580000003 HC RX 258: Performed by: INTERNAL MEDICINE

## 2023-09-23 PROCEDURE — 94761 N-INVAS EAR/PLS OXIMETRY MLT: CPT

## 2023-09-23 PROCEDURE — 86140 C-REACTIVE PROTEIN: CPT

## 2023-09-23 PROCEDURE — 99232 SBSQ HOSP IP/OBS MODERATE 35: CPT | Performed by: INTERNAL MEDICINE

## 2023-09-23 PROCEDURE — 85025 COMPLETE CBC W/AUTO DIFF WBC: CPT

## 2023-09-23 PROCEDURE — 80202 ASSAY OF VANCOMYCIN: CPT

## 2023-09-23 PROCEDURE — 94640 AIRWAY INHALATION TREATMENT: CPT

## 2023-09-23 RX ORDER — VANCOMYCIN 1.75 G/350ML
1250 INJECTION, SOLUTION INTRAVENOUS EVERY 12 HOURS
Status: DISCONTINUED | OUTPATIENT
Start: 2023-09-23 | End: 2023-09-24

## 2023-09-23 RX ADMIN — VANCOMYCIN 1250 MG: 1.75 INJECTION, SOLUTION INTRAVENOUS at 14:21

## 2023-09-23 RX ADMIN — HYDROCODONE BITARTRATE AND ACETAMINOPHEN 1 TABLET: 5; 325 TABLET ORAL at 06:14

## 2023-09-23 RX ADMIN — VANCOMYCIN HYDROCHLORIDE 1000 MG: 1 INJECTION, POWDER, LYOPHILIZED, FOR SOLUTION INTRAVENOUS at 00:24

## 2023-09-23 RX ADMIN — HYDROCODONE BITARTRATE AND ACETAMINOPHEN 1 TABLET: 5; 325 TABLET ORAL at 14:21

## 2023-09-23 RX ADMIN — FUROSEMIDE 20 MG: 20 TABLET ORAL at 09:46

## 2023-09-23 RX ADMIN — Medication 10 ML: at 21:19

## 2023-09-23 RX ADMIN — ACETAMINOPHEN 650 MG: 325 TABLET ORAL at 21:16

## 2023-09-23 RX ADMIN — GABAPENTIN 600 MG: 300 CAPSULE ORAL at 09:40

## 2023-09-23 RX ADMIN — LEVOTHYROXINE SODIUM 50 MCG: 0.05 TABLET ORAL at 06:11

## 2023-09-23 RX ADMIN — APIXABAN 5 MG: 5 TABLET, FILM COATED ORAL at 09:41

## 2023-09-23 RX ADMIN — MICONAZOLE NITRATE: 2 POWDER TOPICAL at 22:52

## 2023-09-23 RX ADMIN — GABAPENTIN 600 MG: 300 CAPSULE ORAL at 21:17

## 2023-09-23 RX ADMIN — SODIUM CHLORIDE 3000 MG: 900 INJECTION INTRAVENOUS at 09:53

## 2023-09-23 RX ADMIN — SODIUM CHLORIDE 3000 MG: 900 INJECTION INTRAVENOUS at 18:11

## 2023-09-23 RX ADMIN — SODIUM CHLORIDE 3000 MG: 900 INJECTION INTRAVENOUS at 23:58

## 2023-09-23 RX ADMIN — LATANOPROST 1 DROP: 50 SOLUTION OPHTHALMIC at 21:18

## 2023-09-23 RX ADMIN — IPRATROPIUM BROMIDE AND ALBUTEROL SULFATE 1 DOSE: 2.5; .5 SOLUTION RESPIRATORY (INHALATION) at 09:48

## 2023-09-23 RX ADMIN — EMPAGLIFLOZIN 25 MG: 25 TABLET, FILM COATED ORAL at 09:47

## 2023-09-23 RX ADMIN — APIXABAN 5 MG: 5 TABLET, FILM COATED ORAL at 21:16

## 2023-09-23 RX ADMIN — METHOCARBAMOL TABLETS 750 MG: 750 TABLET, COATED ORAL at 14:24

## 2023-09-23 RX ADMIN — ATORVASTATIN CALCIUM 40 MG: 40 TABLET, FILM COATED ORAL at 21:18

## 2023-09-23 RX ADMIN — MOMETASONE FUROATE AND FORMOTEROL FUMARATE DIHYDRATE 2 PUFF: 200; 5 AEROSOL RESPIRATORY (INHALATION) at 09:50

## 2023-09-23 RX ADMIN — LISINOPRIL 10 MG: 10 TABLET ORAL at 09:40

## 2023-09-23 RX ADMIN — Medication 4.5 MG: at 21:17

## 2023-09-23 RX ADMIN — MICONAZOLE NITRATE: 2 POWDER TOPICAL at 10:45

## 2023-09-23 ASSESSMENT — PAIN DESCRIPTION - ORIENTATION
ORIENTATION: LEFT;RIGHT
ORIENTATION: RIGHT;LEFT
ORIENTATION: RIGHT;LEFT

## 2023-09-23 ASSESSMENT — PAIN DESCRIPTION - LOCATION
LOCATION: LEG
LOCATION: LEG;FOOT
LOCATION: FOOT;LEG

## 2023-09-23 ASSESSMENT — PAIN SCALES - GENERAL
PAINLEVEL_OUTOF10: 3
PAINLEVEL_OUTOF10: 7
PAINLEVEL_OUTOF10: 7

## 2023-09-23 ASSESSMENT — PAIN DESCRIPTION - DESCRIPTORS
DESCRIPTORS: ACHING
DESCRIPTORS: ACHING
DESCRIPTORS: SHARP

## 2023-09-23 NOTE — PROGRESS NOTES
Infectious Diseases Inpatient Progress Note      CHIEF COMPLAINT:     WBC elevation  Rt leg cellulitis  CHF  LISA  DM  Crp ELEVATION        HISTORY OF PRESENT ILLNESS:  70 y.o.  man with DM.,  Congestive heart failure, hypertension, obstructive sleep apnea, morbid obesity BMI 44 admitted to hospital secondary to fever and right leg pain swelling redness. Patient sustained injury to the right leg on the lateral aspect of the LEG Associated with cellulitis extending up to the knee AND  has a Scab. He has severe throbbing pain and presented to hospital for further evaluation. Labs significant 0.9 WBC 17 with left shift hemoglobin 18.2, blood culture in process. He was also c/o some increased urinary frequency with  some burning with urination  Location :Rt leg pain swelling    Quality :   Aching throbbing  Severity : 10 out of 10  Duration : 3 days  Timing : Constant   context : Trauma to the right leg  Modifying factors : None  Associated signs and symptoms: Local skin changes, swelling, redness, pain, low-grade fever      Interval History : Rt leg pain and swelling local redness some improvement and CT no abscess tolerating ace wraps     Past Medical History:    Past Medical History:   Diagnosis Date    Chronic diastolic CHF (congestive heart failure) (HCC)     DM (diabetes mellitus), type 2 (HCC)     HTN (hypertension)     Sleep apnea        Past Surgical History:    No past surgical history on file. Current Medications:    Outpatient Medications Marked as Taking for the 9/21/23 encounter [facility] Encounter)   Medication Sig Dispense Refill    levothyroxine (SYNTHROID) 50 MCG tablet Take 1 tablet by mouth Daily         Allergies:  Patient has no known allergies.     Immunizations :   Immunization History   Administered Date(s) Administered    COVID-19, PFIZER PURPLE top, DILUTE for use, (age 15 y+), 30mcg/0.3mL 04/21/2021, 05/12/2021    Influenza, AFLURIA (age 1 yrs+), FLUZONE, (age 10 mo+), MDV, 0.5mL Rt leg to r/o abscess and its Negative   d/w pt  needs ace wraps and elevation     Labs, Microbiology, Radiology and pertinent results from current hospitalization and care every where were reviewed by me as a part of the consultation. PLAN :  Cont IV Vancomycin 1250 mg Q 12 hr  Cont  IV Cefazolin x 3 gm x 8 hrs  Esr, crp nOTED   Procal  MRSA probe -ve  Urine cx Mixed aliza   Rt leg ace wraps and elevation   CT of the Leg NO  abscess   HbA1C 5.3  recently on 9/19    Discussed with patient/Family and Nursing     Medical Decision Making: The following items were considered in medical decision making:  Discussion of patient care with other providers  Reviewed clinical lab tests  Reviewed radiology tests  Reviewed other diagnostic tests/interventions  Independent review of radiologic images  Independent review of  Microbiology cultures and other micro tests reviewed     Risk of Complications/Morbidity: High      Illness(es)/ Infection present that pose threat to bodily function. There is potential for severe exacerbation of infection/side effects of treatment. Therapy requires intensive monitoring for antimicrobial agent toxicity. Thanks for allowing me to participate in your patient's care please call me with any questions or concerns.     Dr. Tree Rogel MD  [facility] Physician  Phone: [phone]   Fax : [phone]

## 2023-09-23 NOTE — PROGRESS NOTES
Patient on bed, denies any pain. With oxygen support at 3LPM via NC. Not in distress. Fall preventive measures promoted at all times. Assessed BLE.        Electronically signed by Remy Patel RN on 9/22/2023 at 10:59 PM

## 2023-09-23 NOTE — PROGRESS NOTES
Hospitalist managing     Multiple medical conditions with risk of decompensation. All pertinent information and plan of care discussed with Cardiologist.    Allergies:  No Known Allergies    Home Meds:  Prior to Visit Medications    Medication Sig Taking? Authorizing Provider   levothyroxine (SYNTHROID) 50 MCG tablet Take 1 tablet by mouth Daily Yes Historical Provider, MD   furosemide (LASIX) 20 MG tablet Take 1 tablet by mouth daily  DietBetter PA   nystatin (MYCOSTATIN) POWD powder Apply 1 each topically every 6 hours as needed (Rash) To groin for yeast  [employer] PA   hydrOXYzine HCl (ATARAX) 25 MG tablet Take 1 tablet by mouth 4 times daily as needed for Itching  [facility] PA   gabapentin (NEURONTIN) 300 MG capsule Take 2 capsules by mouth 2 times daily for 30 days.  Intended supply: 30 days  [employer] PA   albuterol sulfate (PROAIR RESPICLICK) 994 (90 Base) MCG/ACT aerosol powder inhalation Inhale 2 puffs into the lungs every 4 hours as needed for Wheezing or Shortness of Breath  Historical Provider, MD   albuterol (PROVENTIL) (2.5 MG/3ML) 0.083% nebulizer solution Take 3 mLs by nebulization every 4 hours as needed for Wheezing  Historical Provider, MD   apixaban (ELIQUIS) 5 MG TABS tablet Take 1 tablet by mouth 2 times daily  Historical Provider, MD   atorvastatin (LIPITOR) 40 MG tablet Take 1 tablet by mouth daily At bedtime  Historical Provider, MD   glipiZIDE (GLUCOTROL) 5 MG tablet Take 1 tablet by mouth daily  Historical Provider, MD   loperamide (IMODIUM) 1 MG/7.5ML LIQD solution Take 30 mLs by mouth every 6 hours as needed for Diarrhea (15ml)  Historical Provider, MD   empagliflozin (JARDIANCE) 25 MG tablet Take 1 tablet by mouth daily  Historical Provider, MD   latanoprost (XALATAN) 0.005 % ophthalmic solution Place 1 drop into both eyes nightly  Historical Provider, MD   lisinopril (PRINIVIL;ZESTRIL) 10 MG tablet Take 1 tablet by mouth daily  Historical Provider, MD acetaminophen, glucose, dextrose bolus **OR** dextrose bolus, glucagon (rDNA), dextrose     Past Medical History:  Past Medical History:   Diagnosis Date    Chronic diastolic CHF (congestive heart failure) (HCC)     DM (diabetes mellitus), type 2 (HCC)     HTN (hypertension)     Sleep apnea         Past Surgical History:    has no past surgical history on file. Social History:  Reviewed. reports that he has quit smoking. His smoking use included cigarettes. He has never used smokeless tobacco. He reports that he does not drink alcohol and does not use drugs. Family History:  Reviewed. family history is not on file. Denies family history of sudden cardiac death, arrhythmia, premature CAD    Review of Systems:  Constitutional: Negative for fever, night sweats, chills, weight changes, or weakness  Skin: Negative for rash, dry skin, pruritus, bruising, bleeding, blood clots, or changes in skin pigment  HEENT: Negative for vision changes, ringing in the ears, sore throat, dysphagia, or swollen lymph nodes  Respiratory: Reviewed in HPI  Cardiovascular: Reviewed in HPI  Gastrointestinal: Negative for abdominal pain, N/V/D, constipation, or black/tarry stools  Genito-Urinary: Negative for dysuria, incontinence, urgency, or hematuria  Musculoskeletal: Negative for joint swelling, muscle pain, or injuries  Neurological/Psych: Negative for confusion, seizures, headaches, balance issues or TIA-like symptoms.  No anxiety, depression, or insomnia    Physical Examination:  Vitals:    09/23/23 0952   BP:    Pulse: 65   Resp: 18   Temp:    SpO2: 97%      In: 1000 [P.O.:1000]  Out: -    Wt Readings from Last 3 Encounters:   09/23/23 (!) 320 lb 12.3 oz (145.5 kg)   09/19/23 (!) 318 lb (144.2 kg)   01/02/23 (!) 346 lb (156.9 kg)       Intake/Output Summary (Last 24 hours) at 9/23/2023 1323  Last data filed at 9/23/2023 0813  Gross per 24 hour   Intake 1000 ml   Output --   Net 1000 ml       Telemetry: Pt not on telemetry

## 2023-09-24 LAB
GLUCOSE BLD-MCNC: 114 MG/DL (ref 70–99)
GLUCOSE BLD-MCNC: 129 MG/DL (ref 70–99)
GLUCOSE BLD-MCNC: 87 MG/DL (ref 70–99)
GLUCOSE BLD-MCNC: 87 MG/DL (ref 70–99)
NT-PROBNP SERPL-MCNC: 368 PG/ML (ref 0–124)
PERFORMED ON: ABNORMAL
PERFORMED ON: ABNORMAL
PERFORMED ON: NORMAL
PERFORMED ON: NORMAL
VANCOMYCIN SERPL-MCNC: 18.4 UG/ML

## 2023-09-24 PROCEDURE — 36415 COLL VENOUS BLD VENIPUNCTURE: CPT

## 2023-09-24 PROCEDURE — 6370000000 HC RX 637 (ALT 250 FOR IP): Performed by: INTERNAL MEDICINE

## 2023-09-24 PROCEDURE — 99233 SBSQ HOSP IP/OBS HIGH 50: CPT | Performed by: INTERNAL MEDICINE

## 2023-09-24 PROCEDURE — 6360000002 HC RX W HCPCS: Performed by: INTERNAL MEDICINE

## 2023-09-24 PROCEDURE — 36569 INSJ PICC 5 YR+ W/O IMAGING: CPT

## 2023-09-24 PROCEDURE — 94760 N-INVAS EAR/PLS OXIMETRY 1: CPT

## 2023-09-24 PROCEDURE — 2580000003 HC RX 258: Performed by: INTERNAL MEDICINE

## 2023-09-24 PROCEDURE — 94640 AIRWAY INHALATION TREATMENT: CPT

## 2023-09-24 PROCEDURE — 80202 ASSAY OF VANCOMYCIN: CPT

## 2023-09-24 PROCEDURE — 1200000000 HC SEMI PRIVATE

## 2023-09-24 PROCEDURE — 83880 ASSAY OF NATRIURETIC PEPTIDE: CPT

## 2023-09-24 PROCEDURE — C1751 CATH, INF, PER/CENT/MIDLINE: HCPCS

## 2023-09-24 RX ORDER — NICOTINE 21 MG/24HR
1 PATCH, TRANSDERMAL 24 HOURS TRANSDERMAL DAILY
Status: DISCONTINUED | OUTPATIENT
Start: 2023-09-24 | End: 2023-09-28 | Stop reason: HOSPADM

## 2023-09-24 RX ORDER — SODIUM CHLORIDE 9 MG/ML
25 INJECTION, SOLUTION INTRAVENOUS PRN
Status: DISCONTINUED | OUTPATIENT
Start: 2023-09-24 | End: 2023-09-28 | Stop reason: HOSPADM

## 2023-09-24 RX ORDER — SODIUM CHLORIDE 0.9 % (FLUSH) 0.9 %
5-40 SYRINGE (ML) INJECTION EVERY 12 HOURS SCHEDULED
Status: DISCONTINUED | OUTPATIENT
Start: 2023-09-24 | End: 2023-09-28 | Stop reason: HOSPADM

## 2023-09-24 RX ORDER — LINEZOLID 600 MG/1
600 TABLET, FILM COATED ORAL EVERY 12 HOURS SCHEDULED
Status: DISCONTINUED | OUTPATIENT
Start: 2023-09-24 | End: 2023-09-27

## 2023-09-24 RX ORDER — SODIUM CHLORIDE 0.9 % (FLUSH) 0.9 %
5-40 SYRINGE (ML) INJECTION PRN
Status: DISCONTINUED | OUTPATIENT
Start: 2023-09-24 | End: 2023-09-28 | Stop reason: HOSPADM

## 2023-09-24 RX ORDER — LIDOCAINE HYDROCHLORIDE 10 MG/ML
5 INJECTION, SOLUTION EPIDURAL; INFILTRATION; INTRACAUDAL; PERINEURAL ONCE
Status: DISCONTINUED | OUTPATIENT
Start: 2023-09-24 | End: 2023-09-28 | Stop reason: HOSPADM

## 2023-09-24 RX ADMIN — HYDROCODONE BITARTRATE AND ACETAMINOPHEN 1 TABLET: 5; 325 TABLET ORAL at 20:21

## 2023-09-24 RX ADMIN — VANCOMYCIN 1250 MG: 1.75 INJECTION, SOLUTION INTRAVENOUS at 01:02

## 2023-09-24 RX ADMIN — LISINOPRIL 10 MG: 10 TABLET ORAL at 09:45

## 2023-09-24 RX ADMIN — SODIUM CHLORIDE 3000 MG: 900 INJECTION INTRAVENOUS at 10:00

## 2023-09-24 RX ADMIN — LEVOTHYROXINE SODIUM 50 MCG: 0.05 TABLET ORAL at 06:33

## 2023-09-24 RX ADMIN — ATORVASTATIN CALCIUM 40 MG: 40 TABLET, FILM COATED ORAL at 20:09

## 2023-09-24 RX ADMIN — EMPAGLIFLOZIN 25 MG: 25 TABLET, FILM COATED ORAL at 09:46

## 2023-09-24 RX ADMIN — METHOCARBAMOL TABLETS 750 MG: 750 TABLET, COATED ORAL at 15:06

## 2023-09-24 RX ADMIN — GABAPENTIN 600 MG: 300 CAPSULE ORAL at 20:08

## 2023-09-24 RX ADMIN — MICONAZOLE NITRATE: 2 POWDER TOPICAL at 20:12

## 2023-09-24 RX ADMIN — HYDROCODONE BITARTRATE AND ACETAMINOPHEN 1 TABLET: 5; 325 TABLET ORAL at 01:06

## 2023-09-24 RX ADMIN — LATANOPROST 1 DROP: 50 SOLUTION OPHTHALMIC at 21:21

## 2023-09-24 RX ADMIN — APIXABAN 5 MG: 5 TABLET, FILM COATED ORAL at 20:09

## 2023-09-24 RX ADMIN — LINEZOLID 600 MG: 600 TABLET, FILM COATED ORAL at 20:09

## 2023-09-24 RX ADMIN — MOMETASONE FUROATE AND FORMOTEROL FUMARATE DIHYDRATE 2 PUFF: 200; 5 AEROSOL RESPIRATORY (INHALATION) at 08:01

## 2023-09-24 RX ADMIN — Medication 10 ML: at 20:10

## 2023-09-24 RX ADMIN — Medication 4.5 MG: at 20:14

## 2023-09-24 RX ADMIN — MICONAZOLE NITRATE: 2 POWDER TOPICAL at 09:47

## 2023-09-24 RX ADMIN — HYDROCODONE BITARTRATE AND ACETAMINOPHEN 1 TABLET: 5; 325 TABLET ORAL at 10:04

## 2023-09-24 RX ADMIN — APIXABAN 5 MG: 5 TABLET, FILM COATED ORAL at 09:45

## 2023-09-24 RX ADMIN — MOMETASONE FUROATE AND FORMOTEROL FUMARATE DIHYDRATE 2 PUFF: 200; 5 AEROSOL RESPIRATORY (INHALATION) at 20:59

## 2023-09-24 RX ADMIN — GABAPENTIN 600 MG: 300 CAPSULE ORAL at 09:45

## 2023-09-24 RX ADMIN — METHOCARBAMOL TABLETS 750 MG: 750 TABLET, COATED ORAL at 20:20

## 2023-09-24 ASSESSMENT — PAIN DESCRIPTION - LOCATION
LOCATION: LEG
LOCATION: LEG
LOCATION: FOOT;LEG
LOCATION: LEG

## 2023-09-24 ASSESSMENT — PAIN SCALES - GENERAL
PAINLEVEL_OUTOF10: 7
PAINLEVEL_OUTOF10: 7
PAINLEVEL_OUTOF10: 0
PAINLEVEL_OUTOF10: 7
PAINLEVEL_OUTOF10: 6
PAINLEVEL_OUTOF10: 0

## 2023-09-24 ASSESSMENT — PAIN DESCRIPTION - PAIN TYPE: TYPE: ACUTE PAIN

## 2023-09-24 ASSESSMENT — PAIN DESCRIPTION - FREQUENCY: FREQUENCY: INTERMITTENT

## 2023-09-24 ASSESSMENT — PAIN DESCRIPTION - ORIENTATION
ORIENTATION: LEFT;RIGHT
ORIENTATION: RIGHT
ORIENTATION: RIGHT;LEFT
ORIENTATION: RIGHT;LEFT

## 2023-09-24 ASSESSMENT — PAIN DESCRIPTION - DESCRIPTORS
DESCRIPTORS: ACHING
DESCRIPTORS: ACHING;DISCOMFORT
DESCRIPTORS: ACHING;DISCOMFORT
DESCRIPTORS: CRAMPING

## 2023-09-24 ASSESSMENT — PAIN DESCRIPTION - ONSET: ONSET: ON-GOING

## 2023-09-24 NOTE — PROGRESS NOTES
[facility]   Electrophysiology Progress Note     Date: 9/24/2023  Admit Date: 9/21/2023     Reason for consultation: Heart Failure    Chief Complaint:   Chief Complaint   Patient presents with    Leg Pain     PT into ED from home w/ c/o 10/10 R leg pain x3 months. Pt states that he also hit his R leg on an object 1 wk ago. Scabbed area to R leg. Redness and swelling to BLE. Pt states that he has rx prescription pain medication that he previous took, but is out at this time. Dysuria     Pt also c/o dysuria, and foul odor with urination x2 months. Pt states he has had cold chills. Pt tachycardic on triage. A/ox4. History of Present Illness: History obtained from patient and medical record. Phil Martinez is a 70 y.o. male with a past medical history of significant for diastolic heart failure, coronary artery disease, diabetes, hypertension, LISA (does not tolerate CPAP), COPD/home O22.5 Liters at night, hypothyroid, CVA, DVT/PE, atrial fib. Smokes 2 pack of cigarettes per day. 9/21 Patient presented to [facility] with cellulitis of his lower extremities. He has a history of diastolic heart failure. Recently relocated to [place] and has been out of most of his medications for the last 4 months. Previously on Lasix-recently restarted by his primary care physician    Complains of progressively worsening bilateral lower extremity edema. Right lower extremity greater than left lower extremity. Associated with pain and redness-start on antibiotics  Also complained of dysuria and started on antibiotics for a UTI     Complains of chronic shortness of breath. Attributes to his smoking and COPD history. 9/23 He tells me he has been off all medications for the last 4 months due to insurance problems. He lives at home by himself. He has never been under the care of a Cardiologist or Pulmonologist. He has a referral to Cardiology at [facility] from his PCP prior to this admission.

## 2023-09-24 NOTE — PROGRESS NOTES
Received an order for placing a Mid-line order. Order placed; Awaiting for PICC/midline team. Will continue to monitor.

## 2023-09-24 NOTE — PROGRESS NOTES
Patient loss the IV access again; Notified attending doctor and Infection Doctor of the situations about whether patient can get the MID-Line/PICC for the antibiotics or it can be switch to PO. Awaiting new orders. Will continue to monitor.

## 2023-09-24 NOTE — PROGRESS NOTES
Patient comfortably on bed, noted clean and dry dressing on Right Foot. BLE still with redness. Encouraged to call for assistance. Fall preventive measures promoted.     Electronically signed by Leilani Navarrete RN on 9/23/2023 at 10:25 PM

## 2023-09-25 PROBLEM — I48.0 PAROXYSMAL ATRIAL FIBRILLATION (HCC): Status: ACTIVE | Noted: 2023-09-25

## 2023-09-25 PROBLEM — Z72.0 TOBACCO ABUSE: Status: ACTIVE | Noted: 2023-09-25

## 2023-09-25 PROBLEM — I50.32 CHRONIC DIASTOLIC HEART FAILURE (HCC): Status: ACTIVE | Noted: 2023-09-25

## 2023-09-25 LAB
BACTERIA BLD CULT ORG #2: NORMAL
BACTERIA BLD CULT: NORMAL
GLUCOSE BLD-MCNC: 107 MG/DL (ref 70–99)
GLUCOSE BLD-MCNC: 111 MG/DL (ref 70–99)
GLUCOSE BLD-MCNC: 116 MG/DL (ref 70–99)
GLUCOSE BLD-MCNC: 124 MG/DL (ref 70–99)
GLUCOSE BLD-MCNC: 67 MG/DL (ref 70–99)
GLUCOSE BLD-MCNC: 69 MG/DL (ref 70–99)
PERFORMED ON: ABNORMAL

## 2023-09-25 PROCEDURE — 97530 THERAPEUTIC ACTIVITIES: CPT

## 2023-09-25 PROCEDURE — 94761 N-INVAS EAR/PLS OXIMETRY MLT: CPT

## 2023-09-25 PROCEDURE — 2580000003 HC RX 258: Performed by: INTERNAL MEDICINE

## 2023-09-25 PROCEDURE — 1200000000 HC SEMI PRIVATE

## 2023-09-25 PROCEDURE — 97110 THERAPEUTIC EXERCISES: CPT

## 2023-09-25 PROCEDURE — 99232 SBSQ HOSP IP/OBS MODERATE 35: CPT | Performed by: INTERNAL MEDICINE

## 2023-09-25 PROCEDURE — 6360000002 HC RX W HCPCS: Performed by: INTERNAL MEDICINE

## 2023-09-25 PROCEDURE — 02HV33Z INSERTION OF INFUSION DEVICE INTO SUPERIOR VENA CAVA, PERCUTANEOUS APPROACH: ICD-10-PCS | Performed by: INTERNAL MEDICINE

## 2023-09-25 PROCEDURE — 6370000000 HC RX 637 (ALT 250 FOR IP): Performed by: INTERNAL MEDICINE

## 2023-09-25 PROCEDURE — 2700000000 HC OXYGEN THERAPY PER DAY

## 2023-09-25 PROCEDURE — 99232 SBSQ HOSP IP/OBS MODERATE 35: CPT | Performed by: NURSE PRACTITIONER

## 2023-09-25 RX ADMIN — APIXABAN 5 MG: 5 TABLET, FILM COATED ORAL at 21:00

## 2023-09-25 RX ADMIN — LINEZOLID 600 MG: 600 TABLET, FILM COATED ORAL at 21:00

## 2023-09-25 RX ADMIN — Medication 4.5 MG: at 21:00

## 2023-09-25 RX ADMIN — HYDROCODONE BITARTRATE AND ACETAMINOPHEN 1 TABLET: 5; 325 TABLET ORAL at 07:57

## 2023-09-25 RX ADMIN — GABAPENTIN 600 MG: 300 CAPSULE ORAL at 07:47

## 2023-09-25 RX ADMIN — LINEZOLID 600 MG: 600 TABLET, FILM COATED ORAL at 07:47

## 2023-09-25 RX ADMIN — LATANOPROST 1 DROP: 50 SOLUTION OPHTHALMIC at 21:07

## 2023-09-25 RX ADMIN — GABAPENTIN 600 MG: 300 CAPSULE ORAL at 20:59

## 2023-09-25 RX ADMIN — MICONAZOLE NITRATE: 2 POWDER TOPICAL at 08:03

## 2023-09-25 RX ADMIN — SODIUM CHLORIDE 3000 MG: 900 INJECTION INTRAVENOUS at 00:06

## 2023-09-25 RX ADMIN — EMPAGLIFLOZIN 25 MG: 25 TABLET, FILM COATED ORAL at 08:49

## 2023-09-25 RX ADMIN — SODIUM CHLORIDE 3000 MG: 900 INJECTION INTRAVENOUS at 15:29

## 2023-09-25 RX ADMIN — SODIUM CHLORIDE 25 ML: 9 INJECTION, SOLUTION INTRAVENOUS at 00:05

## 2023-09-25 RX ADMIN — METHOCARBAMOL TABLETS 750 MG: 750 TABLET, COATED ORAL at 20:59

## 2023-09-25 RX ADMIN — LISINOPRIL 10 MG: 10 TABLET ORAL at 07:50

## 2023-09-25 RX ADMIN — Medication 10 ML: at 21:03

## 2023-09-25 RX ADMIN — APIXABAN 5 MG: 5 TABLET, FILM COATED ORAL at 07:47

## 2023-09-25 RX ADMIN — SODIUM CHLORIDE 3000 MG: 900 INJECTION INTRAVENOUS at 23:54

## 2023-09-25 RX ADMIN — FUROSEMIDE 20 MG: 20 TABLET ORAL at 07:51

## 2023-09-25 RX ADMIN — METHOCARBAMOL TABLETS 750 MG: 750 TABLET, COATED ORAL at 08:10

## 2023-09-25 RX ADMIN — ATORVASTATIN CALCIUM 40 MG: 40 TABLET, FILM COATED ORAL at 21:00

## 2023-09-25 RX ADMIN — MICONAZOLE NITRATE: 2 POWDER TOPICAL at 21:05

## 2023-09-25 RX ADMIN — LEVOTHYROXINE SODIUM 50 MCG: 0.05 TABLET ORAL at 06:08

## 2023-09-25 RX ADMIN — GLIPIZIDE 5 MG: 5 TABLET ORAL at 07:47

## 2023-09-25 RX ADMIN — SODIUM CHLORIDE 3000 MG: 900 INJECTION INTRAVENOUS at 07:54

## 2023-09-25 ASSESSMENT — PAIN SCALES - GENERAL
PAINLEVEL_OUTOF10: 5
PAINLEVEL_OUTOF10: 5
PAINLEVEL_OUTOF10: 0
PAINLEVEL_OUTOF10: 0

## 2023-09-25 ASSESSMENT — PAIN DESCRIPTION - LOCATION
LOCATION: LEG
LOCATION: LEG

## 2023-09-25 ASSESSMENT — PAIN DESCRIPTION - ORIENTATION
ORIENTATION: RIGHT;LEFT
ORIENTATION: RIGHT;LEFT

## 2023-09-25 NOTE — PROGRESS NOTES
Comprehensive Nutrition Assessment    Type and Reason for Visit:  Initial, Patient Education (increased protein needs, diabetes/heart healthy education)    Nutrition Recommendations/Plan:   Regular diet  Glucerna with meals  CHF and diabetes education provided at time of visit. Time Spent: 30 minutes     Malnutrition Assessment:  Malnutrition Status: At risk for malnutrition (Comment) (09/23/23 2330)    Context:          Nutrition Assessment:    Patient admitted with bilateral cellulitis. History of heart failure, DM, COPD, and HTN. Nutrition risk triggered due to weight loss prior to admission. It appears weight has fluctuated up and down possibly from comorbid conditions. Patient did report intentionally losing weight over the past several monts, used to weigh over 400 lbs. Increasing vegetables servings and closely monitoring blood sugar trends at home as helped. BG have been running low here per patient. Will modify to regular diet so patient can order more food. Patient would also like Glucerna shakes after RD offered them with meals due to increased protien needs. RD also provided myplate planner info, CHF nutrition and a Glucerna coupon for home. Patient really appreciated the information will review further. RD gave suggestions how to cut down on the sodium consumed at home. Patient likes to use Tobasco on everything due to inability to taste food well. RD suggested also using red pepper flakes on food to cut down on the Tobasco sauce along with black pepper. Patient liked this idea. Nutrition Related Findings:    labs reviewed; BG Wound Type:  (cellulitis lower extremities)       Current Nutrition Intake & Therapies:    Average Meal Intake: %  Average Supplements Intake: Unable to assess  ADULT DIET;  Regular  ADULT ORAL NUTRITION SUPPLEMENT; Breakfast, Dinner, Lunch; Diabetic Oral Supplement    Anthropometric Measures:  Height: 5' 11\" (180.3 cm)  Ideal Body Weight (IBW): 172 lbs (78

## 2023-09-25 NOTE — PROGRESS NOTES
Arrived to place midline with bedside RN Irish Salinas. Pre-procedure and timeout done with RN, discussed limitations of placement and allergies. Pt's basilic, brachial, and cephalic are all easily collapsible with no indication for a clot. Vein selected is large enough for catheter (please see image below). Pt tolerated sterile procedure well, with no difficulty accessing basilic vein, when accessed - blood was free flowing and non-pulsatile. Guidewire, introducer, and catheter went in smoothly. ML placement verification with blood return and flushes easily. OK to use ML. Post procedure - reorganized pt table, placed pt in lowest position, with call light and educated on line care. Reported off to bedside RN. If you have any questions please call number below and dispatch will direct you to the PICC RN that is on call.     [phone]

## 2023-09-25 NOTE — PROGRESS NOTES
[facility]  Hypoglycemia Event and Prevention Plan      NAME: Lyle Kirkpatrick RECORD NUMBER:  [id number]  AGE: 70 y.o. GENDER: male  : 1955  EPISODE DATE:  2023     Data     Recent Labs     23  1310 23  1729 23  2204 23  0733 23  1109 23  1128   POCGLU 114* 87 129* 111* 67* 69*       Medications  Scheduled Medications:   lidocaine 1 % injection  5 mL IntraDERmal Once    sodium chloride flush  5-40 mL IntraVENous 2 times per day    nicotine  1 patch TransDERmal Daily    linezolid  600 mg Oral 2 times per day    miconazole   Topical BID    furosemide  40 mg IntraVENous Once    ceFAZolin  3,000 mg IntraVENous Q8H    apixaban  5 mg Oral BID    atorvastatin  40 mg Oral Nightly    Dulaglutide  3 mg SubCUTAneous Weekly    empagliflozin  25 mg Oral Daily    furosemide  20 mg Oral Daily    gabapentin  600 mg Oral BID    glipiZIDE  5 mg Oral Daily    latanoprost  1 drop Both Eyes Nightly    levothyroxine  50 mcg Oral Daily    lisinopril  10 mg Oral Daily    sodium chloride flush  5-40 mL IntraVENous 2 times per day    insulin lispro  0-4 Units SubCUTAneous TID WC    insulin lispro  0-4 Units SubCUTAneous Nightly    mometasone-formoterol  2 puff Inhalation BID RT       Diet  Current diet/supplement order: ADULT DIET;  Regular  ADULT ORAL NUTRITION SUPPLEMENT; Breakfast, Dinner, Lunch; Diabetic Oral Supplement     Recorded PO: PO Meals Eaten (%): 76 - 100% last meal in flowsheets      Action        Physician Notified of event: Yes   Jina Campbell MD      Responce     Achieved POCT Blood Glucose greater than 70 mg/dl: Yes      Medication plan updated: Yes        Electronically signed by Ashly Gaines RN on 2023 at 11:45 AM

## 2023-09-25 NOTE — PROGRESS NOTES
Physician Progress Note      PATIENT:               Benjamin Benitez  CSN #:                  [id number]  :                       1955  ADMIT DATE:       2023 6:33 AM  DISCH DATE:  RESPONDING  PROVIDER #:        Alejandra Joshi MD          QUERY TEXT:    Pt admitted with RLE Cellulitis. Pt noted to have elevated WBC, HR. If   possible, please document in the progress notes and discharge summary if you   are evaluating and /or treating any of the following: The medical record reflects the following:  Risk Factors:  2/2 RLE Cellulitis DM2 morbid obesity  Clinical Indicators: WBC 17.0, HR > 90, .8, Sed rate 34  Treatment: IV Cefepime and IV Vanco, blood and urine cultures  Options provided:  -- Sepsis,2/2 RLE Cellulitis present on admission  -- Other - I will add my own diagnosis  -- Disagree - Not applicable / Not valid  -- Disagree - Clinically unable to determine / Unknown  -- Refer to Clinical Documentation Reviewer    PROVIDER RESPONSE TEXT:    This patient has sepsis, 2/2 RLE Cellulitis which was present on admission. Query created by: Stacy Guzman on 2023 3:11 PM      QUERY TEXT:    Pt admitted with 2/2 RLE cellulitis. Pt noted to have DM 2. If possible,   please document in progress notes and discharge summary the relationship, if   any, between cellulitis and DM.     The medical record reflects the following:  Risk Factors: DM2 morbid obesity  Clinical Indicators:  per H&P \"RLE cellulitis - started on cefepime and vanco.   DM II - fairly controlled, cont home meds, on SSI\"  Treatment: IV Cefepime and IV Vanco and SSI accuchecks ACHS and continue home   DM medicines  Options provided:  -- RLE cellulitis associated with Diabetes  -- RLE cellulitis unrelated to Diabetes  -- Other - I will add my own diagnosis  -- Disagree - Not applicable / Not valid  -- Disagree - Clinically unable to determine / Unknown  -- Refer to Clinical Documentation Reviewer    PROVIDER RESPONSE TEXT:    RLE

## 2023-09-25 NOTE — CARE COORDINATION
9/25 Called to patient's room by RN. Met with patient at bedside, patient requests assistance to appeal his discharge. Call placed to [facility] Medicare for patient.  Electronically signed by Mahin Prince RN on 9/25/2023 at 2:50 PM

## 2023-09-25 NOTE — PROCEDURES
Attempted to transport patient at 8:30 to the vascular lab to complete the bilateral lower extremity arterial duplex. However, patient refused at this time. Will attempt to scan patient later if schedule permits.

## 2023-09-25 NOTE — PROGRESS NOTES
Infectious Diseases Inpatient Progress Note      CHIEF COMPLAINT:     WBC elevation  Rt leg cellulitis  CHF  LISA  DM  Crp ELEVATION        HISTORY OF PRESENT ILLNESS:  70 y.o.  man with DM.,  Congestive heart failure, hypertension, obstructive sleep apnea, morbid obesity BMI 44 admitted to hospital secondary to fever and right leg pain swelling redness. Patient sustained injury to the right leg on the lateral aspect of the LEG Associated with cellulitis extending up to the knee AND  has a Scab. He has severe throbbing pain and presented to hospital for further evaluation. Labs significant 0.9 WBC 17 with left shift hemoglobin 18.2, blood culture in process. He was also c/o some increased urinary frequency with  some burning with urination  Location :Rt leg pain swelling    Quality :   Aching throbbing  Severity : 10 out of 10  Duration : 3 days  Timing : Constant   context : Trauma to the right leg  Modifying factors : None  Associated signs and symptoms: Local skin changes, swelling, redness, pain, low-grade fever      Interval History : Rt leg pain and swelling AND some improvement and PICC working ok tolerating abx ok   Past Medical History:    Past Medical History:   Diagnosis Date    Chronic diastolic CHF (congestive heart failure) (HCC)     DM (diabetes mellitus), type 2 (HCC)     HTN (hypertension)     Sleep apnea        Past Surgical History:    No past surgical history on file. Current Medications:    Outpatient Medications Marked as Taking for the 9/21/23 encounter [facility] Encounter)   Medication Sig Dispense Refill    levothyroxine (SYNTHROID) 50 MCG tablet Take 1 tablet by mouth Daily         Allergies:  Patient has no known allergies.     Immunizations :   Immunization History   Administered Date(s) Administered    COVID-19, PFIZER PURPLE top, DILUTE for use, (age 15 y+), 30mcg/0.3mL 04/21/2021, 05/12/2021    Influenza, AFLURIA (age 1 yrs+), FLUZONE, (age 10 mo+), MDV, 0.5mL 10/03/2019, will check CT of the Rt leg to r/o abscess and its Negative   d/w pt  needs ace wraps and elevation     On going pain and swelling with local redness some improvement but still very inflamed will need IV abx ok for Mid line due to poor venous access AND NOw tolerting abx ok       Once leg looks better home on oral abx     Labs, Microbiology, Radiology and pertinent results from current hospitalization and care every where were reviewed by me as a part of the consultation. PLAN :  Cont  IV Cefazolin x 3 gm x 8 hrs  Oral LINEZOLID x 600 mg q 12 hrs  Esr, crp nOTED 134  check tomorrow   Procal in AM  MRSA probe -ve  Urine cx Mixed aliza   Rt leg ace wraps and elevation   CT of the Leg NO  abscess   HbA1C 5.3  recently on 9/19  Mid line PLACED     Discussed with patient/Family and Nursing     Medical Decision Making: The following items were considered in medical decision making:  Discussion of patient care with other providers  Reviewed clinical lab tests  Reviewed radiology tests  Reviewed other diagnostic tests/interventions  Independent review of radiologic images  Independent review of  Microbiology cultures and other micro tests reviewed     Risk of Complications/Morbidity: High      Illness(es)/ Infection present that pose threat to bodily function. There is potential for severe exacerbation of infection/side effects of treatment. Therapy requires intensive monitoring for antimicrobial agent toxicity. Thanks for allowing me to participate in your patient's care please call me with any questions or concerns.     Dr. Morgan Patel MD  [address] [place] [address] Physician  Phone: [phone]   Fax : [phone]

## 2023-09-25 NOTE — CARE COORDINATION
Case Management Assessment  Initial Evaluation    Date/Time of Evaluation: 9/25/2023 2:24 PM  Assessment Completed by: Kerri Sterling RN    If patient is discharged prior to next notation, then this note serves as note for discharge by case management. Patient Name: Ismael Maldonado                     YOB: 1955  Diagnosis: Cellulitis [L03.90]  Cellulitis of right lower extremity [L03.115]  Urinary tract infection without hematuria, site unspecified [N39.0]                     Date / Time: 9/21/2023  6:33 AM    Patient Admission Status: Inpatient   Readmission Risk (Low < 19, Mod (19-27), High > 27): Readmission Risk Score: 12.6    Current PCP: MARGARITO Schaeffer  PCP verified by CM? Yes    Chart Reviewed: Yes      History Provided by: Patient  Patient Orientation: Alert and Oriented    Patient Cognition: Alert    Hospitalization in the last 30 days (Readmission):  No    If yes, Readmission Assessment in CM Navigator will be completed.     Advance Directives:      Code Status: Full Code   Patient's Primary Decision Maker is: Legal Next of Kin      Discharge Planning:    Patient lives with: (P) Alone Type of Home: (P) House (Lives in basement)  Primary Care Giver: Self  Patient Support Systems include: Friends/Neighbors   Current Financial resources: Medicaid, Medicare  Current community resources: ECF/Home Care  Current services prior to admission: (P) Oxygen Therapy (2 L/M)            Current DME:              Type of Home Care services:  (P) Nursing Services, OT, PT, [employer]    ADLS  Prior functional level: Assistance with the following:, Cooking, Housework, Mobility (electric scooter)  Current functional level: Assistance with the following:, Cooking, Housework, Shopping, Mobility    PT AM-PAC: 17 /24  OT AM-PAC: 17 /24    Family can provide assistance at DC: No  Would you like Case Management to discuss the discharge plan with any other family members/significant others, and if so, who? No  Plans to Return to Present Housing: Yes  Other Identified Issues/Barriers to RETURNING to current housing: Lives alone, LLE Cellulitis, Hx stroke with Left sided weakness  Potential Assistance needed at discharge: (P) [address] [place], Transportation            Potential DME: TBD   Patient expects to discharge to: (P) [address] [facility] [place] for transportation at discharge: (P) Other (see comment) (needs assistance with transport)    Financial    Payor: [id number] / Plan: Kandis Gallardo / Product Type: *No Product type* /     Does insurance require precert for SNF: Yes    Potential assistance Purchasing Medications: (P) No  Meds-to-Beds request:  Yes    [facility] ([address] [facility]) - [address] [facility], [address] [place] [address] [place] - P [phone] - F [phone]    Notes:    Factors facilitating achievement of predicted outcomes: Friend support and Cooperative    Barriers to discharge: Pain, Anxiety, Decreased endurance, Upper extremity weakness, Lower extremity weakness, and Wound Care    Additional Case Management Notes: Plan: Return to home with [facility], needs SN, PT, OT and Aide. May need assistance with transport to home. Oxygen dependent 2 L/M/unknown oxygen company    The Plan for Transition of Care is related to the following treatment goals of Cellulitis [L03.90]  Cellulitis of right lower extremity [L03.115]  Urinary tract infection without hematuria, site unspecified [M34.8]    IF APPLICABLE: The Patient and/or patient representative Calixto and his family were provided with a choice of provider and agrees with the discharge plan. Freedom of choice list with basic dialogue that supports the patient's individualized plan of care/goals and shares the quality data associated with the providers was provided to: (P) Patient   Patient Representative Name:       The Patient and/or Patient Representative Agree with the Discharge Plan? (P) Yes    Referral sent to [facility]. Patient has no preference.     Luis Nguyen RN  Case Management

## 2023-09-25 NOTE — PROGRESS NOTES
Cardiology - PROGRESS NOTE    Admit Date: 9/21/2023     Reason for follow up: DHF     70y.o. year old male with past medical history significant for diastolic heart failure, coronary artery disease, diabetes, hypertension, LISA (does not tolerate CPAP), COPD/home O22.5 Liters at night, hypothyroid, CVA, DVT/PE, atrial fib. Smokes 2 pack of cigarettes per day. Patient presented to [facility] with cellulitis of his lower extremities. He has a history of diastolic heart failure. Recently relocated to [place] and has been out of most of his medications for the last 4 months. Previously on Lasix-recently restarted by his primary care physician  Complains of progressively worsening bilateral lower extremity edema. Right lower extremity greater than left lower extremity. Associated with pain and redness-start on antibiotics  Also complained of dysuria and started on antibiotics for a UTI      Social History:   reports that he has quit smoking. His smoking use included cigarettes. He has never used smokeless tobacco. He reports that he does not drink alcohol and does not use drugs. Family History: family history is not on file. Interval History:   Patient seen and examined and notes reviewed   Pt sitting on side of bed   Reports his RLE is painful however LLE feels better  Scheduled for vascular studies-pt refused transport   Denies chest pain   All other systems reviewed and negative except as above. Diet: ADULT DIET; Regular  ADULT ORAL NUTRITION SUPPLEMENT; Breakfast, Dinner, Lunch; Diabetic Oral Supplement  Pain is:None  Nausea:None    No intake/output data recorded.    Patient Vitals for the past 96 hrs (Last 3 readings):   Weight   09/25/23 0412 (!) 320 lb 1.6 oz (145.2 kg)   09/24/23 0636 (!) 320 lb 12.3 oz (145.5 kg)   09/23/23 0510 (!) 320 lb 12.3 oz (145.5 kg)           Data:   Scheduled Meds:   Scheduled Meds:   lidocaine

## 2023-09-25 NOTE — PROGRESS NOTES
Physical Therapy  Facility/Department: [address] MED SURG  Physical Therapy Treatment Note    Name: Priscilla Mondragon  : 1955  MRN: [id number]  Date of Service: 2023    Discharge Recommendations:  Continue to assess pending progress, Patient would benefit from continued therapy after discharge, Home with Home health PT     Priscilla Mondragon scored a 20/24 on the AM-PAC short mobility form. Current research shows that an AM-PAC score of 18 or greater is typically associated with a discharge to the patient's home setting. Based on the patient's AM-PAC score and their current functional mobility deficits, it is recommended that the patient have 2-3 sessions per week of Physical Therapy at d/c to increase the patient's independence. At this time, this patient demonstrates the endurance and safety to discharge home with HHPT and a follow up treatment frequency of 2-3x/wk. Please see assessment section for further patient specific details. If patient discharges prior to next session this note will serve as a discharge summary. Please see below for the latest assessment towards goals. Patient Diagnosis(es): The primary encounter diagnosis was Cellulitis of right lower extremity. A diagnosis of Urinary tract infection without hematuria, site unspecified was also pertinent to this visit. Past Medical History:  has a past medical history of Chronic diastolic CHF (congestive heart failure) ([address]), DM (diabetes mellitus), type 2 ([address]), HTN (hypertension), and Sleep apnea. Past Surgical History:  has no past surgical history on file. Assessment   Body Structures, Functions, Activity Limitations Requiring Skilled Therapeutic Intervention: Decreased functional mobility ; Decreased strength;Decreased endurance;Decreased balance; Increased pain  Assessment: Patient is a 71 yo male presenting with decreased functional mobility s/p cellulitis and increased BLE pain.  PMH includes chest pain, stroke, UTI, and

## 2023-09-26 LAB
ANION GAP SERPL CALCULATED.3IONS-SCNC: 7 MMOL/L (ref 3–16)
BASOPHILS # BLD: 0.1 K/UL (ref 0–0.2)
BASOPHILS NFR BLD: 0.9 %
BUN SERPL-MCNC: 15 MG/DL (ref 7–20)
CALCIUM SERPL-MCNC: 8.9 MG/DL (ref 8.3–10.6)
CHLORIDE SERPL-SCNC: 101 MMOL/L (ref 99–110)
CO2 SERPL-SCNC: 30 MMOL/L (ref 21–32)
CREAT SERPL-MCNC: 0.9 MG/DL (ref 0.8–1.3)
CRP SERPL-MCNC: 27.1 MG/L (ref 0–5.1)
DEPRECATED RDW RBC AUTO: 15.8 % (ref 12.4–15.4)
EOSINOPHIL # BLD: 0.2 K/UL (ref 0–0.6)
EOSINOPHIL NFR BLD: 2.7 %
ERYTHROCYTE [SEDIMENTATION RATE] IN BLOOD BY WESTERGREN METHOD: 22 MM/HR (ref 0–20)
GFR SERPLBLD CREATININE-BSD FMLA CKD-EPI: >60 ML/MIN/{1.73_M2}
GLUCOSE BLD-MCNC: 101 MG/DL (ref 70–99)
GLUCOSE BLD-MCNC: 107 MG/DL (ref 70–99)
GLUCOSE BLD-MCNC: 120 MG/DL (ref 70–99)
GLUCOSE BLD-MCNC: 87 MG/DL (ref 70–99)
GLUCOSE SERPL-MCNC: 104 MG/DL (ref 70–99)
HCT VFR BLD AUTO: 48.9 % (ref 40.5–52.5)
HGB BLD-MCNC: 16.8 G/DL (ref 13.5–17.5)
LYMPHOCYTES # BLD: 1.7 K/UL (ref 1–5.1)
LYMPHOCYTES NFR BLD: 21.4 %
MCH RBC QN AUTO: 29.5 PG (ref 26–34)
MCHC RBC AUTO-ENTMCNC: 34.3 G/DL (ref 31–36)
MCV RBC AUTO: 86.1 FL (ref 80–100)
MONOCYTES # BLD: 0.8 K/UL (ref 0–1.3)
MONOCYTES NFR BLD: 9.7 %
NEUTROPHILS # BLD: 5.1 K/UL (ref 1.7–7.7)
NEUTROPHILS NFR BLD: 65.3 %
PERFORMED ON: ABNORMAL
PERFORMED ON: NORMAL
PLATELET # BLD AUTO: 204 K/UL (ref 135–450)
PMV BLD AUTO: 8.2 FL (ref 5–10.5)
POTASSIUM SERPL-SCNC: 4.4 MMOL/L (ref 3.5–5.1)
RBC # BLD AUTO: 5.68 M/UL (ref 4.2–5.9)
SODIUM SERPL-SCNC: 138 MMOL/L (ref 136–145)
WBC # BLD AUTO: 7.8 K/UL (ref 4–11)

## 2023-09-26 PROCEDURE — 6370000000 HC RX 637 (ALT 250 FOR IP): Performed by: INTERNAL MEDICINE

## 2023-09-26 PROCEDURE — 2580000003 HC RX 258: Performed by: INTERNAL MEDICINE

## 2023-09-26 PROCEDURE — 6360000002 HC RX W HCPCS: Performed by: INTERNAL MEDICINE

## 2023-09-26 PROCEDURE — 97530 THERAPEUTIC ACTIVITIES: CPT

## 2023-09-26 PROCEDURE — 36415 COLL VENOUS BLD VENIPUNCTURE: CPT

## 2023-09-26 PROCEDURE — 1200000000 HC SEMI PRIVATE

## 2023-09-26 PROCEDURE — 2700000000 HC OXYGEN THERAPY PER DAY

## 2023-09-26 PROCEDURE — 94761 N-INVAS EAR/PLS OXIMETRY MLT: CPT

## 2023-09-26 PROCEDURE — 85652 RBC SED RATE AUTOMATED: CPT

## 2023-09-26 PROCEDURE — 86140 C-REACTIVE PROTEIN: CPT

## 2023-09-26 PROCEDURE — 80048 BASIC METABOLIC PNL TOTAL CA: CPT

## 2023-09-26 PROCEDURE — 94640 AIRWAY INHALATION TREATMENT: CPT

## 2023-09-26 PROCEDURE — 99232 SBSQ HOSP IP/OBS MODERATE 35: CPT | Performed by: INTERNAL MEDICINE

## 2023-09-26 PROCEDURE — 85025 COMPLETE CBC W/AUTO DIFF WBC: CPT

## 2023-09-26 RX ADMIN — SODIUM CHLORIDE 25 ML: 9 INJECTION, SOLUTION INTRAVENOUS at 08:47

## 2023-09-26 RX ADMIN — EMPAGLIFLOZIN 25 MG: 25 TABLET, FILM COATED ORAL at 09:23

## 2023-09-26 RX ADMIN — HYDROCODONE BITARTRATE AND ACETAMINOPHEN 1 TABLET: 5; 325 TABLET ORAL at 08:37

## 2023-09-26 RX ADMIN — APIXABAN 5 MG: 5 TABLET, FILM COATED ORAL at 20:23

## 2023-09-26 RX ADMIN — METHOCARBAMOL TABLETS 750 MG: 750 TABLET, COATED ORAL at 08:37

## 2023-09-26 RX ADMIN — MOMETASONE FUROATE AND FORMOTEROL FUMARATE DIHYDRATE 2 PUFF: 200; 5 AEROSOL RESPIRATORY (INHALATION) at 08:11

## 2023-09-26 RX ADMIN — LEVOTHYROXINE SODIUM 50 MCG: 0.05 TABLET ORAL at 06:46

## 2023-09-26 RX ADMIN — FUROSEMIDE 20 MG: 20 TABLET ORAL at 08:32

## 2023-09-26 RX ADMIN — SODIUM CHLORIDE 3000 MG: 900 INJECTION INTRAVENOUS at 16:15

## 2023-09-26 RX ADMIN — HYDROCODONE BITARTRATE AND ACETAMINOPHEN 1 TABLET: 5; 325 TABLET ORAL at 20:23

## 2023-09-26 RX ADMIN — SODIUM CHLORIDE 3000 MG: 900 INJECTION INTRAVENOUS at 22:26

## 2023-09-26 RX ADMIN — Medication 4.5 MG: at 20:23

## 2023-09-26 RX ADMIN — APIXABAN 5 MG: 5 TABLET, FILM COATED ORAL at 08:32

## 2023-09-26 RX ADMIN — LISINOPRIL 10 MG: 10 TABLET ORAL at 08:32

## 2023-09-26 RX ADMIN — GABAPENTIN 600 MG: 300 CAPSULE ORAL at 20:23

## 2023-09-26 RX ADMIN — MOMETASONE FUROATE AND FORMOTEROL FUMARATE DIHYDRATE 2 PUFF: 200; 5 AEROSOL RESPIRATORY (INHALATION) at 20:20

## 2023-09-26 RX ADMIN — ATORVASTATIN CALCIUM 40 MG: 40 TABLET, FILM COATED ORAL at 20:23

## 2023-09-26 RX ADMIN — METHOCARBAMOL TABLETS 750 MG: 750 TABLET, COATED ORAL at 20:23

## 2023-09-26 RX ADMIN — GABAPENTIN 600 MG: 300 CAPSULE ORAL at 08:32

## 2023-09-26 RX ADMIN — LINEZOLID 600 MG: 600 TABLET, FILM COATED ORAL at 08:32

## 2023-09-26 RX ADMIN — LINEZOLID 600 MG: 600 TABLET, FILM COATED ORAL at 20:23

## 2023-09-26 RX ADMIN — Medication 10 ML: at 09:25

## 2023-09-26 RX ADMIN — SODIUM CHLORIDE 3000 MG: 900 INJECTION INTRAVENOUS at 08:48

## 2023-09-26 ASSESSMENT — PAIN DESCRIPTION - ORIENTATION
ORIENTATION: LEFT;RIGHT
ORIENTATION: RIGHT;LEFT
ORIENTATION: RIGHT

## 2023-09-26 ASSESSMENT — PAIN DESCRIPTION - LOCATION
LOCATION: LEG
LOCATION: LEG
LOCATION: SHOULDER;LEG

## 2023-09-26 ASSESSMENT — PAIN SCALES - GENERAL
PAINLEVEL_OUTOF10: 0
PAINLEVEL_OUTOF10: 7
PAINLEVEL_OUTOF10: 8
PAINLEVEL_OUTOF10: 7

## 2023-09-26 ASSESSMENT — PAIN SCALES - WONG BAKER: WONGBAKER_NUMERICALRESPONSE: 0

## 2023-09-26 ASSESSMENT — PAIN DESCRIPTION - DESCRIPTORS: DESCRIPTORS: ACHING

## 2023-09-26 NOTE — DISCHARGE INSTR - DIET

## 2023-09-26 NOTE — CARE COORDINATION
[facility]    Referral received from  to follow for home care services. [facility] unable to accept payor at this time, will require alternate agency, no preference, CM aware. Referral accepted by Jonathan Ortiz with [facility], will pull from [facility].      Thomas Sanchez RN, BSN CTN  [facility] [phone]

## 2023-09-26 NOTE — PROGRESS NOTES
Occupational Therapy  Facility/Department: [address] MED SURG  Occupational Therapy Daily Treatment Note  This note to serve as OT d/c summary if pt is d/c-ed from hospital prior to next OT session. Name: Darene Cooks  : 1955  MRN: [id number]  Date of Service: 2023    Discharge Recommendations:  2-3 sessions per week, Patient would benefit from continued therapy after discharge          Patient Diagnosis(es): The primary encounter diagnosis was Cellulitis of right lower extremity. A diagnosis of Urinary tract infection without hematuria, site unspecified was also pertinent to this visit. Past Medical History:  has a past medical history of Chronic diastolic CHF (congestive heart failure) ([address]), DM (diabetes mellitus), type 2 ([address]), HTN (hypertension), and Sleep apnea. Past Surgical History:  has no past surgical history on file. Treatment Diagnosis: decreased ADL status/fxl mobility/strength/ROM      Assessment   Performance deficits / Impairments: Decreased functional mobility ; Decreased strength;Decreased endurance;Decreased ADL status; Decreased balance;Decreased ROM; Decreased safe awareness  Assessment: Per ED Provider note on 23, \"Right leg swelling pain and redness he noticed this morning. Thinking about it and bit by something. Also complained of some discomfort with urination at that he may have a urinary tract infection. Denies fever. No nausea vomiting. Denies chest pain, no abdominal pain. No extremity weakness. No other complaints. He Darene Cooks is a 70 y.o. male who presents emergency room with above complaint. \" PTA, pt living alone at home and independent with ADLs and fxl mobility in home with bariatric walker and in community with electric scooter. Pt reports he is going to receive services from [facility] 2 hours/week to help with cleaning/IADLs. This date, Pt completed mobility in the room using bariatric RW with modified independence.   Pt reports has been having difficulty

## 2023-09-26 NOTE — DISCHARGE INSTRUCTIONS
Extra Heart Failure sites:     https://[employer]. [employer]/publication/?u=861485  --- this is American Heart Association Interactive Healthier Living with Heart Failure guidebook. Please click hyperlink or copy / paste link into search bar. Use your mouse to scroll through the pages. Lots of information about weight monitoring, diet tips, activity, meds, etc    HF [place] dave -- this is a free smart phone dave available for iPhone and Android download. Use your phone to track sodium / fluid intake, zone tool symptom tracking, weights, medications, etc. Click on this hyperlink  HF [place] Dave   for QR code for easy download--.look for this in your dave store                                          DASH (Dietary Approach to Stop Hypertension) diet --  [employer] -- this diet is a flexible eating plan that promotes heart healthy eating style. Click on hyperlink or copy / paste link into search bar. Lots of low sodium recipes and tips.     [employer]  -- more free recipes

## 2023-09-26 NOTE — PROGRESS NOTES
Infectious Diseases Inpatient Progress Note      CHIEF COMPLAINT:     WBC elevation  Rt leg cellulitis  CHF  LISA  DM  Crp ELEVATION        HISTORY OF PRESENT ILLNESS:  70 y.o.  man with DM.,  Congestive heart failure, hypertension, obstructive sleep apnea, morbid obesity BMI 44 admitted to hospital secondary to fever and right leg pain swelling redness. Patient sustained injury to the right leg on the lateral aspect of the LEG Associated with cellulitis extending up to the knee AND  has a Scab. He has severe throbbing pain and presented to hospital for further evaluation. Labs significant 0.9 WBC 17 with left shift hemoglobin 18.2, blood culture in process. He was also c/o some increased urinary frequency with  some burning with urination  Location :Rt leg pain swelling    Quality :   Aching throbbing  Severity : 10 out of 10  Duration : 3 days  Timing : Constant   context : Trauma to the right leg  Modifying factors : None  Associated signs and symptoms: Local skin changes, swelling, redness, pain, low-grade fever      Interval History : Rt leg pain and swelling slowly improving no chills and mid line working ok and Ace wraps helping with swelling      Past Medical History:    Past Medical History:   Diagnosis Date    Chronic diastolic CHF (congestive heart failure) (HCC)     DM (diabetes mellitus), type 2 (HCC)     HTN (hypertension)     Sleep apnea        Past Surgical History:    No past surgical history on file. Current Medications:    Outpatient Medications Marked as Taking for the 9/21/23 encounter [facility] Encounter)   Medication Sig Dispense Refill    levothyroxine (SYNTHROID) 50 MCG tablet Take 1 tablet by mouth Daily         Allergies:  Patient has no known allergies.     Immunizations :   Immunization History   Administered Date(s) Administered    COVID-19, PFIZER PURPLE top, DILUTE for use, (age 15 y+), 30mcg/0.3mL 04/21/2021, 05/12/2021    Influenza, AFLURIA (age 1 yrs+), FLUZONE, (age 10 normocephalic and atraumatic  Eyes: pupils equal, round, and reactive to light, conjunctivae normal  ENT: tympanic membrane, external ear and ear canal normal bilaterally, nose without deformity, nasal mucosa and turbinates normal without polyps  Neck: supple and non-tender without mass, no thyromegaly  no cervical lymphadenopathy  Pulmonary/Chest: clear to auscultation bilaterally- no wheezes, rales or rhonchi, normal air movement, no respiratory distress  Cardiovascular: normal rate, regular rhythm, normal S1 and S2, no murmurs, rubs, clicks, or gallops, no carotid bruits  Abdomen: soft, non-tender, non-distended, normal bowel sounds, no masses or organomegaly  Extremities: no cyanosis, clubbing or edema  Musculoskeletal: normal range of motion, no joint swelling, deformity or tenderness  Integumentary: No rashes, no abnormal skin lesions, no petechiae  Neurologic: reflexes normal and symmetric, no cranial nerve deficit  Psych:  Orientation, sensorium, mood normal   Lines: PICC   Rt leg below the knee on going  severe cellulitis with leg edema and pain +++  Scab over the Rt leg      DATA:    CBC:   Lab Results   Component Value Date    WBC 7.8 09/26/2023    HGB 16.8 09/26/2023    HCT 48.9 09/26/2023    MCV 86.1 09/26/2023     09/26/2023     RENAL:   Lab Results   Component Value Date    CREATININE 0.9 09/26/2023    BUN 15 09/26/2023     09/26/2023    K 4.4 09/26/2023     09/26/2023    CO2 30 09/26/2023     SED RATE:   Lab Results   Component Value Date/Time    SEDRATE 22 09/26/2023 05:04 AM     CK: No results found for: \"CKTOTAL\"  CRP:   Lab Results   Component Value Date/Time    CRP 27.1 09/26/2023 05:04 AM     Hepatic Function Panel:   Lab Results   Component Value Date/Time    ALKPHOS 91 09/23/2023 06:44 AM    ALT 10 09/23/2023 06:44 AM    AST 12 09/23/2023 06:44 AM    PROT 6.5 09/23/2023 06:44 AM    BILITOT 1.0 09/23/2023 06:44 AM    LABALBU 3.3 09/23/2023 06:44 AM     UA:  Lab Results

## 2023-09-26 NOTE — PROGRESS NOTES
BLE redressed per order at this time. Elevated with pillows. Pt tolerated well. IV antibiotics running as ordered. No needs stated at this time.

## 2023-09-26 NOTE — PROGRESS NOTES
Patient alert and oriented x4. VSS on room air. Wears 2L NC at night. Complains of 7/10 pain in shoulder and leg. PRN pain mediation administered. Patient set up in shower to get cleaned up independently. Patient undressed BLE dressings overnight. No other needs stated at this time.

## 2023-09-27 LAB
ANION GAP SERPL CALCULATED.3IONS-SCNC: 8 MMOL/L (ref 3–16)
BASOPHILS # BLD: 0.1 K/UL (ref 0–0.2)
BASOPHILS NFR BLD: 1 %
BUN SERPL-MCNC: 11 MG/DL (ref 7–20)
CALCIUM SERPL-MCNC: 8.9 MG/DL (ref 8.3–10.6)
CHLORIDE SERPL-SCNC: 102 MMOL/L (ref 99–110)
CO2 SERPL-SCNC: 30 MMOL/L (ref 21–32)
CREAT SERPL-MCNC: 1 MG/DL (ref 0.8–1.3)
DEPRECATED RDW RBC AUTO: 15.8 % (ref 12.4–15.4)
EOSINOPHIL # BLD: 0.2 K/UL (ref 0–0.6)
EOSINOPHIL NFR BLD: 3 %
GFR SERPLBLD CREATININE-BSD FMLA CKD-EPI: >60 ML/MIN/{1.73_M2}
GLUCOSE BLD-MCNC: 125 MG/DL (ref 70–99)
GLUCOSE BLD-MCNC: 127 MG/DL (ref 70–99)
GLUCOSE BLD-MCNC: 148 MG/DL (ref 70–99)
GLUCOSE BLD-MCNC: 98 MG/DL (ref 70–99)
GLUCOSE SERPL-MCNC: 89 MG/DL (ref 70–99)
HCT VFR BLD AUTO: 49.8 % (ref 40.5–52.5)
HGB BLD-MCNC: 16.7 G/DL (ref 13.5–17.5)
LYMPHOCYTES # BLD: 1.9 K/UL (ref 1–5.1)
LYMPHOCYTES NFR BLD: 24.8 %
MCH RBC QN AUTO: 29.3 PG (ref 26–34)
MCHC RBC AUTO-ENTMCNC: 33.6 G/DL (ref 31–36)
MCV RBC AUTO: 87.4 FL (ref 80–100)
MONOCYTES # BLD: 0.9 K/UL (ref 0–1.3)
MONOCYTES NFR BLD: 11.5 %
NEUTROPHILS # BLD: 4.7 K/UL (ref 1.7–7.7)
NEUTROPHILS NFR BLD: 59.7 %
PERFORMED ON: ABNORMAL
PERFORMED ON: NORMAL
PLATELET # BLD AUTO: 209 K/UL (ref 135–450)
PMV BLD AUTO: 7.9 FL (ref 5–10.5)
POTASSIUM SERPL-SCNC: 4.4 MMOL/L (ref 3.5–5.1)
RBC # BLD AUTO: 5.7 M/UL (ref 4.2–5.9)
SODIUM SERPL-SCNC: 140 MMOL/L (ref 136–145)
WBC # BLD AUTO: 7.8 K/UL (ref 4–11)

## 2023-09-27 PROCEDURE — 6370000000 HC RX 637 (ALT 250 FOR IP): Performed by: INTERNAL MEDICINE

## 2023-09-27 PROCEDURE — 94760 N-INVAS EAR/PLS OXIMETRY 1: CPT

## 2023-09-27 PROCEDURE — 2500000003 HC RX 250 WO HCPCS: Performed by: REGISTERED NURSE

## 2023-09-27 PROCEDURE — 2580000003 HC RX 258: Performed by: INTERNAL MEDICINE

## 2023-09-27 PROCEDURE — 36415 COLL VENOUS BLD VENIPUNCTURE: CPT

## 2023-09-27 PROCEDURE — 85025 COMPLETE CBC W/AUTO DIFF WBC: CPT

## 2023-09-27 PROCEDURE — 97110 THERAPEUTIC EXERCISES: CPT

## 2023-09-27 PROCEDURE — 99232 SBSQ HOSP IP/OBS MODERATE 35: CPT | Performed by: INTERNAL MEDICINE

## 2023-09-27 PROCEDURE — 94640 AIRWAY INHALATION TREATMENT: CPT

## 2023-09-27 PROCEDURE — 97116 GAIT TRAINING THERAPY: CPT

## 2023-09-27 PROCEDURE — 80048 BASIC METABOLIC PNL TOTAL CA: CPT

## 2023-09-27 PROCEDURE — 6360000002 HC RX W HCPCS: Performed by: INTERNAL MEDICINE

## 2023-09-27 PROCEDURE — 1200000000 HC SEMI PRIVATE

## 2023-09-27 RX ADMIN — Medication 10 ML: at 08:20

## 2023-09-27 RX ADMIN — LINEZOLID 600 MG: 600 TABLET, FILM COATED ORAL at 08:15

## 2023-09-27 RX ADMIN — METHOCARBAMOL TABLETS 750 MG: 750 TABLET, COATED ORAL at 20:33

## 2023-09-27 RX ADMIN — METHOCARBAMOL TABLETS 750 MG: 750 TABLET, COATED ORAL at 08:17

## 2023-09-27 RX ADMIN — Medication 4.5 MG: at 20:34

## 2023-09-27 RX ADMIN — APIXABAN 5 MG: 5 TABLET, FILM COATED ORAL at 08:15

## 2023-09-27 RX ADMIN — ATORVASTATIN CALCIUM 40 MG: 40 TABLET, FILM COATED ORAL at 20:33

## 2023-09-27 RX ADMIN — MOMETASONE FUROATE AND FORMOTEROL FUMARATE DIHYDRATE 2 PUFF: 200; 5 AEROSOL RESPIRATORY (INHALATION) at 09:07

## 2023-09-27 RX ADMIN — SODIUM CHLORIDE 3000 MG: 900 INJECTION INTRAVENOUS at 16:10

## 2023-09-27 RX ADMIN — GABAPENTIN 600 MG: 300 CAPSULE ORAL at 20:33

## 2023-09-27 RX ADMIN — GABAPENTIN 600 MG: 300 CAPSULE ORAL at 08:15

## 2023-09-27 RX ADMIN — SODIUM CHLORIDE 3000 MG: 900 INJECTION INTRAVENOUS at 08:24

## 2023-09-27 RX ADMIN — SODIUM CHLORIDE 3000 MG: 900 INJECTION INTRAVENOUS at 23:53

## 2023-09-27 RX ADMIN — MOMETASONE FUROATE AND FORMOTEROL FUMARATE DIHYDRATE 2 PUFF: 200; 5 AEROSOL RESPIRATORY (INHALATION) at 21:04

## 2023-09-27 RX ADMIN — HYDROCODONE BITARTRATE AND ACETAMINOPHEN 1 TABLET: 5; 325 TABLET ORAL at 08:17

## 2023-09-27 RX ADMIN — APIXABAN 5 MG: 5 TABLET, FILM COATED ORAL at 20:33

## 2023-09-27 RX ADMIN — LATANOPROST 1 DROP: 50 SOLUTION OPHTHALMIC at 21:33

## 2023-09-27 RX ADMIN — EMPAGLIFLOZIN 25 MG: 25 TABLET, FILM COATED ORAL at 08:14

## 2023-09-27 RX ADMIN — MICONAZOLE NITRATE: 2 POWDER TOPICAL at 21:33

## 2023-09-27 RX ADMIN — Medication 10 ML: at 20:48

## 2023-09-27 RX ADMIN — LEVOTHYROXINE SODIUM 50 MCG: 0.05 TABLET ORAL at 05:33

## 2023-09-27 RX ADMIN — FUROSEMIDE 20 MG: 20 TABLET ORAL at 08:15

## 2023-09-27 ASSESSMENT — PAIN DESCRIPTION - LOCATION: LOCATION: LEG;SHOULDER

## 2023-09-27 ASSESSMENT — PAIN DESCRIPTION - PAIN TYPE: TYPE: ACUTE PAIN

## 2023-09-27 ASSESSMENT — PAIN SCALES - GENERAL
PAINLEVEL_OUTOF10: 4
PAINLEVEL_OUTOF10: 6

## 2023-09-27 ASSESSMENT — PAIN DESCRIPTION - ORIENTATION: ORIENTATION: LEFT;RIGHT

## 2023-09-27 ASSESSMENT — PAIN - FUNCTIONAL ASSESSMENT: PAIN_FUNCTIONAL_ASSESSMENT: ACTIVITIES ARE NOT PREVENTED

## 2023-09-27 ASSESSMENT — PAIN DESCRIPTION - FREQUENCY: FREQUENCY: INTERMITTENT

## 2023-09-27 ASSESSMENT — PAIN DESCRIPTION - ONSET: ONSET: ON-GOING

## 2023-09-27 ASSESSMENT — PAIN DESCRIPTION - DESCRIPTORS: DESCRIPTORS: ACHING;CRAMPING

## 2023-09-27 NOTE — PROGRESS NOTES
Infectious Diseases Inpatient Progress Note      CHIEF COMPLAINT:     WBC elevation  Rt leg cellulitis  CHF  LISA  DM  Crp ELEVATION        HISTORY OF PRESENT ILLNESS:  70 y.o.  man with DM.,  Congestive heart failure, hypertension, obstructive sleep apnea, morbid obesity BMI 44 admitted to hospital secondary to fever and right leg pain swelling redness. Patient sustained injury to the right leg on the lateral aspect of the LEG Associated with cellulitis extending up to the knee AND  has a Scab. He has severe throbbing pain and presented to hospital for further evaluation. Labs significant 0.9 WBC 17 with left shift hemoglobin 18.2, blood culture in process. He was also c/o some increased urinary frequency with  some burning with urination  Location :Rt leg pain swelling    Quality :   Aching throbbing  Severity : 10 out of 10  Duration : 3 days  Timing : Constant   context : Trauma to the right leg  Modifying factors : None  Associated signs and symptoms: Local skin changes, swelling, redness, pain, low-grade fever      Interval History : Rt leg pain and swelling slowly improving no chills and Rt leg pain better and redness improving and no chills no vomiting      Past Medical History:    Past Medical History:   Diagnosis Date    Chronic diastolic CHF (congestive heart failure) (HCC)     DM (diabetes mellitus), type 2 (HCC)     HTN (hypertension)     Sleep apnea        Past Surgical History:    No past surgical history on file. Current Medications:    Outpatient Medications Marked as Taking for the 9/21/23 encounter [facility] Encounter)   Medication Sig Dispense Refill    levothyroxine (SYNTHROID) 50 MCG tablet Take 1 tablet by mouth Daily         Allergies:  Patient has no known allergies.     Immunizations :   Immunization History   Administered Date(s) Administered    COVID-19, PFIZER PURPLE top, DILUTE for use, (age 15 y+), 30mcg/0.3mL 04/21/2021, 05/12/2021    Influenza, AFLURIA (age 1 yrs+), COLLECTED:  09/21/23 09:57   ANTIBIOTICS AT TORSTEN.:                      RECEIVED :  09/21/23 11:05   MRSA DNA Probe, Nasal [[id number]] Collected: 09/21/23 2110   Order Status: Completed Specimen: Nares Updated: 09/22/23 0340    MRSA SCREEN RT-PCR --    Negative  MRSA DNA not detected. Normal Range: Not detected    Narrative:     ORDER#: [id number]                          ORDERED BY: Niecy Dumont   SOURCE: Nares                              COLLECTED:  09/21/23 21:10   ANTIBIOTICS AT TORSTEN.:                      RECEIVED :  09/21/23 21:13   Culture, Blood 2 [[id number]] Collected: 09/21/23 1210   Order Status: Sent Specimen: Blood Updated: 09/21/23 1833   Culture, Blood 1 [[id number]] Collected: 09/21/23 1210   Order Status: Sent Specimen: Blood Updated: 09/21/23 1833     Viral Culture:    No results found for: \"COVID19\"  Urine Culture: No results for input(s): \"LABURIN\" in the last 72 hours.       Scheduled Meds:   lidocaine 1 % injection  5 mL IntraDERmal Once    sodium chloride flush  5-40 mL IntraVENous 2 times per day    nicotine  1 patch TransDERmal Daily    linezolid  600 mg Oral 2 times per day    miconazole   Topical BID    furosemide  40 mg IntraVENous Once    ceFAZolin  3,000 mg IntraVENous Q8H    apixaban  5 mg Oral BID    atorvastatin  40 mg Oral Nightly    Dulaglutide  3 mg SubCUTAneous Weekly    empagliflozin  25 mg Oral Daily    furosemide  20 mg Oral Daily    gabapentin  600 mg Oral BID    latanoprost  1 drop Both Eyes Nightly    levothyroxine  50 mcg Oral Daily    lisinopril  10 mg Oral Daily    sodium chloride flush  5-40 mL IntraVENous 2 times per day    insulin lispro  0-4 Units SubCUTAneous TID     insulin lispro  0-4 Units SubCUTAneous Nightly    mometasone-formoterol  2 puff Inhalation BID RT       Continuous Infusions:   sodium chloride 100 mL/hr at 09/27/23 0533    dextrose         PRN Meds:  sodium chloride flush, sodium chloride, HYDROcodone 5 mg - acetaminophen,

## 2023-09-27 NOTE — PROGRESS NOTES
Physical Therapy  Facility/Department: [address] MED SURG  Physical Therapy Daily Note and Discharge Note    Name: Sybil Luz  : 1955  MRN: [id number]  Date of Service: 2023    Discharge Recommendations:  Home with assist PRN, No therapy recommended at discharge   Sybil Luz scored a 23/24 on the AM-PAC short mobility form. At this time, no further PT is recommended upon discharge due to no needs. Recommend patient returns to prior setting with prior services. PT Equipment Recommendations  Equipment Needed: No  Other: the pt is working on getting equipment thru his MD and insurance on his own and does not want this facility to issue any equipment at d/c      Patient Diagnosis(es): The primary encounter diagnosis was Cellulitis of right lower extremity. A diagnosis of Urinary tract infection without hematuria, site unspecified was also pertinent to this visit. Past Medical History:  has a past medical history of Chronic diastolic CHF (congestive heart failure) ([address]), DM (diabetes mellitus), type 2 ([address]), HTN (hypertension), and Sleep apnea. Past Surgical History:  has no past surgical history on file. Assessment   Assessment: Today, the pt demonstrated that he is now functioning at his baseline level and is up ad kendal in the room. The pt is now mod I for all transfers and is able to ambulate with the bariatric RW in the room setting w/o LOB. Discussed at [facility] equipment needs with the pt and he does not want this [facility] to issue any equipment as he is working on getting a bariatric rollator, a bariatric RW and an electric recliner thru his MD and his insurance. He reports he does not feel he needs further skilled PT here or at home and has no concerns re: going home and being able to manage. The pt requesting PT services to end. Anticipate that the pt will be safe for home and would benefit from a RW for safety in the house. Will sign off.   Therapy Prognosis: Good  Barriers to Learning: down to feet x 3 reps; standing trunk rotation x 3 reps; shoulder flexion on the R only x 3 reps (these exercises all completed by the pt w/o direction of therapist)          AM-PAC Score  AM-PAC Inpatient Mobility Raw Score : 23 (09/27/23 1011)  AM-PAC Inpatient T-Scale Score : 56.93 (09/27/23 1011)  Mobility Inpatient CMS 0-100% Score: 11.2 (09/27/23 1011)  Mobility Inpatient CMS G-Code Modifier : CI (09/27/23 1011)          Goals  Short Term Goals  Time Frame for Short Term Goals: upon d/c - all goals met as of 9-27  Short Term Goal 1: Patient will complete bed mobility at Mod I  Short Term Goal 2: Patient will perform transfers Mod I  Short Term Goal 3: Patient will amb 48' w/ LRAD at Mod I  Patient Goals   Patient Goals : to return home       Education  Patient Education  Education Given To: Patient  Education Provided: Role of Therapy;Plan of Care;Transfer Training  Education Provided Comments: educated on seated ex  Education Method: Verbal  Barriers to Learning: None  Education Outcome: Verbalized understanding;Demonstrated understanding      Therapy Time   Individual Concurrent Group Co-treatment   Time In 0947         Time Out 1023         Minutes 36               Electronically signed by Rik Mann PT [id number] on 9/27/2023 at 10:25 AM

## 2023-09-28 VITALS
RESPIRATION RATE: 18 BRPM | HEIGHT: 71 IN | WEIGHT: 315 LBS | TEMPERATURE: 97.3 F | HEART RATE: 59 BPM | DIASTOLIC BLOOD PRESSURE: 61 MMHG | OXYGEN SATURATION: 95 % | SYSTOLIC BLOOD PRESSURE: 101 MMHG | BODY MASS INDEX: 44.1 KG/M2

## 2023-09-28 LAB
ANION GAP SERPL CALCULATED.3IONS-SCNC: 9 MMOL/L (ref 3–16)
BASOPHILS # BLD: 0.1 K/UL (ref 0–0.2)
BASOPHILS NFR BLD: 0.7 %
BUN SERPL-MCNC: 12 MG/DL (ref 7–20)
CALCIUM SERPL-MCNC: 8.9 MG/DL (ref 8.3–10.6)
CHLORIDE SERPL-SCNC: 104 MMOL/L (ref 99–110)
CO2 SERPL-SCNC: 27 MMOL/L (ref 21–32)
CREAT SERPL-MCNC: 0.9 MG/DL (ref 0.8–1.3)
DEPRECATED RDW RBC AUTO: 15.9 % (ref 12.4–15.4)
EOSINOPHIL # BLD: 0.2 K/UL (ref 0–0.6)
EOSINOPHIL NFR BLD: 3.1 %
GFR SERPLBLD CREATININE-BSD FMLA CKD-EPI: >60 ML/MIN/{1.73_M2}
GLUCOSE BLD-MCNC: 148 MG/DL (ref 70–99)
GLUCOSE BLD-MCNC: 86 MG/DL (ref 70–99)
GLUCOSE SERPL-MCNC: 97 MG/DL (ref 70–99)
HCT VFR BLD AUTO: 49 % (ref 40.5–52.5)
HGB BLD-MCNC: 16.4 G/DL (ref 13.5–17.5)
LYMPHOCYTES # BLD: 1.6 K/UL (ref 1–5.1)
LYMPHOCYTES NFR BLD: 21.6 %
MCH RBC QN AUTO: 29.3 PG (ref 26–34)
MCHC RBC AUTO-ENTMCNC: 33.4 G/DL (ref 31–36)
MCV RBC AUTO: 87.7 FL (ref 80–100)
MONOCYTES # BLD: 0.6 K/UL (ref 0–1.3)
MONOCYTES NFR BLD: 8.2 %
NEUTROPHILS # BLD: 5.1 K/UL (ref 1.7–7.7)
NEUTROPHILS NFR BLD: 66.4 %
PERFORMED ON: ABNORMAL
PERFORMED ON: NORMAL
PLATELET # BLD AUTO: 224 K/UL (ref 135–450)
PMV BLD AUTO: 8.2 FL (ref 5–10.5)
POTASSIUM SERPL-SCNC: 4.1 MMOL/L (ref 3.5–5.1)
RBC # BLD AUTO: 5.59 M/UL (ref 4.2–5.9)
SODIUM SERPL-SCNC: 140 MMOL/L (ref 136–145)
WBC # BLD AUTO: 7.6 K/UL (ref 4–11)

## 2023-09-28 PROCEDURE — 94760 N-INVAS EAR/PLS OXIMETRY 1: CPT

## 2023-09-28 PROCEDURE — 97535 SELF CARE MNGMENT TRAINING: CPT

## 2023-09-28 PROCEDURE — 85025 COMPLETE CBC W/AUTO DIFF WBC: CPT

## 2023-09-28 PROCEDURE — 2580000003 HC RX 258: Performed by: INTERNAL MEDICINE

## 2023-09-28 PROCEDURE — 6370000000 HC RX 637 (ALT 250 FOR IP): Performed by: INTERNAL MEDICINE

## 2023-09-28 PROCEDURE — 6360000002 HC RX W HCPCS: Performed by: INTERNAL MEDICINE

## 2023-09-28 PROCEDURE — 36415 COLL VENOUS BLD VENIPUNCTURE: CPT

## 2023-09-28 PROCEDURE — 80048 BASIC METABOLIC PNL TOTAL CA: CPT

## 2023-09-28 PROCEDURE — 97530 THERAPEUTIC ACTIVITIES: CPT

## 2023-09-28 RX ORDER — CEPHALEXIN 500 MG/1
1000 CAPSULE ORAL EVERY 8 HOURS
Qty: 30 CAPSULE | Refills: 0 | Status: SHIPPED | OUTPATIENT
Start: 2023-09-28 | End: 2023-10-08

## 2023-09-28 RX ADMIN — MICONAZOLE NITRATE: 2 POWDER TOPICAL at 09:51

## 2023-09-28 RX ADMIN — APIXABAN 5 MG: 5 TABLET, FILM COATED ORAL at 09:44

## 2023-09-28 RX ADMIN — LEVOTHYROXINE SODIUM 50 MCG: 0.05 TABLET ORAL at 05:43

## 2023-09-28 RX ADMIN — FUROSEMIDE 20 MG: 20 TABLET ORAL at 09:44

## 2023-09-28 RX ADMIN — METHOCARBAMOL TABLETS 750 MG: 750 TABLET, COATED ORAL at 09:53

## 2023-09-28 RX ADMIN — SODIUM CHLORIDE 3000 MG: 900 INJECTION INTRAVENOUS at 09:59

## 2023-09-28 RX ADMIN — GABAPENTIN 600 MG: 300 CAPSULE ORAL at 09:44

## 2023-09-28 RX ADMIN — LISINOPRIL 10 MG: 10 TABLET ORAL at 09:44

## 2023-09-28 RX ADMIN — EMPAGLIFLOZIN 25 MG: 25 TABLET, FILM COATED ORAL at 09:44

## 2023-09-28 RX ADMIN — Medication 10 ML: at 09:45

## 2023-09-28 NOTE — PROGRESS NOTES
Occupational Therapy  Facility/Department: [facility]  Occupational Therapy Daily Treatment and Discharge Summary    Name: Elda Morales  : 1955  MRN: [id number]  Date of Service: 2023    Discharge Recommendations:  Home with assist PRN      Elda Morales scored a 23/24 on the AM-PAC ADL Inpatient form. At this time, no further OT is recommended upon discharge due to pt functioning at/near baseline. Recommend patient returns to prior setting with prior services. Patient Diagnosis(es): The primary encounter diagnosis was Cellulitis of right lower extremity. A diagnosis of Urinary tract infection without hematuria, site unspecified was also pertinent to this visit. Past Medical History:  has a past medical history of Chronic diastolic CHF (congestive heart failure) ([address]), DM (diabetes mellitus), type 2 ([address]), HTN (hypertension), and Sleep apnea. Past Surgical History:  has no past surgical history on file. Treatment Diagnosis: decreased ADL status/fxl mobility/strength/ROM      Assessment   Assessment: Pt now functioning at/near baseline, and does not require any additional acute OT. Pt UAL in room completing majority of ADLs with mod I, and fxl mobility with RW and mod I. Anticipate pt will be safe to return home with assist prn. No further acute OT services indicated, will discharge. Prognosis: Good  History: see above  No Skilled OT: At baseline function; Safe to return home; No OT goals identified  REQUIRES OT FOLLOW-UP: No  Activity Tolerance  Activity Tolerance: Patient Tolerated treatment well        Plan   Occupational Therapy Plan  Times Per Week: d/c acute OT  Current Treatment Recommendations: Strengthening, Balance training, Functional mobility training, ROM, Endurance training, Patient/Caregiver education & training, Safety education & training, Equipment evaluation, education, & procurement, Self-Care / ADL and mod I.  Static Sitting Balance: independent on shower chair and EOB  Dynamic Sitting Balance: independent on shower chair during shower  Static Standing Balance: independent/mod I  Dynamic Standing Balance: mod I with RW during fxl mobility, independent/mod I during shower    Education Given To: Patient  Education Provided: Role of Therapy;Plan of Care;Transfer Training;ADL Adaptive Strategies; Equipment  Education Method: Demonstration;Verbal  Barriers to Learning: None  Education Outcome: Verbalized understanding;Demonstrated understanding                          AM-PAC Score        AM-PAC Inpatient Daily Activity Raw Score: 23 (09/28/23 0949)  AM-PAC Inpatient ADL T-Scale Score : 51.12 (09/28/23 0949)  ADL Inpatient CMS 0-100% Score: 15.86 (09/28/23 0949)  ADL Inpatient CMS G-Code Modifier : CI (09/28/23 0949)         Goals  Short Term Goals  Time Frame for Short Term Goals: prior to d/c  Short Term Goal 1: mod I fxl tx and fxl mobility in prep to complete ADL routine-goal met. Short Term Goal 2: mod I toileting-goal met per pt report  Short Term Goal 3: mod I LB dressing/bathing-goal met LB bathing, not met LB dressing as pt required assist for L sock, but reports he does not wear socks at baseline. Short Term Goal 4: mod I standing x5 minutes to complete ADL task-goal met. Short Term Goal 5: Pt will complete BUE exercises in all planes that pt tolerates in prep to complete ADL task-goal not addressed. Long Term Goals  Time Frame for Long Term Goals : STGs=LTGs  Patient Goals   Patient goals :  To return home and to be pain free       Therapy Time   Individual Concurrent Group Co-treatment   Time In 360 Danny         Time Out 0945         Minutes 702 [address] [place] [address] 4705

## 2023-09-28 NOTE — CARE COORDINATION
DISCHARGE SUMMARY     DATE OF DISCHARGE: 9/28/23    DISCHARGE DESTINATION: Home    HOME CARE: Yes    Agency Name:   Discharging to Facility/ Agency   Name: [facility]  Address:  [address] [place] [address]   Phone:  [phone]  Fax:  [phone]     HEMODIALYSIS: No    TRANSPORTATION: LYFT    Flex ride sent  Flexible ride link valid between 9/28, 02:42 PM EDT - 9/29, 02:42 PM EDT  Sandee Avendano  Full name  Cleopatra Mendes  Mobile phone number  [phone] type  Lyft (4 seats)  [address], [facility] may vary from the original request due to minor pickup and drop-off changes. For example, an address may vary if a rider is picked up across the street. Request details  Coordinator name  Jana Yang  Date and time  9/28/23, 02:42 PM EDT  Flex ride ID  [id number]  Internal note  main entrance    NEW DME ORDERED: no    COMMENTS: Discharge to home with [facility], agency notified of discharge, resume Fast Track to home services and MOW's. Electronically signed by Alena Fields RN on 9/28/2023 at 11:30 AM

## 2023-09-28 NOTE — PROGRESS NOTES
Pt provided d/c instructions, all questions answered at this time. Pt's PICC line removed without complication. Pt belongings gathered. Script for antibiotics handed to pt. Pt wheeled down to ride.  Electronically signed by Pillo Lawler RN on 9/28/2023 at 3:57 PM

## 2023-09-28 NOTE — PLAN OF CARE
Problem: Discharge Planning  Goal: Discharge to home or other facility with appropriate resources  9/22/2023 0056 by Michelle Fairbanks RN  Outcome: Progressing  9/21/2023 1836 by Paris Pro RN  Outcome: Progressing  Flowsheets (Taken 9/21/2023 1415)  Discharge to home or other facility with appropriate resources:   Identify barriers to discharge with patient and caregiver   Arrange for needed discharge resources and transportation as appropriate   Identify discharge learning needs (meds, wound care, etc)   Continuing to work with patient and health care team on discharge plan. Discharge instructions and medication management will be reviewed prior to discharge. Problem: Safety - Adult  Goal: Free from fall injury  9/22/2023 0056 by Michelle Fairbanks RN  Outcome: Progressing  9/21/2023 1836 by Paris Pro RN  Outcome: Progressing  Flowsheets (Taken 9/21/2023 1415)  Free From Fall Injury: Instruct family/caregiver on patient safety   Pt free from falls this shift. Fall precautions in place at all times. Call light always within reach. Pt able and agreeable to contact for safety appropriately. Problem: Pain  Goal: Verbalizes/displays adequate comfort level or baseline comfort level  9/22/2023 0056 by Michelle Fairbanks RN  Outcome: Progressing  9/21/2023 1836 by Paris Pro RN  Outcome: Progressing   Pt able to express presence/absence of pain and rate pain appropriately using numerical scale. Pain/discomfort being managed with PRN analgesics per MD orders (see MAR). Pain assessed every shift and after interventions.     Problem: ABCDS Injury Assessment  Goal: Absence of physical injury  9/22/2023 0056 by Michelle Fairbanks RN  Outcome: Progressing  9/21/2023 1836 by Paris Pro RN  Outcome: Progressing
Problem: Discharge Planning  Goal: Discharge to home or other facility with appropriate resources  9/25/2023 0318 by Brannon Cobian RN  Outcome: Progressing  Flowsheets (Taken 9/24/2023 2202)  Discharge to home or other facility with appropriate resources:   Identify barriers to discharge with patient and caregiver   Arrange for needed discharge resources and transportation as appropriate   Identify discharge learning needs (meds, wound care, etc)  9/24/2023 1540 by Mi White RN  Outcome: Progressing     Problem: Safety - Adult  Goal: Free from fall injury  9/25/2023 0318 by Brannon Cobian RN  Outcome: Progressing  9/24/2023 1540 by Mi White RN  Outcome: Progressing     Problem: Pain  Goal: Verbalizes/displays adequate comfort level or baseline comfort level  9/24/2023 1540 by Mi White RN  Outcome: Progressing     Problem: ABCDS Injury Assessment  Goal: Absence of physical injury  9/24/2023 1540 by Mi White RN  Outcome: Progressing     Problem: Chronic Conditions and Co-morbidities  Goal: Patient's chronic conditions and co-morbidity symptoms are monitored and maintained or improved  Recent Flowsheet Documentation  Taken 9/24/2023 2202 by Brannon Cobian [facility] - Patient's Chronic Conditions and Co-Morbidity Symptoms are Monitored and Maintained or Improved:   Monitor and assess patient's chronic conditions and comorbid symptoms for stability, deterioration, or improvement   Collaborate with multidisciplinary team to address chronic and comorbid conditions and prevent exacerbation or deterioration   Update acute care plan with appropriate goals if chronic or comorbid symptoms are exacerbated and prevent overall improvement and discharge  9/24/2023 1540 by Mi White RN  Outcome: Progressing     Problem: Skin/Tissue Integrity  Goal: Absence of new skin breakdown  Description: 1. Monitor for areas of redness and/or skin breakdown  2. Assess vascular access sites hourly  3.
Problem: Discharge Planning  Goal: Discharge to home or other facility with appropriate resources  9/26/2023 1955 by Calvin Laboy RN  Outcome: Progressing  9/26/2023 1317 by Samanta Theodore RN  Outcome: Progressing  Flowsheets (Taken 9/26/2023 1189)  Discharge to home or other facility with appropriate resources: Identify barriers to discharge with patient and caregiver     Problem: Safety - Adult  Goal: Free from fall injury  9/26/2023 1955 by Calvin Laboy RN  Outcome: Progressing  9/26/2023 1317 by Samanta Theodore RN  Outcome: Progressing     Problem: Pain  Goal: Verbalizes/displays adequate comfort level or baseline comfort level  9/26/2023 1955 by Calvin Laboy RN  Outcome: Progressing  9/26/2023 1317 by Samanta Theodore RN  Outcome: Progressing     Problem: ABCDS Injury Assessment  Goal: Absence of physical injury  9/26/2023 1955 by Calvin Laboy RN  Outcome: Progressing  9/26/2023 1317 by Samanta Theodore RN  Outcome: Progressing     Problem: Chronic Conditions and Co-morbidities  Goal: Patient's chronic conditions and co-morbidity symptoms are monitored and maintained or improved  9/26/2023 1955 by Calvin Laboy RN  Outcome: Progressing  9/26/2023 1317 by Samanta Theodore RN  Outcome: Progressing  Flowsheets (Taken 9/26/2023 0809)  Care Plan - Patient's Chronic Conditions and Co-Morbidity Symptoms are Monitored and Maintained or Improved: Monitor and assess patient's chronic conditions and comorbid symptoms for stability, deterioration, or improvement     Problem: Skin/Tissue Integrity  Goal: Absence of new skin breakdown  Description: 1. Monitor for areas of redness and/or skin breakdown  2. Assess vascular access sites hourly  3. Every 4-6 hours minimum:  Change oxygen saturation probe site  4. Every 4-6 hours:  If on nasal continuous positive airway pressure, respiratory therapy assess nares and determine need for appliance change or resting period.   9/26/2023 1955 by Calvin Laboy
Problem: Discharge Planning  Goal: Discharge to home or other facility with appropriate resources  9/27/2023 2255 by Bienvenido Cheema RN  Outcome: Progressing  9/27/2023 1024 by Mani Mohamud RN  Outcome: Progressing  Flowsheets (Taken 9/27/2023 0820)  Discharge to home or other facility with appropriate resources: Identify barriers to discharge with patient and caregiver     Problem: Safety - Adult  Goal: Free from fall injury  9/27/2023 2255 by Bienvenido Cheema RN  Outcome: Progressing  9/27/2023 1024 by Mani Mohamud RN  Outcome: Progressing     Problem: Pain  Goal: Verbalizes/displays adequate comfort level or baseline comfort level  9/27/2023 2255 by Bienvenido Cheema RN  Outcome: Progressing  9/27/2023 1024 by Mani Mohamud RN  Outcome: Progressing     Problem: ABCDS Injury Assessment  Goal: Absence of physical injury  9/27/2023 2255 by Bienvenido Cheema RN  Outcome: Progressing  9/27/2023 1024 by Mani Mohamud RN  Outcome: Progressing     Problem: Chronic Conditions and Co-morbidities  Goal: Patient's chronic conditions and co-morbidity symptoms are monitored and maintained or improved  9/27/2023 2255 by Bienvenido Cheema RN  Outcome: Progressing  9/27/2023 1024 by Mani Mohamud RN  Outcome: Progressing  Flowsheets (Taken 9/27/2023 0820)  Care Plan - Patient's Chronic Conditions and Co-Morbidity Symptoms are Monitored and Maintained or Improved: Monitor and assess patient's chronic conditions and comorbid symptoms for stability, deterioration, or improvement     Problem: Skin/Tissue Integrity  Goal: Absence of new skin breakdown  Description: 1. Monitor for areas of redness and/or skin breakdown  2. Assess vascular access sites hourly  3. Every 4-6 hours minimum:  Change oxygen saturation probe site  4. Every 4-6 hours:  If on nasal continuous positive airway pressure, respiratory therapy assess nares and determine need for appliance change or resting period.   9/27/2023 2255 by Bienvenido Cheema RN  Outcome:
Problem: Discharge Planning  Goal: Discharge to home or other facility with appropriate resources  9/28/2023 1146 by Donna Ball RN  Outcome: Completed  9/28/2023 1051 by Donna Ball RN  Outcome: Progressing  9/27/2023 2255 by Unique Greer RN  Outcome: Progressing  Flowsheets (Taken 9/27/2023 2030)  Discharge to home or other facility with appropriate resources:   Identify barriers to discharge with patient and caregiver   Arrange for needed discharge resources and transportation as appropriate   Identify discharge learning needs (meds, wound care, etc)     Problem: Safety - Adult  Goal: Free from fall injury  9/28/2023 1146 by Donna Ball RN  Outcome: Completed  9/28/2023 1051 by Donna Ball RN  Outcome: Progressing  9/27/2023 2255 by Unique Greer RN  Outcome: Progressing     Problem: Pain  Goal: Verbalizes/displays adequate comfort level or baseline comfort level  9/28/2023 1146 by Donna Ball RN  Outcome: Completed  9/28/2023 1051 by Donna Ball RN  Outcome: Progressing  [address] (Taken 9/27/2023 2256 by Unqiue Greer RN)  Verbalizes/displays adequate comfort level or baseline comfort level:   Encourage patient to monitor pain and request assistance   Assess pain using appropriate pain scale   Administer analgesics based on type and severity of pain and evaluate response   Implement non-pharmacological measures as appropriate and evaluate response   Consider cultural and social influences on pain and pain management   Notify Licensed Independent Practitioner if interventions unsuccessful or patient reports new pain  9/27/2023 2255 by Unique Greer RN  Outcome: Progressing     Problem: ABCDS Injury Assessment  Goal: Absence of physical injury  9/28/2023 1146 by Donna Ball RN  Outcome: Completed  9/28/2023 1051 by Donna Ball RN  Outcome: Progressing  9/27/2023 2255 by Unique Greer RN  Outcome: Progressing     Problem: Chronic Conditions and Co-morbidities  Goal: Patient's
Problem: Discharge Planning  Goal: Discharge to home or other facility with appropriate resources  Outcome: Progressing     Problem: Safety - Adult  Goal: Free from fall injury  Outcome: Progressing     Problem: Pain  Goal: Verbalizes/displays adequate comfort level or baseline comfort level  Outcome: Progressing     Problem: ABCDS Injury Assessment  Goal: Absence of physical injury  Outcome: Progressing
Problem: Discharge Planning  Goal: Discharge to home or other facility with appropriate resources  Outcome: Progressing     Problem: Safety - Adult  Goal: Free from fall injury  Outcome: Progressing     Problem: Pain  Goal: Verbalizes/displays adequate comfort level or baseline comfort level  Outcome: Progressing     Problem: ABCDS Injury Assessment  Goal: Absence of physical injury  Outcome: Progressing     Problem: Chronic Conditions and Co-morbidities  Goal: Patient's chronic conditions and co-morbidity symptoms are monitored and maintained or improved  Outcome: Progressing
Problem: Discharge Planning  Goal: Discharge to home or other facility with appropriate resources  Outcome: Progressing     Problem: Safety - Adult  Goal: Free from fall injury  Outcome: Progressing     Problem: Pain  Goal: Verbalizes/displays adequate comfort level or baseline comfort level  Outcome: Progressing     Problem: ABCDS Injury Assessment  Goal: Absence of physical injury  Outcome: Progressing     Problem: Chronic Conditions and Co-morbidities  Goal: Patient's chronic conditions and co-morbidity symptoms are monitored and maintained or improved  Outcome: Progressing     Problem: Skin/Tissue Integrity  Goal: Absence of new skin breakdown  Description: 1. Monitor for areas of redness and/or skin breakdown  2. Assess vascular access sites hourly  3. Every 4-6 hours minimum:  Change oxygen saturation probe site  4. Every 4-6 hours:  If on nasal continuous positive airway pressure, respiratory therapy assess nares and determine need for appliance change or resting period.   Outcome: Progressing
Problem: Discharge Planning  Goal: Discharge to home or other facility with appropriate resources  Outcome: Progressing  Flowsheets (Taken 9/21/2023 1415)  Discharge to home or other facility with appropriate resources:   Identify barriers to discharge with patient and caregiver   Arrange for needed discharge resources and transportation as appropriate   Identify discharge learning needs (meds, wound care, etc)     Problem: Safety - Adult  Goal: Free from fall injury  Outcome: Progressing  Flowsheets (Taken 9/21/2023 1415)  Free From Fall Injury: Instruct family/caregiver on patient safety     Problem: Pain  Goal: Verbalizes/displays adequate comfort level or baseline comfort level  Outcome: Progressing     Problem: ABCDS Injury Assessment  Goal: Absence of physical injury  Outcome: Progressing     Problem: Chronic Conditions and Co-morbidities  Goal: Patient's chronic conditions and co-morbidity symptoms are monitored and maintained or improved  Outcome: Progressing
Problem: Discharge Planning  Goal: Discharge to home or other facility with appropriate resources  Outcome: Progressing  Flowsheets (Taken 9/26/2023 0835)  Discharge to home or other facility with appropriate resources: Identify barriers to discharge with patient and caregiver     Problem: Safety - Adult  Goal: Free from fall injury  Outcome: Progressing     Problem: Pain  Goal: Verbalizes/displays adequate comfort level or baseline comfort level  Outcome: Progressing     Problem: ABCDS Injury Assessment  Goal: Absence of physical injury  Outcome: Progressing     Problem: Chronic Conditions and Co-morbidities  Goal: Patient's chronic conditions and co-morbidity symptoms are monitored and maintained or improved  Outcome: Progressing  Flowsheets (Taken 9/26/2023 0835)  Care Plan - Patient's Chronic Conditions and Co-Morbidity Symptoms are Monitored and Maintained or Improved: Monitor and assess patient's chronic conditions and comorbid symptoms for stability, deterioration, or improvement     Problem: Skin/Tissue Integrity  Goal: Absence of new skin breakdown  Description: 1. Monitor for areas of redness and/or skin breakdown  2. Assess vascular access sites hourly  3. Every 4-6 hours minimum:  Change oxygen saturation probe site  4. Every 4-6 hours:  If on nasal continuous positive airway pressure, respiratory therapy assess nares and determine need for appliance change or resting period.   Outcome: Progressing     Problem: Nutrition Deficit:  Goal: Optimize nutritional status  Outcome: Progressing
Problem: Discharge Planning  Goal: Discharge to home or other facility with appropriate resources  Outcome: Progressing  Flowsheets (Taken 9/27/2023 0820)  Discharge to home or other facility with appropriate resources: Identify barriers to discharge with patient and caregiver     Problem: Safety - Adult  Goal: Free from fall injury  Outcome: Progressing     Problem: Pain  Goal: Verbalizes/displays adequate comfort level or baseline comfort level  Outcome: Progressing     Problem: ABCDS Injury Assessment  Goal: Absence of physical injury  Outcome: Progressing     Problem: Chronic Conditions and Co-morbidities  Goal: Patient's chronic conditions and co-morbidity symptoms are monitored and maintained or improved  Outcome: Progressing  Flowsheets (Taken 9/27/2023 0820)  Care Plan - Patient's Chronic Conditions and Co-Morbidity Symptoms are Monitored and Maintained or Improved: Monitor and assess patient's chronic conditions and comorbid symptoms for stability, deterioration, or improvement     Problem: Skin/Tissue Integrity  Goal: Absence of new skin breakdown  Description: 1. Monitor for areas of redness and/or skin breakdown  2. Assess vascular access sites hourly  3. Every 4-6 hours minimum:  Change oxygen saturation probe site  4. Every 4-6 hours:  If on nasal continuous positive airway pressure, respiratory therapy assess nares and determine need for appliance change or resting period.   Outcome: Progressing     Problem: Nutrition Deficit:  Goal: Optimize nutritional status  Outcome: Progressing     Problem: Respiratory - Adult  Goal: Achieves optimal ventilation and oxygenation  Outcome: Progressing  Flowsheets (Taken 9/27/2023 0820)  Achieves optimal ventilation and oxygenation: Assess for changes in respiratory status     Problem: Skin/Tissue Integrity - Adult  Goal: Skin integrity remains intact  Outcome: Progressing  Flowsheets (Taken 9/27/2023 0820)  Skin Integrity Remains Intact: Monitor for areas of
redness and/or skin breakdown   Assess vascular access sites hourly  Goal: Incisions, wounds, or drain sites healing without S/S of infection  9/28/2023 1051 by Otis Patel RN  Outcome: Progressing  Flowsheets (Taken 9/27/2023 2257 by Jessica Hurtado RN)  Incisions, Wounds, or Drain Sites Healing Without Sign and Symptoms of Infection: TWICE DAILY: Assess and document dressing/incision, wound bed, drain sites and surrounding tissue  9/27/2023 2255 by Jessica Hurtado RN  Outcome: Progressing  Flowsheets (Taken 9/27/2023 2030)  Incisions, Wounds, or Drain Sites Healing Without Sign and Symptoms of Infection: TWICE DAILY: Assess and document dressing/incision, wound bed, drain sites and surrounding tissue  Goal: Oral mucous membranes remain intact  9/28/2023 1051 by Otis Patel RN  Outcome: Progressing  Flowsheets (Taken 9/27/2023 2257 by Jessica Hurtado RN)  Oral Mucous Membranes Remain Intact:   Assess oral mucosa and hygiene practices   Implement preventative oral hygiene regimen   Implement oral medicated treatments as ordered  9/27/2023 2255 by eJssica Hurtado RN  Outcome: Progressing  Flowsheets (Taken 9/27/2023 2030)  Oral Mucous Membranes Remain Intact: Assess oral mucosa and hygiene practices     Problem: Musculoskeletal - Adult  Goal: Return mobility to safest level of function  9/28/2023 1051 by Otis Patel RN  Outcome: Progressing  9/27/2023 2255 by Jessica Hurtado RN  Outcome: Progressing  Flowsheets (Taken 9/27/2023 2030)  Return Mobility to Safest Level of Function: Assess patient stability and activity tolerance for standing, transferring and ambulating with or without assistive devices  Goal: Maintain proper alignment of affected body part  9/28/2023 1051 by Otis Patel RN  Outcome: Progressing  9/27/2023 2255 by Jessica Hurtado RN  Outcome: Progressing  Flowsheets (Taken 9/27/2023 2030)  Maintain proper alignment of affected body part: Support and protect limb and body alignment per

## 2023-09-28 NOTE — PROGRESS NOTES
Pending)       IP CONSULT TO INFECTIOUS DISEASES  IP CONSULT TO SOCIAL WORK  IP CONSULT TO DIABETES EDUCATOR  IP CONSULT TO CARDIOLOGY    Assessment/Plan:    Active Hospital Problems    Diagnosis     Chronic diastolic heart failure (HCC) [I50.32]     Paroxysmal atrial fibrillation (HCC) [I48.0]     Tobacco abuse [Z72.0]     Urinary tract infection without hematuria [N39.0]     Neutrophilia [D72.9]     Morbid obesity with BMI of 40.0-44.9, adult ([address]) [E66.01, Z68.41]     Anticoagulated [Z79.01]     Pain of right lower extremity [M79.604]     LISA (obstructive sleep apnea) [G47.33]     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 - Reviewed xray. Check MRSA nasal swab neg, ID consulted, cont IV abx. Midline placed  - May be ready to discharge on oral antibiotics in the next day     UTI - cont cefazolin, monitor urine cx     H/o DVT/PE - cont eliquis     DM II - fairly controlled, cont home meds, on SSI     Chr hypoxic resp failure - uses 2L O2 while sleeping     COPD - stable, cont inhalers     H/o CHF - cxr clear, bnp slightly elevated, consulted cardio. Received lasix X1 IV and now on PO lasix           DVT Prophylaxis: eliquis  Diet: ADULT DIET;  Regular  ADULT ORAL NUTRITION SUPPLEMENT; Breakfast, Dinner, Lunch; Diabetic Oral Supplement  Code Status: Full Code  PT/OT Eval Status: ordered    Dispo - cont care, poss dc in 1-2 days when ok with ID      Zander Dee MD

## 2023-09-28 NOTE — FLOWSHEET NOTE
Midline dressing to left arm changed. Patient has rested quietly throughout the night without complaints.

## 2023-09-28 NOTE — DISCHARGE SUMMARY
+------------------------+----------+---------------+----------+ ! GSV Below Knee          ! Yes       ! Yes            ! None      ! +------------------------+----------+---------------+----------+ ! Gastroc                 ! Yes       ! Yes            ! None      ! +------------------------+----------+---------------+----------+ ! Soleal                  !Yes       ! Yes            ! None      ! +------------------------+----------+---------------+----------+ ! PTV                     ! Partial   !Yes            ! None      ! +------------------------+----------+---------------+----------+ ! ATV                     ! No        !               !          ! +------------------------+----------+---------------+----------+ ! Peroneal                !No        !               !          ! +------------------------+----------+---------------+----------+ ! GSV Calf                ! Yes       ! Yes            ! None      ! +------------------------+----------+---------------+----------+ ! SSV                     ! Yes       ! Yes            ! None      ! +------------------------+----------+---------------+----------+ Right Doppler Measurements +--------------+------+------+------------+ ! Location      ! Signal!Reflux! Reflux (sec)! +--------------+------+------+------------+ ! Common Femoral!Phasic!      !            ! +--------------+------+------+------------+ ! Popliteal     !Phasic!      !            ! +--------------+------+------+------------+ Left Lower Extremities DVT Study Measurements Left 2D Measurements +--------------+----------+---------------+----------+ ! Location      ! Visualized! Compressibility! Thrombosis! +--------------+----------+---------------+----------+ ! Common Femoral!Yes       ! Yes            ! None      ! +--------------+----------+---------------+----------+ Left Doppler Measurements +--------------+------+------+------------+ ! Location      ! Signal!Reflux! Reflux (sec)! +--------------+------+------+------------+ ! Common

## 2023-09-29 NOTE — CARE COORDINATION
Home Care      Per Warren Escobar at [facility], patient declined [facility]. services at this time.     Summer Orozco RN, BSN CTN  [facility] [phone]

## 2024-04-08 PROBLEM — I50.9 CHRONIC CONGESTIVE HEART FAILURE (HCC): Status: ACTIVE | Noted: 2024-04-08

## 2024-04-08 PROBLEM — J44.9 COPD (CHRONIC OBSTRUCTIVE PULMONARY DISEASE) (HCC): Status: ACTIVE | Noted: 2024-04-08

## 2024-04-08 PROBLEM — R07.9 CHEST PAIN, RULE OUT ACUTE MYOCARDIAL INFARCTION: Status: RESOLVED | Noted: 2022-12-30 | Resolved: 2024-04-08

## 2024-04-08 PROBLEM — I82.512 CHRONIC DEEP VEIN THROMBOSIS (DVT) OF FEMORAL VEIN OF LEFT LOWER EXTREMITY (HCC): Status: RESOLVED | Noted: 2020-08-21 | Resolved: 2024-04-08

## 2024-04-08 PROBLEM — I63.9 CEREBROVASCULAR ACCIDENT (HCC): Chronic | Status: RESOLVED | Noted: 2022-08-05 | Resolved: 2024-04-08

## 2024-04-08 PROBLEM — E11.65 UNCONTROLLED TYPE 2 DIABETES MELLITUS WITH HYPERGLYCEMIA (HCC): Status: ACTIVE | Noted: 2022-07-21

## 2024-04-08 PROBLEM — I10 PRIMARY HYPERTENSION: Chronic | Status: ACTIVE | Noted: 2022-08-05

## 2024-04-08 PROBLEM — I50.32 CHRONIC DIASTOLIC HEART FAILURE (HCC): Status: ACTIVE | Noted: 2020-08-21

## 2024-04-08 PROBLEM — I25.10 CORONARY ATHEROSCLEROSIS: Chronic | Status: ACTIVE | Noted: 2024-04-08

## 2024-04-08 PROBLEM — L03.90 CELLULITIS: Status: RESOLVED | Noted: 2023-09-21 | Resolved: 2024-04-08

## 2024-04-08 PROBLEM — J96.01 ACUTE RESPIRATORY FAILURE WITH HYPOXIA (HCC): Status: RESOLVED | Noted: 2022-08-05 | Resolved: 2024-04-08

## 2024-04-08 PROBLEM — D72.828 NEUTROPHILIA: Status: RESOLVED | Noted: 2023-09-22 | Resolved: 2024-04-08

## 2024-04-08 PROBLEM — M79.604 PAIN OF RIGHT LOWER EXTREMITY: Status: RESOLVED | Noted: 2023-09-22 | Resolved: 2024-04-08

## 2024-04-08 PROBLEM — E03.4 HYPOTHYROIDISM DUE TO ACQUIRED ATROPHY OF THYROID: Status: ACTIVE | Noted: 2018-07-23

## 2024-04-08 PROBLEM — I48.0 PAF (PAROXYSMAL ATRIAL FIBRILLATION) (HCC): Status: ACTIVE | Noted: 2022-08-05

## 2024-04-08 PROBLEM — I89.0 LYMPHEDEMA OF BOTH LOWER EXTREMITIES: Status: ACTIVE | Noted: 2021-05-14

## 2024-04-08 PROBLEM — G47.33 OSA (OBSTRUCTIVE SLEEP APNEA): Status: ACTIVE | Noted: 2022-07-21

## 2024-04-08 PROBLEM — E78.2 MIXED HYPERLIPIDEMIA: Status: ACTIVE | Noted: 2024-04-08

## 2024-04-08 PROBLEM — H40.9 GLAUCOMA: Status: ACTIVE | Noted: 2024-04-08

## 2024-04-08 PROBLEM — I26.99 MULTIPLE PULMONARY EMBOLI (HCC): Status: RESOLVED | Noted: 2020-03-12 | Resolved: 2024-04-08

## 2024-04-08 PROBLEM — R07.9 CHEST PAIN: Status: RESOLVED | Noted: 2023-01-01 | Resolved: 2024-04-08

## 2024-04-08 PROBLEM — D72.9 NEUTROPHILIA: Status: RESOLVED | Noted: 2023-09-22 | Resolved: 2024-04-08

## 2024-06-05 ENCOUNTER — HOSPITAL ENCOUNTER (INPATIENT)
Age: 69
LOS: 5 days | Discharge: HOME | DRG: 638 | End: 2024-06-10
Attending: EMERGENCY MEDICINE | Admitting: STUDENT IN AN ORGANIZED HEALTH CARE EDUCATION/TRAINING PROGRAM
Payer: MEDICARE

## 2024-06-05 ENCOUNTER — APPOINTMENT (OUTPATIENT)
Dept: CT IMAGING | Age: 69
DRG: 638 | End: 2024-06-05
Payer: MEDICARE

## 2024-06-05 ENCOUNTER — APPOINTMENT (OUTPATIENT)
Dept: GENERAL RADIOLOGY | Age: 69
DRG: 638 | End: 2024-06-05
Payer: MEDICARE

## 2024-06-05 DIAGNOSIS — R07.9 CHEST PAIN, UNSPECIFIED TYPE: ICD-10-CM

## 2024-06-05 DIAGNOSIS — L03.115 CELLULITIS OF BOTH LOWER EXTREMITIES: ICD-10-CM

## 2024-06-05 DIAGNOSIS — L03.116 CELLULITIS OF BOTH LOWER EXTREMITIES: ICD-10-CM

## 2024-06-05 DIAGNOSIS — R79.89 ELEVATED D-DIMER: Primary | ICD-10-CM

## 2024-06-05 LAB
ALBUMIN SERPL-MCNC: 3.8 G/DL (ref 3.4–5)
ALBUMIN/GLOB SERPL: 1.2 {RATIO} (ref 1.1–2.2)
ALP SERPL-CCNC: 114 U/L (ref 40–129)
ALT SERPL-CCNC: 16 U/L (ref 10–40)
ANION GAP SERPL CALCULATED.3IONS-SCNC: 11 MMOL/L (ref 3–16)
AST SERPL-CCNC: 17 U/L (ref 15–37)
BACTERIA URNS QL MICRO: ABNORMAL /HPF
BASOPHILS # BLD: 0 K/UL (ref 0–0.2)
BASOPHILS NFR BLD: 0.6 %
BILIRUB SERPL-MCNC: 0.8 MG/DL (ref 0–1)
BILIRUB UR QL STRIP.AUTO: NEGATIVE
BUN SERPL-MCNC: 14 MG/DL (ref 7–20)
CALCIUM SERPL-MCNC: 9.1 MG/DL (ref 8.3–10.6)
CHLORIDE SERPL-SCNC: 101 MMOL/L (ref 99–110)
CLARITY UR: CLEAR
CO2 SERPL-SCNC: 24 MMOL/L (ref 21–32)
COLOR UR: YELLOW
CREAT SERPL-MCNC: 0.9 MG/DL (ref 0.8–1.3)
D-DIMER QUANTITATIVE: 2.33 UG/ML FEU (ref 0–0.6)
DEPRECATED RDW RBC AUTO: 14.2 % (ref 12.4–15.4)
EOSINOPHIL # BLD: 0.1 K/UL (ref 0–0.6)
EOSINOPHIL NFR BLD: 1.4 %
EPI CELLS #/AREA URNS AUTO: 0 /HPF (ref 0–5)
GFR SERPLBLD CREATININE-BSD FMLA CKD-EPI: >90 ML/MIN/{1.73_M2}
GLUCOSE SERPL-MCNC: 112 MG/DL (ref 70–99)
GLUCOSE UR STRIP.AUTO-MCNC: 250 MG/DL
HCT VFR BLD AUTO: 52.4 % (ref 40.5–52.5)
HGB BLD-MCNC: 18 G/DL (ref 13.5–17.5)
HGB UR QL STRIP.AUTO: NEGATIVE
HYALINE CASTS #/AREA URNS AUTO: 0 /LPF (ref 0–8)
KETONES UR STRIP.AUTO-MCNC: NEGATIVE MG/DL
LACTATE BLDV-SCNC: 1.3 MMOL/L (ref 0.4–1.9)
LEUKOCYTE ESTERASE UR QL STRIP.AUTO: ABNORMAL
LYMPHOCYTES # BLD: 1 K/UL (ref 1–5.1)
LYMPHOCYTES NFR BLD: 13.3 %
MCH RBC QN AUTO: 30.5 PG (ref 26–34)
MCHC RBC AUTO-ENTMCNC: 34.4 G/DL (ref 31–36)
MCV RBC AUTO: 88.6 FL (ref 80–100)
MONOCYTES # BLD: 0.6 K/UL (ref 0–1.3)
MONOCYTES NFR BLD: 8.6 %
NEUTROPHILS # BLD: 5.5 K/UL (ref 1.7–7.7)
NEUTROPHILS NFR BLD: 76.1 %
NITRITE UR QL STRIP.AUTO: POSITIVE
NT-PROBNP SERPL-MCNC: 79 PG/ML (ref 0–124)
PH UR STRIP.AUTO: 8.5 [PH] (ref 5–8)
PLATELET # BLD AUTO: 167 K/UL (ref 135–450)
PMV BLD AUTO: 8.4 FL (ref 5–10.5)
POTASSIUM SERPL-SCNC: 4 MMOL/L (ref 3.5–5.1)
PROT SERPL-MCNC: 6.9 G/DL (ref 6.4–8.2)
PROT UR STRIP.AUTO-MCNC: ABNORMAL MG/DL
RBC # BLD AUTO: 5.91 M/UL (ref 4.2–5.9)
RBC CLUMPS #/AREA URNS AUTO: 0 /HPF (ref 0–4)
SODIUM SERPL-SCNC: 136 MMOL/L (ref 136–145)
SP GR UR STRIP.AUTO: 1.05 (ref 1–1.03)
TROPONIN, HIGH SENSITIVITY: 16 NG/L (ref 0–22)
TROPONIN, HIGH SENSITIVITY: 25 NG/L (ref 0–22)
UA COMPLETE W REFLEX CULTURE PNL UR: YES
UA DIPSTICK W REFLEX MICRO PNL UR: YES
URN SPEC COLLECT METH UR: ABNORMAL
UROBILINOGEN UR STRIP-ACNC: 0.2 E.U./DL
WBC # BLD AUTO: 7.2 K/UL (ref 4–11)
WBC #/AREA URNS AUTO: 41 /HPF (ref 0–5)

## 2024-06-05 PROCEDURE — 6360000002 HC RX W HCPCS: Performed by: EMERGENCY MEDICINE

## 2024-06-05 PROCEDURE — 87186 SC STD MICRODIL/AGAR DIL: CPT

## 2024-06-05 PROCEDURE — 71045 X-RAY EXAM CHEST 1 VIEW: CPT

## 2024-06-05 PROCEDURE — 2580000003 HC RX 258: Performed by: EMERGENCY MEDICINE

## 2024-06-05 PROCEDURE — 81001 URINALYSIS AUTO W/SCOPE: CPT

## 2024-06-05 PROCEDURE — 83880 ASSAY OF NATRIURETIC PEPTIDE: CPT

## 2024-06-05 PROCEDURE — 6370000000 HC RX 637 (ALT 250 FOR IP): Performed by: EMERGENCY MEDICINE

## 2024-06-05 PROCEDURE — 94640 AIRWAY INHALATION TREATMENT: CPT

## 2024-06-05 PROCEDURE — 71260 CT THORAX DX C+: CPT

## 2024-06-05 PROCEDURE — 87077 CULTURE AEROBIC IDENTIFY: CPT

## 2024-06-05 PROCEDURE — 6360000004 HC RX CONTRAST MEDICATION: Performed by: EMERGENCY MEDICINE

## 2024-06-05 PROCEDURE — 96374 THER/PROPH/DIAG INJ IV PUSH: CPT

## 2024-06-05 PROCEDURE — 1200000000 HC SEMI PRIVATE

## 2024-06-05 PROCEDURE — 93005 ELECTROCARDIOGRAM TRACING: CPT | Performed by: EMERGENCY MEDICINE

## 2024-06-05 PROCEDURE — 87086 URINE CULTURE/COLONY COUNT: CPT

## 2024-06-05 PROCEDURE — 85379 FIBRIN DEGRADATION QUANT: CPT

## 2024-06-05 PROCEDURE — 83036 HEMOGLOBIN GLYCOSYLATED A1C: CPT

## 2024-06-05 PROCEDURE — 83605 ASSAY OF LACTIC ACID: CPT

## 2024-06-05 PROCEDURE — 80053 COMPREHEN METABOLIC PANEL: CPT

## 2024-06-05 PROCEDURE — 99285 EMERGENCY DEPT VISIT HI MDM: CPT

## 2024-06-05 PROCEDURE — 81003 URINALYSIS AUTO W/O SCOPE: CPT

## 2024-06-05 PROCEDURE — 85025 COMPLETE CBC W/AUTO DIFF WBC: CPT

## 2024-06-05 PROCEDURE — 84484 ASSAY OF TROPONIN QUANT: CPT

## 2024-06-05 RX ORDER — INSULIN LISPRO 100 [IU]/ML
0-4 INJECTION, SOLUTION INTRAVENOUS; SUBCUTANEOUS
Status: DISCONTINUED | OUTPATIENT
Start: 2024-06-06 | End: 2024-06-10 | Stop reason: HOSPADM

## 2024-06-05 RX ORDER — SODIUM CHLORIDE 0.9 % (FLUSH) 0.9 %
5-40 SYRINGE (ML) INJECTION EVERY 12 HOURS SCHEDULED
Status: DISCONTINUED | OUTPATIENT
Start: 2024-06-06 | End: 2024-06-10 | Stop reason: HOSPADM

## 2024-06-05 RX ORDER — ATORVASTATIN CALCIUM 40 MG/1
40 TABLET, FILM COATED ORAL DAILY
Status: DISCONTINUED | OUTPATIENT
Start: 2024-06-06 | End: 2024-06-08

## 2024-06-05 RX ORDER — INSULIN LISPRO 100 [IU]/ML
0-4 INJECTION, SOLUTION INTRAVENOUS; SUBCUTANEOUS NIGHTLY
Status: DISCONTINUED | OUTPATIENT
Start: 2024-06-06 | End: 2024-06-10 | Stop reason: HOSPADM

## 2024-06-05 RX ORDER — ONDANSETRON 2 MG/ML
4 INJECTION INTRAMUSCULAR; INTRAVENOUS EVERY 6 HOURS PRN
Status: DISCONTINUED | OUTPATIENT
Start: 2024-06-05 | End: 2024-06-10 | Stop reason: HOSPADM

## 2024-06-05 RX ORDER — SODIUM CHLORIDE 9 MG/ML
INJECTION, SOLUTION INTRAVENOUS PRN
Status: DISCONTINUED | OUTPATIENT
Start: 2024-06-05 | End: 2024-06-10 | Stop reason: HOSPADM

## 2024-06-05 RX ORDER — LATANOPROST 50 UG/ML
1 SOLUTION/ DROPS OPHTHALMIC NIGHTLY
Status: DISCONTINUED | OUTPATIENT
Start: 2024-06-06 | End: 2024-06-10 | Stop reason: HOSPADM

## 2024-06-05 RX ORDER — LINEZOLID 2 MG/ML
600 INJECTION, SOLUTION INTRAVENOUS ONCE
Status: COMPLETED | OUTPATIENT
Start: 2024-06-05 | End: 2024-06-06

## 2024-06-05 RX ORDER — SODIUM CHLORIDE 0.9 % (FLUSH) 0.9 %
5-40 SYRINGE (ML) INJECTION PRN
Status: DISCONTINUED | OUTPATIENT
Start: 2024-06-05 | End: 2024-06-10 | Stop reason: HOSPADM

## 2024-06-05 RX ORDER — ALBUTEROL SULFATE 2.5 MG/3ML
2.5 SOLUTION RESPIRATORY (INHALATION) EVERY 4 HOURS PRN
Status: DISCONTINUED | OUTPATIENT
Start: 2024-06-05 | End: 2024-06-10 | Stop reason: HOSPADM

## 2024-06-05 RX ORDER — HYDROXYZINE HYDROCHLORIDE 25 MG/1
25 TABLET, FILM COATED ORAL 4 TIMES DAILY PRN
Status: DISCONTINUED | OUTPATIENT
Start: 2024-06-05 | End: 2024-06-10 | Stop reason: HOSPADM

## 2024-06-05 RX ORDER — MAGNESIUM SULFATE IN WATER 40 MG/ML
2000 INJECTION, SOLUTION INTRAVENOUS PRN
Status: DISCONTINUED | OUTPATIENT
Start: 2024-06-05 | End: 2024-06-10 | Stop reason: HOSPADM

## 2024-06-05 RX ORDER — LANOLIN ALCOHOL/MO/W.PET/CERES
6 CREAM (GRAM) TOPICAL NIGHTLY PRN
Status: DISCONTINUED | OUTPATIENT
Start: 2024-06-05 | End: 2024-06-10 | Stop reason: HOSPADM

## 2024-06-05 RX ORDER — DICYCLOMINE HCL 20 MG
20 TABLET ORAL 4 TIMES DAILY
Status: DISCONTINUED | OUTPATIENT
Start: 2024-06-06 | End: 2024-06-10 | Stop reason: HOSPADM

## 2024-06-05 RX ORDER — METHOCARBAMOL 750 MG/1
750 TABLET, FILM COATED ORAL 2 TIMES DAILY PRN
Status: DISCONTINUED | OUTPATIENT
Start: 2024-06-05 | End: 2024-06-10 | Stop reason: HOSPADM

## 2024-06-05 RX ORDER — NICOTINE 21 MG/24HR
1 PATCH, TRANSDERMAL 24 HOURS TRANSDERMAL DAILY
Status: DISCONTINUED | OUTPATIENT
Start: 2024-06-06 | End: 2024-06-05

## 2024-06-05 RX ORDER — NICOTINE 21 MG/24HR
1 PATCH, TRANSDERMAL 24 HOURS TRANSDERMAL DAILY
Status: DISCONTINUED | OUTPATIENT
Start: 2024-06-06 | End: 2024-06-10 | Stop reason: HOSPADM

## 2024-06-05 RX ORDER — LANOLIN ALCOHOL/MO/W.PET/CERES
CREAM (GRAM) TOPICAL 2 TIMES DAILY
Status: DISCONTINUED | OUTPATIENT
Start: 2024-06-06 | End: 2024-06-10 | Stop reason: HOSPADM

## 2024-06-05 RX ORDER — POTASSIUM CHLORIDE 20 MEQ/1
40 TABLET, EXTENDED RELEASE ORAL PRN
Status: DISCONTINUED | OUTPATIENT
Start: 2024-06-05 | End: 2024-06-10 | Stop reason: HOSPADM

## 2024-06-05 RX ORDER — POTASSIUM CHLORIDE 7.45 MG/ML
10 INJECTION INTRAVENOUS PRN
Status: DISCONTINUED | OUTPATIENT
Start: 2024-06-05 | End: 2024-06-10 | Stop reason: HOSPADM

## 2024-06-05 RX ORDER — GLUCAGON 1 MG/ML
1 KIT INJECTION PRN
Status: DISCONTINUED | OUTPATIENT
Start: 2024-06-05 | End: 2024-06-10 | Stop reason: HOSPADM

## 2024-06-05 RX ORDER — LISINOPRIL 10 MG/1
10 TABLET ORAL DAILY
Status: DISCONTINUED | OUTPATIENT
Start: 2024-06-06 | End: 2024-06-10 | Stop reason: HOSPADM

## 2024-06-05 RX ORDER — IPRATROPIUM BROMIDE AND ALBUTEROL SULFATE 2.5; .5 MG/3ML; MG/3ML
1 SOLUTION RESPIRATORY (INHALATION) ONCE
Status: COMPLETED | OUTPATIENT
Start: 2024-06-05 | End: 2024-06-05

## 2024-06-05 RX ORDER — ACETAMINOPHEN 325 MG/1
650 TABLET ORAL EVERY 6 HOURS PRN
Status: DISCONTINUED | OUTPATIENT
Start: 2024-06-05 | End: 2024-06-10 | Stop reason: HOSPADM

## 2024-06-05 RX ORDER — DEXTROSE MONOHYDRATE 100 MG/ML
INJECTION, SOLUTION INTRAVENOUS CONTINUOUS PRN
Status: DISCONTINUED | OUTPATIENT
Start: 2024-06-05 | End: 2024-06-10 | Stop reason: HOSPADM

## 2024-06-05 RX ORDER — POLYETHYLENE GLYCOL 3350 17 G/17G
17 POWDER, FOR SOLUTION ORAL DAILY PRN
Status: DISCONTINUED | OUTPATIENT
Start: 2024-06-05 | End: 2024-06-10 | Stop reason: HOSPADM

## 2024-06-05 RX ORDER — ONDANSETRON 4 MG/1
4 TABLET, ORALLY DISINTEGRATING ORAL EVERY 8 HOURS PRN
Status: DISCONTINUED | OUTPATIENT
Start: 2024-06-05 | End: 2024-06-10 | Stop reason: HOSPADM

## 2024-06-05 RX ORDER — NICOTINE 21 MG/24HR
1 PATCH, TRANSDERMAL 24 HOURS TRANSDERMAL DAILY
Status: DISCONTINUED | OUTPATIENT
Start: 2024-06-05 | End: 2024-06-05

## 2024-06-05 RX ORDER — ACETAMINOPHEN 650 MG/1
650 SUPPOSITORY RECTAL EVERY 6 HOURS PRN
Status: DISCONTINUED | OUTPATIENT
Start: 2024-06-05 | End: 2024-06-10 | Stop reason: HOSPADM

## 2024-06-05 RX ORDER — LEVOTHYROXINE SODIUM 0.05 MG/1
50 TABLET ORAL DAILY
Status: DISCONTINUED | OUTPATIENT
Start: 2024-06-06 | End: 2024-06-10 | Stop reason: HOSPADM

## 2024-06-05 RX ORDER — FUROSEMIDE 20 MG/1
20 TABLET ORAL DAILY
Status: DISCONTINUED | OUTPATIENT
Start: 2024-06-06 | End: 2024-06-08

## 2024-06-05 RX ADMIN — PIPERACILLIN AND TAZOBACTAM 3375 MG: 3; .375 INJECTION, POWDER, LYOPHILIZED, FOR SOLUTION INTRAVENOUS at 21:34

## 2024-06-05 RX ADMIN — IOPAMIDOL 75 ML: 755 INJECTION, SOLUTION INTRAVENOUS at 19:29

## 2024-06-05 RX ADMIN — IPRATROPIUM BROMIDE AND ALBUTEROL SULFATE 1 DOSE: 2.5; .5 SOLUTION RESPIRATORY (INHALATION) at 18:27

## 2024-06-05 RX ADMIN — LINEZOLID 600 MG: 600 INJECTION, SOLUTION INTRAVENOUS at 22:50

## 2024-06-05 ASSESSMENT — HEART SCORE: ECG: NON-SPECIFC REPOLARIZATION DISTURBANCE/LBTB/PM

## 2024-06-05 ASSESSMENT — PAIN - FUNCTIONAL ASSESSMENT: PAIN_FUNCTIONAL_ASSESSMENT: 0-10

## 2024-06-05 ASSESSMENT — PAIN DESCRIPTION - LOCATION: LOCATION: CHEST

## 2024-06-05 ASSESSMENT — PAIN SCALES - GENERAL: PAINLEVEL_OUTOF10: 5

## 2024-06-05 ASSESSMENT — PAIN DESCRIPTION - DESCRIPTORS: DESCRIPTORS: PRESSURE

## 2024-06-05 ASSESSMENT — PAIN DESCRIPTION - PAIN TYPE: TYPE: ACUTE PAIN

## 2024-06-05 ASSESSMENT — LIFESTYLE VARIABLES
HOW MANY STANDARD DRINKS CONTAINING ALCOHOL DO YOU HAVE ON A TYPICAL DAY: PATIENT DOES NOT DRINK
HOW OFTEN DO YOU HAVE A DRINK CONTAINING ALCOHOL: NEVER

## 2024-06-05 NOTE — ED TRIAGE NOTES
Pt into ER from home via EMS with c/c Chest pressure with dizziness and SOB, onset a couple hours ago after a verbal altercation.  Pt also c/o left lower leg throbbing pain, left hand tingling.  Pt with a Hx of CHF and increased swelling in lower legs over last few days.
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
## 2024-06-06 ENCOUNTER — HOSPITAL ENCOUNTER (INPATIENT)
Dept: VASCULAR LAB | Age: 69
Discharge: HOME | DRG: 638 | End: 2024-06-08
Attending: STUDENT IN AN ORGANIZED HEALTH CARE EDUCATION/TRAINING PROGRAM
Payer: MEDICARE

## 2024-06-06 ENCOUNTER — APPOINTMENT (OUTPATIENT)
Dept: MRI IMAGING | Age: 69
DRG: 638 | End: 2024-06-06
Payer: MEDICARE

## 2024-06-06 PROBLEM — R93.89 ABNORMAL CT OF THE CHEST: Status: ACTIVE | Noted: 2024-06-06

## 2024-06-06 PROBLEM — G47.34 NOCTURNAL HYPOXIA: Status: ACTIVE | Noted: 2024-06-06

## 2024-06-06 LAB
ALBUMIN SERPL-MCNC: 3.6 G/DL (ref 3.4–5)
ALBUMIN/GLOB SERPL: 1.2 {RATIO} (ref 1.1–2.2)
ALP SERPL-CCNC: 106 U/L (ref 40–129)
ALT SERPL-CCNC: 17 U/L (ref 10–40)
ANION GAP SERPL CALCULATED.3IONS-SCNC: 13 MMOL/L (ref 3–16)
AST SERPL-CCNC: 17 U/L (ref 15–37)
BASOPHILS # BLD: 0.1 K/UL (ref 0–0.2)
BASOPHILS NFR BLD: 0.6 %
BILIRUB SERPL-MCNC: 0.8 MG/DL (ref 0–1)
BUN SERPL-MCNC: 14 MG/DL (ref 7–20)
CALCIUM SERPL-MCNC: 8.7 MG/DL (ref 8.3–10.6)
CHLORIDE SERPL-SCNC: 103 MMOL/L (ref 99–110)
CO2 SERPL-SCNC: 20 MMOL/L (ref 21–32)
CREAT SERPL-MCNC: 0.8 MG/DL (ref 0.8–1.3)
DEPRECATED RDW RBC AUTO: 14.5 % (ref 12.4–15.4)
ECHO BSA: 2.72 M2
EKG ATRIAL RATE: 103 BPM
EKG DIAGNOSIS: NORMAL
EKG P AXIS: 54 DEGREES
EKG P-R INTERVAL: 206 MS
EKG Q-T INTERVAL: 340 MS
EKG QRS DURATION: 76 MS
EKG QTC CALCULATION (BAZETT): 445 MS
EKG R AXIS: 15 DEGREES
EKG T AXIS: 58 DEGREES
EKG VENTRICULAR RATE: 103 BPM
EOSINOPHIL # BLD: 0.2 K/UL (ref 0–0.6)
EOSINOPHIL NFR BLD: 1.9 %
EST. AVERAGE GLUCOSE BLD GHB EST-MCNC: 116.9 MG/DL
GFR SERPLBLD CREATININE-BSD FMLA CKD-EPI: >90 ML/MIN/{1.73_M2}
GLUCOSE BLD-MCNC: 100 MG/DL (ref 70–99)
GLUCOSE BLD-MCNC: 108 MG/DL (ref 70–99)
GLUCOSE BLD-MCNC: 110 MG/DL (ref 70–99)
GLUCOSE BLD-MCNC: 112 MG/DL (ref 70–99)
GLUCOSE BLD-MCNC: 114 MG/DL (ref 70–99)
GLUCOSE BLD-MCNC: 71 MG/DL (ref 70–99)
GLUCOSE SERPL-MCNC: 80 MG/DL (ref 70–99)
HBA1C MFR BLD: 5.7 %
HCT VFR BLD AUTO: 51.6 % (ref 40.5–52.5)
HGB BLD-MCNC: 17.2 G/DL (ref 13.5–17.5)
LYMPHOCYTES # BLD: 1.8 K/UL (ref 1–5.1)
LYMPHOCYTES NFR BLD: 19.8 %
MCH RBC QN AUTO: 29.7 PG (ref 26–34)
MCHC RBC AUTO-ENTMCNC: 33.4 G/DL (ref 31–36)
MCV RBC AUTO: 89 FL (ref 80–100)
MONOCYTES # BLD: 0.9 K/UL (ref 0–1.3)
MONOCYTES NFR BLD: 9.9 %
NEUTROPHILS # BLD: 6.1 K/UL (ref 1.7–7.7)
NEUTROPHILS NFR BLD: 67.8 %
PERFORMED ON: ABNORMAL
PERFORMED ON: NORMAL
PLATELET # BLD AUTO: 165 K/UL (ref 135–450)
PMV BLD AUTO: 8.3 FL (ref 5–10.5)
POTASSIUM SERPL-SCNC: 4 MMOL/L (ref 3.5–5.1)
PROCALCITONIN SERPL IA-MCNC: 0.03 NG/ML (ref 0–0.15)
PROT SERPL-MCNC: 6.5 G/DL (ref 6.4–8.2)
RBC # BLD AUTO: 5.8 M/UL (ref 4.2–5.9)
SODIUM SERPL-SCNC: 136 MMOL/L (ref 136–145)
TSH SERPL DL<=0.005 MIU/L-ACNC: 1.76 UIU/ML (ref 0.27–4.2)
WBC # BLD AUTO: 9 K/UL (ref 4–11)

## 2024-06-06 PROCEDURE — 6370000000 HC RX 637 (ALT 250 FOR IP): Performed by: STUDENT IN AN ORGANIZED HEALTH CARE EDUCATION/TRAINING PROGRAM

## 2024-06-06 PROCEDURE — 93970 EXTREMITY STUDY: CPT | Performed by: SURGERY

## 2024-06-06 PROCEDURE — 84443 ASSAY THYROID STIM HORMONE: CPT

## 2024-06-06 PROCEDURE — 72141 MRI NECK SPINE W/O DYE: CPT

## 2024-06-06 PROCEDURE — 6360000002 HC RX W HCPCS: Performed by: HOSPITALIST

## 2024-06-06 PROCEDURE — 97162 PT EVAL MOD COMPLEX 30 MIN: CPT

## 2024-06-06 PROCEDURE — 9990000010 HC NO CHARGE VISIT

## 2024-06-06 PROCEDURE — 97530 THERAPEUTIC ACTIVITIES: CPT

## 2024-06-06 PROCEDURE — 1200000000 HC SEMI PRIVATE

## 2024-06-06 PROCEDURE — 36415 COLL VENOUS BLD VENIPUNCTURE: CPT

## 2024-06-06 PROCEDURE — 85025 COMPLETE CBC W/AUTO DIFF WBC: CPT

## 2024-06-06 PROCEDURE — 2580000003 HC RX 258: Performed by: STUDENT IN AN ORGANIZED HEALTH CARE EDUCATION/TRAINING PROGRAM

## 2024-06-06 PROCEDURE — 94640 AIRWAY INHALATION TREATMENT: CPT

## 2024-06-06 PROCEDURE — 2580000003 HC RX 258: Performed by: HOSPITALIST

## 2024-06-06 PROCEDURE — 99223 1ST HOSP IP/OBS HIGH 75: CPT | Performed by: INTERNAL MEDICINE

## 2024-06-06 PROCEDURE — 93970 EXTREMITY STUDY: CPT

## 2024-06-06 PROCEDURE — 87641 MR-STAPH DNA AMP PROBE: CPT

## 2024-06-06 PROCEDURE — 84145 PROCALCITONIN (PCT): CPT

## 2024-06-06 PROCEDURE — 94760 N-INVAS EAR/PLS OXIMETRY 1: CPT

## 2024-06-06 PROCEDURE — 80053 COMPREHEN METABOLIC PANEL: CPT

## 2024-06-06 PROCEDURE — 93010 ELECTROCARDIOGRAM REPORT: CPT | Performed by: INTERNAL MEDICINE

## 2024-06-06 PROCEDURE — 6370000000 HC RX 637 (ALT 250 FOR IP): Performed by: NURSE PRACTITIONER

## 2024-06-06 PROCEDURE — 6360000002 HC RX W HCPCS: Performed by: STUDENT IN AN ORGANIZED HEALTH CARE EDUCATION/TRAINING PROGRAM

## 2024-06-06 PROCEDURE — 6370000000 HC RX 637 (ALT 250 FOR IP): Performed by: INTERNAL MEDICINE

## 2024-06-06 PROCEDURE — 92610 EVALUATE SWALLOWING FUNCTION: CPT

## 2024-06-06 PROCEDURE — 97166 OT EVAL MOD COMPLEX 45 MIN: CPT

## 2024-06-06 RX ORDER — GABAPENTIN 300 MG/1
300 CAPSULE ORAL 2 TIMES DAILY
Status: DISCONTINUED | OUTPATIENT
Start: 2024-06-06 | End: 2024-06-10 | Stop reason: HOSPADM

## 2024-06-06 RX ORDER — AZITHROMYCIN 250 MG/1
250 TABLET, FILM COATED ORAL DAILY
Status: COMPLETED | OUTPATIENT
Start: 2024-06-07 | End: 2024-06-10

## 2024-06-06 RX ORDER — AZITHROMYCIN 500 MG/1
500 TABLET, FILM COATED ORAL DAILY
Status: COMPLETED | OUTPATIENT
Start: 2024-06-06 | End: 2024-06-06

## 2024-06-06 RX ORDER — VANCOMYCIN 1.75 G/350ML
1250 INJECTION, SOLUTION INTRAVENOUS EVERY 12 HOURS
Status: DISCONTINUED | OUTPATIENT
Start: 2024-06-06 | End: 2024-06-07 | Stop reason: ALTCHOICE

## 2024-06-06 RX ADMIN — GABAPENTIN 300 MG: 300 CAPSULE ORAL at 04:03

## 2024-06-06 RX ADMIN — Medication: at 22:48

## 2024-06-06 RX ADMIN — LISINOPRIL 10 MG: 10 TABLET ORAL at 09:41

## 2024-06-06 RX ADMIN — FUROSEMIDE 20 MG: 20 TABLET ORAL at 09:41

## 2024-06-06 RX ADMIN — VANCOMYCIN 1250 MG: 1.75 INJECTION, SOLUTION INTRAVENOUS at 01:15

## 2024-06-06 RX ADMIN — Medication: at 11:02

## 2024-06-06 RX ADMIN — TIOTROPIUM BROMIDE INHALATION SPRAY 2 PUFF: 3.12 SPRAY, METERED RESPIRATORY (INHALATION) at 09:09

## 2024-06-06 RX ADMIN — LATANOPROST 1 DROP: 50 SOLUTION OPHTHALMIC at 20:49

## 2024-06-06 RX ADMIN — CEFEPIME 2000 MG: 2 INJECTION, POWDER, FOR SOLUTION INTRAVENOUS at 09:40

## 2024-06-06 RX ADMIN — APIXABAN 5 MG: 5 TABLET, FILM COATED ORAL at 09:41

## 2024-06-06 RX ADMIN — LATANOPROST 1 DROP: 50 SOLUTION OPHTHALMIC at 01:19

## 2024-06-06 RX ADMIN — Medication: at 01:15

## 2024-06-06 RX ADMIN — APIXABAN 5 MG: 5 TABLET, FILM COATED ORAL at 20:49

## 2024-06-06 RX ADMIN — APIXABAN 5 MG: 5 TABLET, FILM COATED ORAL at 01:29

## 2024-06-06 RX ADMIN — METHOCARBAMOL TABLETS 750 MG: 750 TABLET, COATED ORAL at 01:29

## 2024-06-06 RX ADMIN — DICYCLOMINE HYDROCHLORIDE 20 MG: 20 TABLET ORAL at 20:49

## 2024-06-06 RX ADMIN — CEFEPIME 2000 MG: 2 INJECTION, POWDER, FOR SOLUTION INTRAVENOUS at 18:34

## 2024-06-06 RX ADMIN — MOMETASONE FUROATE AND FORMOTEROL FUMARATE DIHYDRATE 2 PUFF: 200; 5 AEROSOL RESPIRATORY (INHALATION) at 21:04

## 2024-06-06 RX ADMIN — AZITHROMYCIN 500 MG: 500 TABLET, FILM COATED ORAL at 16:57

## 2024-06-06 RX ADMIN — ATORVASTATIN CALCIUM 40 MG: 40 TABLET, FILM COATED ORAL at 09:41

## 2024-06-06 RX ADMIN — DICYCLOMINE HYDROCHLORIDE 20 MG: 20 TABLET ORAL at 01:29

## 2024-06-06 RX ADMIN — MOMETASONE FUROATE AND FORMOTEROL FUMARATE DIHYDRATE 2 PUFF: 200; 5 AEROSOL RESPIRATORY (INHALATION) at 09:09

## 2024-06-06 RX ADMIN — VANCOMYCIN 1250 MG: 1.75 INJECTION, SOLUTION INTRAVENOUS at 14:21

## 2024-06-06 RX ADMIN — DICYCLOMINE HYDROCHLORIDE 20 MG: 20 TABLET ORAL at 09:41

## 2024-06-06 RX ADMIN — SODIUM CHLORIDE, PRESERVATIVE FREE 10 ML: 5 INJECTION INTRAVENOUS at 09:45

## 2024-06-06 RX ADMIN — DICYCLOMINE HYDROCHLORIDE 20 MG: 20 TABLET ORAL at 14:20

## 2024-06-06 RX ADMIN — GABAPENTIN 300 MG: 300 CAPSULE ORAL at 20:49

## 2024-06-06 RX ADMIN — LEVOTHYROXINE SODIUM 50 MCG: 0.05 TABLET ORAL at 05:57

## 2024-06-06 RX ADMIN — HYDROXYZINE HYDROCHLORIDE 25 MG: 25 TABLET, FILM COATED ORAL at 01:34

## 2024-06-06 ASSESSMENT — PAIN - FUNCTIONAL ASSESSMENT
PAIN_FUNCTIONAL_ASSESSMENT: PREVENTS OR INTERFERES SOME ACTIVE ACTIVITIES AND ADLS
PAIN_FUNCTIONAL_ASSESSMENT: PREVENTS OR INTERFERES SOME ACTIVE ACTIVITIES AND ADLS

## 2024-06-06 ASSESSMENT — PAIN SCALES - GENERAL
PAINLEVEL_OUTOF10: 5
PAINLEVEL_OUTOF10: 5
PAINLEVEL_OUTOF10: 0
PAINLEVEL_OUTOF10: 3

## 2024-06-06 ASSESSMENT — PAIN DESCRIPTION - ONSET
ONSET: ON-GOING
ONSET: ON-GOING

## 2024-06-06 ASSESSMENT — PAIN DESCRIPTION - LOCATION
LOCATION: NECK
LOCATION: ABDOMEN
LOCATION: ABDOMEN

## 2024-06-06 ASSESSMENT — PAIN DESCRIPTION - DESCRIPTORS
DESCRIPTORS: CRAMPING
DESCRIPTORS: CRAMPING

## 2024-06-06 ASSESSMENT — PAIN DESCRIPTION - PAIN TYPE
TYPE: ACUTE PAIN
TYPE: ACUTE PAIN

## 2024-06-06 ASSESSMENT — PAIN DESCRIPTION - FREQUENCY
FREQUENCY: CONTINUOUS
FREQUENCY: CONTINUOUS

## 2024-06-06 ASSESSMENT — PAIN DESCRIPTION - ORIENTATION
ORIENTATION: RIGHT;LEFT
ORIENTATION: RIGHT;LEFT

## 2024-06-06 NOTE — PROGRESS NOTES
Patient requesting home medication be ordered while IP. Perfect serve message sent to on call NP, Maru Yu, making her aware of patient's request. Awaiting response.

## 2024-06-06 NOTE — PROGRESS NOTES
Patient is resting in chair, awake and quiet. Fall precautions are in place. Patient is UAL. Patient calls appropriately. Call light, telephone and beside table are within reach.Will continue to monitor patient per unit protocols. Electronically signed by Laquita Schmid RN on 6/6/2024 at 6:46 PM

## 2024-06-06 NOTE — PLAN OF CARE
Problem: Discharge Planning  Goal: Discharge to home or other facility with appropriate resources  Outcome: Progressing  Flowsheets (Taken 6/6/2024 0244)  Discharge to home or other facility with appropriate resources:   Identify barriers to discharge with patient and caregiver   Identify discharge learning needs (meds, wound care, etc)   Arrange for needed discharge resources and transportation as appropriate     Problem: Pain  Goal: Verbalizes/displays adequate comfort level or baseline comfort level  Outcome: Progressing  Flowsheets (Taken 6/6/2024 0244)  Verbalizes/displays adequate comfort level or baseline comfort level:   Encourage patient to monitor pain and request assistance   Administer analgesics based on type and severity of pain and evaluate response   Assess pain using appropriate pain scale   Implement non-pharmacological measures as appropriate and evaluate response     Problem: Safety - Adult  Goal: Free from fall injury  Outcome: Progressing  Flowsheets (Taken 6/6/2024 0244)  Free From Fall Injury: Instruct family/caregiver on patient safety

## 2024-06-06 NOTE — PROGRESS NOTES
Patient was admitted at 2350 to room 3123 via walker. Pt oriented to room, call light, policies and procedures, the menu and ordering. Call light within reach. Bed in lowest position, bed alarm on, and wheels locked. Pt verbalized understanding. No complaints, questions, or concerns at this time.

## 2024-06-06 NOTE — ED NOTES
Report called to CATARINA Goodwin. MONIQUE discussed. All questions answered. RN verbalized understanding.

## 2024-06-06 NOTE — PROGRESS NOTES
Attempted to see patient.  Not in room    DO [facility]  [phone]
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
## 2024-06-06 NOTE — PROGRESS NOTES
Clinical Pharmacy Note  Vancomycin Consult    Calixto Steen is a 68 y.o. male ordered vancomycin for SSTI; consult received from Dr. Mayorga to manage therapy. Also receiving cefepime.    Allergies:  Patient has no known allergies.     Temp max:  Temp (24hrs), Av °F (36.7 °C), Min:97.5 °F (36.4 °C), Max:98.4 °F (36.9 °C)      Recent Labs     24  1823   WBC 7.2       Recent Labs     24  1823   BUN 14   CREATININE 0.9         Intake/Output Summary (Last 24 hours) at 2024 0012  Last data filed at 2024 1915  Gross per 24 hour   Intake --   Output 300 ml   Net -300 ml       Culture Results:  Pending - Hx of MRSA 22    Ht Readings from Last 1 Encounters:   24 1.778 m (5' 10\")        Wt Readings from Last 1 Encounters:   24 (!) 150 kg (330 lb 9.6 oz)         Estimated Creatinine Clearance: 114 mL/min (based on SCr of 0.9 mg/dL).    Assessment/Plan:  Day # 1 of vancomycin.  Vancomycin 1250 mg IV every 12 hours.    Goal -600  Predicted     Vanc random 24 1100    Thank you for the consult.     Jamilah Hardin, PharmD

## 2024-06-06 NOTE — PLAN OF CARE
Problem: Discharge Planning  Goal: Discharge to home or other facility with appropriate resources  6/6/2024 0733 by Laquita Schmid, RN  Outcome: Progressing   Assessed patients knowledge of discharge.  Will continue to work with patient on discharge planning and answer patient questions. Will consult case management and  as necessary.   Problem: Pain  Goal: Verbalizes/displays adequate comfort level or baseline comfort level  6/6/2024 0733 by Laquita Schmid, RN  Outcome: Progressing   Patient educated on acute pain.  Taught patient to use call light to ask for pain medication.  PRN pain medication given for acute pain.  Will continue to monitor pain per unit protocol.    Problem: Safety - Adult  Goal: Free from fall injury  6/6/2024 0733 by Laquita Schmid, RN  Outcome: Progressing   Will remain free from falls. Fall precautions are in place. Call light, telephone and bedside table are within reach.

## 2024-06-06 NOTE — PROGRESS NOTES
4 Eyes Skin Assessment     NAME:  Calixto Steen  YOB: 1955  MEDICAL RECORD NUMBER:  [id number]    The patient is being assessed for  Admission    I agree that at least one RN has performed a thorough Head to Toe Skin Assessment on the patient. ALL assessment sites listed below have been assessed.      Areas assessed by both nurses:    Head, Face, Ears, Shoulders, Back, Chest, Arms, Elbows, Hands, Sacrum. Buttock, Coccyx, Ischium, Legs. Feet and Heels, and Under Medical Devices         Does the Patient have a Wound? No noted wound(s)       Gianni Prevention initiated by RN: No  Wound Care Orders initiated by RN: No    Pressure Injury (Stage 3,4, Unstageable, DTI, NWPT, and Complex wounds) if present, place Wound referral order by RN under : No    New Ostomies, if present place, Ostomy referral order under : No     Nurse 1 eSignature: Electronically signed by Christa Leach RN on 6/6/24 at 2:39 AM EDT    **SHARE this note so that the co-signing nurse can place an eSignature**    Nurse 2 eSignature: Electronically signed by Marely Mane RN on 6/6/24 at 5:25 AM EDT
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
## 2024-06-06 NOTE — H&P
V2.0  History and Physical      Name:  Calixto Steen /Age/Sex: 1955  (68 y.o. male)   MRN & CSN:  [id number] & [id number] Encounter Date/Time: 2024 11:51 PM EDT   Location:   PCP: Mayi Camarena PA       Hospital Day: 1    Assessment and Plan:   Calixto Steen is a 68 y.o. male with pmh of chronic lymphedema, hypertension, diabetes mellitus, diabetic neuropathy, DVT/PE on Eliquis, COPD, tobacco dependence, hypothyroidism, previous CVA who presents to the emergency department with complaints of chest pain, left lower extremity swelling  Hospital Problems             Last Modified POA    * (Principal) Cellulitis of left lower extremity 2024 Yes       Left lower extremity cellulitis  Dizziness  Dysphagia  Chest pain  Hypertension  Diabetes mellitus type 2  History of DVT/PE on Eliquis  COPD  Tobacco dependence  LISA  Hypothyroidism  Previous CVA  - Left lower extremity swelling, tenderness to palpation, warm to touch.  Will obtain blood cultures, procalcitonin, MRSA PCR, lower extremity venous Dopplers.  CT chest shows hazy bilateral upper lobe opacities.  Placed on IV vancomycin and cefepime.  - Dizziness associated with neck pain, concern for cervical radiculopathy.  Will get MRI cervical spine, check orthostatic vital signs, PT/OT consulted.  - Patient reports occasional oropharyngeal dysphagia, will consult SLP  - Chest pain, shortness of breath likely due to anxiety, now resolved.  EKG initially showed sinus tachycardia, now improved.  Monitor on telemetry, as needed hydroxyzine.  - Continue lisinopril, Lasix  - Low sliding scale insulin 3 times daily ACHS, check A1c  - Continue home Eliquis  - Does not appear to be in acute exacerbation, placed on as needed albuterol, Dulera, Spiriva  - Counseled on tobacco cessation, nicotine patch ordered  - Unable to tolerate CPAP, will place on oxygen nightly  -Continue rest of chronic home meds    Disposition:   Current Living situation:
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
## 2024-06-06 NOTE — PROGRESS NOTES
Primary RN, Christa, stated that patient had home medications in his belongings and refused to turn them over to staff to be sent to pharmacy.     This RN into room to discuss hospital policy regarding home medications. Patient still refusing to have medications removed from room, but is agreeable for us to place them in the locked safe in the room. Patient states during previous admissions his medications were not returned to him at discharge and he wants to avoid that situation this time. Nursing supervisor, María, made aware of patient's refusal and compromise made to place medications in safe.

## 2024-06-06 NOTE — CONSULTS
PATIENT IS SEEN AT THE REQUEST OF DR. Rosen for pneumonia    HISTORY OF PRESENT ILLNESS:  This is a 68 y.o. male who presented to the ED on 6/5 with a CC of chest pain, dizziness and SOB.  Per ED notes started after a verbal altercation with pedro.  He was admitted for chest pain .  He said he was angry and heightened, then started to have chest pains, dizziness and felt like he was having a stroke.  He feels better today.  Admits to having COPD.  He uses trelegy and albuterol.  Uses oxygen at night but has LISA>  He is not tolerant to the CPAP mask.  He smokes cigars up to 60 per day.  He does inhale them. Things are better today     Established Pulmonologist:  None    PAST MEDICAL HISTORY:  Past Medical History:   Diagnosis Date    Cellulitis 09/21/2023    Cerebrovascular accident (HCC) 08/05/2022    Chest pain 01/01/2023    Chest pain, rule out acute myocardial infarction 12/30/2022    Chronic deep vein thrombosis (DVT) of femoral vein of left lower extremity (HCC) 08/21/2020    Chronic diastolic CHF (congestive heart failure) (HCC)     DM (diabetes mellitus), type 2 (HCC)     HTN (hypertension)     Neutrophilia 09/22/2023    Pain of right lower extremity 09/22/2023    Sleep apnea        PAST SURGICAL HISTORY:  History reviewed. No pertinent surgical history.    FAMILY HISTORY:  Father had cirrhosis     SOCIAL HISTORY:  He actively smokes and inhalers cigars.  60 per day.  No ETOH or drugs.  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    Scheduled Meds:   mometasone-formoterol  2 puff Inhalation BID RT    And    tiotropium  2 puff Inhalation Daily RT    vancomycin (VANCOCIN) intermittent dosing (placeholder)   Other RX Placeholder    vancomycin  1,250 mg IntraVENous Q12H    gabapentin  300 mg Oral BID    cefepime  2,000 mg IntraVENous Q8H    apixaban  5 mg Oral BID    atorvastatin  40 mg Oral Daily    dicyclomine  20 mg Oral 4x Daily    latanoprost  1 drop Both Eyes Nightly    levothyroxine  50 mcg Oral Daily     no clubbing    LABS:  CBC:   Recent Labs     06/05/24  1823 06/06/24  0356   WBC 7.2 9.0   HGB 18.0* 17.2   HCT 52.4 51.6   MCV 88.6 89.0    165     BMP:   Recent Labs     06/05/24 1823 06/06/24  0356    136   K 4.0 4.0    103   CO2 24 20*   BUN 14 14   CREATININE 0.9 0.8     LIVER PROFILE:   Recent Labs     06/05/24 1823 06/06/24  0356   AST 17 17   ALT 16 17   BILITOT 0.8 0.8   ALKPHOS 114 106     PT/INR: No results for input(s): \"PROTIME\", \"INR\" in the last 72 hours.  APTT: No results for input(s): \"APTT\" in the last 72 hours.  UA:  Recent Labs     06/05/24  2125   COLORU Yellow   PHUR 8.5*   WBCUA 41*   RBCUA 0   BACTERIA 4+*   CLARITYU Clear   LEUKOCYTESUR MODERATE*   UROBILINOGEN 0.2   BILIRUBINUR Negative   BLOODU Negative   GLUCOSEU 250*     No results for input(s): \"PHART\", \"PMW7AOW\", \"PO2ART\" in the last 72 hours.    Cultures:   None    PFTs:     None    ECHO: 9/23   Normal left ventricle size, wall thickness, and systolic function with an   estimated ejection fraction of 55-60%. No regional wall motion abnormalities   are seen.   The right ventricle is not well visualized but appears grossly normal in   size and function.   No significant valve abnormalities noted with poor visualization.    ABG:  None    Chest CT:  Chest imaging was reviewed by me and showed mild scattered areas of ground glass.  May have small areas of emphysema    I reviewed all the above labs and studies pertaining to this visit.      ASSESSMENT/PLAN:  Abnormal CT Chest with findings suggestive of inflammation (possibly due to heavy smoking)  Add zpak for anti-inflammatory properties and atypical coverage  PRN albuterol  Continue Dulera and Spiriva as substitute for Trelegy  Solumedrol to prednisone   COPD without exacerbation   See above   Nocturnal Hypoxia  Uses oxygen to sleep with  Intolerant to PAP therapy   Tobacco Abuse   Recommend cessation     DVT prophylaxis  Eliquis     Can be discharged from my  standpoint at any time     Kenny Guo, DO [facility]

## 2024-06-06 NOTE — PROGRESS NOTES
Medication Reconciliation    List of medications patient is currently taking is complete.     Source of information: 1. Conversation with patient at bedside                                      2. EPIC records      Allergies  Patient has no known allergies.     Notes regarding home medications:   1. Patient received all of his morning home medications prior to arrival to the emergency department.    2. Patient is on Eliquis 5mg tablets taking 1 T BID, last dose taken this AM.    3. Patient states he takes Trulicity 3mg injections once weekly on Fridays and states he can patient supply if needed.     Elizabeth Vee, Pharmacy Intern  6/5/2024 10:38 PM

## 2024-06-06 NOTE — PROGRESS NOTES
Hospitalist Progress Note  6/6/2024 8:18 AM    PCP: Mayi Camarena PA    [phone]     Date of Admission: 6/5/2024                                                                                                                     HOSPITAL COURSE    Patient demographics:  The patient  Calixto Steen is a 68 y.o. male     Significant past medical history:   Patient Active Problem List   Diagnosis    Urinary tract infection without hematuria    Morbid obesity with BMI of 40.0-44.9, adult ([facility])    Anticoagulated    LISA (obstructive sleep apnea)    Uncontrolled type 2 diabetes mellitus with hyperglycemia (HCC)    Chronic diastolic heart failure (HCC)    PAF (paroxysmal atrial fibrillation) ([facility])    Tobacco abuse    Cervical radiculopathy    COPD (chronic obstructive pulmonary disease) ([facility])    Coronary atherosclerosis    Lymphedema of both lower extremities    Glaucoma    Hypothyroidism due to acquired atrophy of thyroid    Mixed hyperlipidemia    Primary hypertension    Chronic congestive heart failure ([facility])    Cellulitis of left lower extremity         Presenting symptoms:      Diagnostic workup:      CONSULTS DURING ADMISSION :   PHARMACY TO DOSE VANCOMYCIN  IP CONSULT TO HOSPITALIST  PHARMACY TO DOSE VANCOMYCIN  IP CONSULT TO SOCIAL WORK      Patient was diagnosed with:        Treatment while inpatient:                                                                                         ----------------------------------------------------------      SUBJECTIVE COMPLAINTS-     Diet: ADULT DIET; Regular      OBJECTIVE:   Patient Active Problem List   Diagnosis    Urinary tract infection without hematuria    Morbid obesity with BMI of 40.0-44.9, adult (HCC)    Anticoagulated    LISA (obstructive sleep apnea)    Uncontrolled type 2 diabetes mellitus with hyperglycemia (HCC)    Chronic diastolic heart failure (HCC)    PAF (paroxysmal atrial fibrillation) ([facility])    Tobacco abuse    Cervical radiculopathy    COPD  filed at 6/6/2024 0601  Gross per 24 hour   Intake 900 ml   Output 302 ml   Net 598 ml       Exam:    Gen:   Alert and oriented ×3    Eyes: PERRL. No sclera icterus. No conjunctival injection.   ENT: No discharge. Pharynx clear. External appearance of ears and nose normal.  Neck: Trachea midline. No obvious mass.    Resp: No accessory muscle use. No crackles. No wheezes. No rhonchi.  CV: Regular rate. Regular rhythm. No murmur or rub. No edema.   GI: Non-tender. Non-distended. No hernia.   Skin: Warm, dry, normal texture and turgor.   Lymph: No cervical LAD. No supraclavicular LAD.   M/S: / Ext. No cyanosis. No clubbing. No joint deformity.    Neuro: CN 2-12 are intact,  no neurologic deficits noted.    PT/INR: No results for input(s): \"PROTIME\", \"INR\" in the last 72 hours.  APTT: No results for input(s): \"APTT\" in the last 72 hours.    CBC:   Recent Labs     06/05/24  1823 06/06/24  0356   WBC 7.2 9.0   HGB 18.0* 17.2   HCT 52.4 51.6   MCV 88.6 89.0    165       BMP:   Recent Labs     06/05/24  1823 06/06/24  0356    136   K 4.0 4.0    103   CO2 24 20*   BUN 14 14   CREATININE 0.9 0.8       LIVER PROFILE:   Recent Labs     06/05/24  1823 06/06/24  0356   ALKPHOS 114 106   AST 17 17   ALT 16 17   BILITOT 0.8 0.8     No results for input(s): \"AMYLASE\" in the last 72 hours.  No results for input(s): \"LIPASE\" in the last 72 hours.    UA:  Recent Labs     06/05/24  2125   WBCUA 41*   RBCUA 0       TROPONIN: No results for input(s): \"CKTOTAL\", \"TROPONINI\" in the last 72 hours.    Lab Results   Component Value Date/Time    URRFLXCULT Yes 06/05/2024 09:25 PM       Invalid input(s): \"TSHREFLEX\"    No components found for: \"ZWK7638\"  POC GLUCOSE:    Recent Labs     06/06/24  0053 06/06/24  0700   POCGLU 112* 71     No results for input(s): \"LABA1C\" in the last 72 hours.   Lab Results   Component Value Date/Time    LABA1C 5.4 09/22/2023 05:22 AM         ASSESSMENT AND PLAN    Left lower extremity

## 2024-06-06 NOTE — PROGRESS NOTES
Physical Therapy      Calixto Steen  6/6/2024    -chart reviewed  -gone from room for MRI   -will attempt later as schedule permits    Electronically signed by VANI BARRIENTOS, PT on 6/6/2024 at 10:30 AM

## 2024-06-07 ENCOUNTER — APPOINTMENT (OUTPATIENT)
Dept: GENERAL RADIOLOGY | Age: 69
DRG: 638 | End: 2024-06-07
Payer: MEDICARE

## 2024-06-07 LAB
ANION GAP SERPL CALCULATED.3IONS-SCNC: 6 MMOL/L (ref 3–16)
BUN SERPL-MCNC: 13 MG/DL (ref 7–20)
CALCIUM SERPL-MCNC: 8.9 MG/DL (ref 8.3–10.6)
CHLORIDE SERPL-SCNC: 108 MMOL/L (ref 99–110)
CO2 SERPL-SCNC: 27 MMOL/L (ref 21–32)
CREAT SERPL-MCNC: 0.9 MG/DL (ref 0.8–1.3)
DEPRECATED RDW RBC AUTO: 14.5 % (ref 12.4–15.4)
GFR SERPLBLD CREATININE-BSD FMLA CKD-EPI: >90 ML/MIN/{1.73_M2}
GLUCOSE BLD-MCNC: 115 MG/DL (ref 70–99)
GLUCOSE BLD-MCNC: 133 MG/DL (ref 70–99)
GLUCOSE BLD-MCNC: 88 MG/DL (ref 70–99)
GLUCOSE BLD-MCNC: 97 MG/DL (ref 70–99)
GLUCOSE SERPL-MCNC: 100 MG/DL (ref 70–99)
HCT VFR BLD AUTO: 50.1 % (ref 40.5–52.5)
HGB BLD-MCNC: 17.3 G/DL (ref 13.5–17.5)
MCH RBC QN AUTO: 30.1 PG (ref 26–34)
MCHC RBC AUTO-ENTMCNC: 34.5 G/DL (ref 31–36)
MCV RBC AUTO: 87.4 FL (ref 80–100)
MRSA DNA SPEC QL NAA+PROBE: NORMAL
PERFORMED ON: ABNORMAL
PERFORMED ON: ABNORMAL
PERFORMED ON: NORMAL
PERFORMED ON: NORMAL
PLATELET # BLD AUTO: 165 K/UL (ref 135–450)
PMV BLD AUTO: 8.5 FL (ref 5–10.5)
POTASSIUM SERPL-SCNC: 4.6 MMOL/L (ref 3.5–5.1)
RBC # BLD AUTO: 5.74 M/UL (ref 4.2–5.9)
SODIUM SERPL-SCNC: 141 MMOL/L (ref 136–145)
VANCOMYCIN SERPL-MCNC: 10.9 UG/ML
WBC # BLD AUTO: 8.3 K/UL (ref 4–11)

## 2024-06-07 PROCEDURE — 6360000002 HC RX W HCPCS: Performed by: HOSPITALIST

## 2024-06-07 PROCEDURE — 85027 COMPLETE CBC AUTOMATED: CPT

## 2024-06-07 PROCEDURE — 94760 N-INVAS EAR/PLS OXIMETRY 1: CPT

## 2024-06-07 PROCEDURE — 1200000000 HC SEMI PRIVATE

## 2024-06-07 PROCEDURE — 99232 SBSQ HOSP IP/OBS MODERATE 35: CPT | Performed by: INTERNAL MEDICINE

## 2024-06-07 PROCEDURE — 36415 COLL VENOUS BLD VENIPUNCTURE: CPT

## 2024-06-07 PROCEDURE — 2580000003 HC RX 258: Performed by: HOSPITALIST

## 2024-06-07 PROCEDURE — 94640 AIRWAY INHALATION TREATMENT: CPT

## 2024-06-07 PROCEDURE — 6360000002 HC RX W HCPCS: Performed by: STUDENT IN AN ORGANIZED HEALTH CARE EDUCATION/TRAINING PROGRAM

## 2024-06-07 PROCEDURE — 6370000000 HC RX 637 (ALT 250 FOR IP): Performed by: STUDENT IN AN ORGANIZED HEALTH CARE EDUCATION/TRAINING PROGRAM

## 2024-06-07 PROCEDURE — 6370000000 HC RX 637 (ALT 250 FOR IP): Performed by: INTERNAL MEDICINE

## 2024-06-07 PROCEDURE — 9990000010 HC NO CHARGE VISIT

## 2024-06-07 PROCEDURE — 73560 X-RAY EXAM OF KNEE 1 OR 2: CPT

## 2024-06-07 PROCEDURE — 80048 BASIC METABOLIC PNL TOTAL CA: CPT

## 2024-06-07 PROCEDURE — 80202 ASSAY OF VANCOMYCIN: CPT

## 2024-06-07 PROCEDURE — 6370000000 HC RX 637 (ALT 250 FOR IP): Performed by: NURSE PRACTITIONER

## 2024-06-07 RX ORDER — LACTOBACILLUS RHAMNOSUS GG 10B CELL
1 CAPSULE ORAL 2 TIMES DAILY WITH MEALS
Status: DISCONTINUED | OUTPATIENT
Start: 2024-06-07 | End: 2024-06-10 | Stop reason: HOSPADM

## 2024-06-07 RX ADMIN — MOMETASONE FUROATE AND FORMOTEROL FUMARATE DIHYDRATE 2 PUFF: 200; 5 AEROSOL RESPIRATORY (INHALATION) at 08:21

## 2024-06-07 RX ADMIN — DICYCLOMINE HYDROCHLORIDE 20 MG: 20 TABLET ORAL at 09:03

## 2024-06-07 RX ADMIN — Medication 1 CAPSULE: at 10:48

## 2024-06-07 RX ADMIN — CEFEPIME 2000 MG: 2 INJECTION, POWDER, FOR SOLUTION INTRAVENOUS at 18:20

## 2024-06-07 RX ADMIN — LEVOTHYROXINE SODIUM 50 MCG: 0.05 TABLET ORAL at 06:09

## 2024-06-07 RX ADMIN — GABAPENTIN 300 MG: 300 CAPSULE ORAL at 20:07

## 2024-06-07 RX ADMIN — GABAPENTIN 300 MG: 300 CAPSULE ORAL at 09:03

## 2024-06-07 RX ADMIN — DICYCLOMINE HYDROCHLORIDE 20 MG: 20 TABLET ORAL at 12:13

## 2024-06-07 RX ADMIN — ATORVASTATIN CALCIUM 40 MG: 40 TABLET, FILM COATED ORAL at 09:05

## 2024-06-07 RX ADMIN — APIXABAN 5 MG: 5 TABLET, FILM COATED ORAL at 09:05

## 2024-06-07 RX ADMIN — FUROSEMIDE 20 MG: 20 TABLET ORAL at 09:05

## 2024-06-07 RX ADMIN — Medication 1 CAPSULE: at 18:16

## 2024-06-07 RX ADMIN — Medication: at 09:05

## 2024-06-07 RX ADMIN — VANCOMYCIN 1250 MG: 1.75 INJECTION, SOLUTION INTRAVENOUS at 01:54

## 2024-06-07 RX ADMIN — LISINOPRIL 10 MG: 10 TABLET ORAL at 09:05

## 2024-06-07 RX ADMIN — CEFEPIME 2000 MG: 2 INJECTION, POWDER, FOR SOLUTION INTRAVENOUS at 10:14

## 2024-06-07 RX ADMIN — DICYCLOMINE HYDROCHLORIDE 20 MG: 20 TABLET ORAL at 18:15

## 2024-06-07 RX ADMIN — LATANOPROST 1 DROP: 50 SOLUTION OPHTHALMIC at 20:07

## 2024-06-07 RX ADMIN — Medication: at 20:07

## 2024-06-07 RX ADMIN — TIOTROPIUM BROMIDE INHALATION SPRAY 2 PUFF: 3.12 SPRAY, METERED RESPIRATORY (INHALATION) at 08:21

## 2024-06-07 RX ADMIN — APIXABAN 5 MG: 5 TABLET, FILM COATED ORAL at 20:07

## 2024-06-07 RX ADMIN — DICYCLOMINE HYDROCHLORIDE 20 MG: 20 TABLET ORAL at 20:07

## 2024-06-07 RX ADMIN — CEFEPIME 2000 MG: 2 INJECTION, POWDER, FOR SOLUTION INTRAVENOUS at 01:52

## 2024-06-07 RX ADMIN — VANCOMYCIN 1250 MG: 1.75 INJECTION, SOLUTION INTRAVENOUS at 14:45

## 2024-06-07 RX ADMIN — AZITHROMYCIN 250 MG: 250 TABLET, FILM COATED ORAL at 09:03

## 2024-06-07 RX ADMIN — MOMETASONE FUROATE AND FORMOTEROL FUMARATE DIHYDRATE 2 PUFF: 200; 5 AEROSOL RESPIRATORY (INHALATION) at 21:34

## 2024-06-07 ASSESSMENT — PAIN DESCRIPTION - DESCRIPTORS
DESCRIPTORS: DISCOMFORT
DESCRIPTORS: THROBBING
DESCRIPTORS: DISCOMFORT
DESCRIPTORS: SORE;DISCOMFORT

## 2024-06-07 ASSESSMENT — PAIN - FUNCTIONAL ASSESSMENT
PAIN_FUNCTIONAL_ASSESSMENT: PREVENTS OR INTERFERES SOME ACTIVE ACTIVITIES AND ADLS

## 2024-06-07 ASSESSMENT — PAIN DESCRIPTION - LOCATION
LOCATION: ABDOMEN
LOCATION: LEG
LOCATION: ABDOMEN
LOCATION: ABDOMEN

## 2024-06-07 ASSESSMENT — PAIN SCALES - GENERAL
PAINLEVEL_OUTOF10: 1
PAINLEVEL_OUTOF10: 5
PAINLEVEL_OUTOF10: 5
PAINLEVEL_OUTOF10: 3

## 2024-06-07 ASSESSMENT — PAIN DESCRIPTION - ORIENTATION
ORIENTATION: MID
ORIENTATION: LEFT
ORIENTATION: MID
ORIENTATION: MID

## 2024-06-07 NOTE — PROGRESS NOTES
Pulmonary Progress Note    CC:  Follow up COPD    Subjective:  Remains on room air   Diarrhea today  Breathing is better       Intake/Output Summary (Last 24 hours) at 6/7/2024 0793  Last data filed at 6/7/2024 0558  Gross per 24 hour   Intake 1940 ml   Output --   Net 1940 ml         PHYSICAL EXAM:  Blood pressure 109/70, pulse 65, temperature 97.4 °F (36.3 °C), temperature source Oral, resp. rate 17, height 1.778 m (5' 10\"), weight (!) 150.2 kg (331 lb 2.1 oz), SpO2 96 %.'  Gen: No distress. Cyanotic   Eyes: PERRL. No sclera icterus. No conjunctival injection.   ENT: No discharge. Pharynx clear. External appearance of ears and nose normal.  Neck: Trachea midline. No obvious mass.    Resp: Better aeration, less wheezing  CV: Regular rate. Regular rhythm. No murmur or rub.    GI: Non-tender. Non-distended. No hernia.   Skin: Warm, dry, normal texture and turgor. No nodule on exposed extremities.   Lymph: No cervical LAD. No supraclavicular LAD.   M/S: No cyanosis. No clubbing. No joint deformity.    Neuro: Moves all four extremities. CN 2-12 tested, no defect noted.  Ext:   ++ edema    Medications:    Scheduled Meds:   mometasone-formoterol  2 puff Inhalation BID RT    And    tiotropium  2 puff Inhalation Daily RT    vancomycin (VANCOCIN) intermittent dosing (placeholder)   Other RX Placeholder    vancomycin  1,250 mg IntraVENous Q12H    gabapentin  300 mg Oral BID    cefepime  2,000 mg IntraVENous Q8H    azithromycin  250 mg Oral Daily    apixaban  5 mg Oral BID    atorvastatin  40 mg Oral Daily    dicyclomine  20 mg Oral 4x Daily    latanoprost  1 drop Both Eyes Nightly    levothyroxine  50 mcg Oral Daily    lisinopril  10 mg Oral Daily    nicotine  1 patch TransDERmal Daily    furosemide  20 mg Oral Daily    sodium chloride flush  5-40 mL IntraVENous 2 times per day    Hydrocerin   Topical BID    insulin lispro  0-4 Units SubCUTAneous TID WC    insulin lispro  0-4 Units SubCUTAneous Nightly       Continuous

## 2024-06-07 NOTE — PROGRESS NOTES
Patient to x-ray per stretcher per transport. Patient took off telemetry and stated that he refused to wear it any longer.  Electronically signed by Cassi Potter RN on 6/7/2024 at 5:34 PM

## 2024-06-07 NOTE — PLAN OF CARE
Problem: Discharge Planning  Goal: Discharge to home or other facility with appropriate resources     Problem: Pain  Goal: Verbalizes/displays adequate comfort level or baseline comfort level     Problem: Safety - Adult  Goal: Free from fall injury     Problem: Chronic Conditions and Co-morbidities  Goal: Patient's chronic conditions and co-morbidity symptoms are monitored and maintained or improved

## 2024-06-07 NOTE — PROGRESS NOTES
Hospitalist Progress Note  6/7/2024 8:17 AM    PCP: Mayi Camarena PA    [phone]     Date of Admission: 6/5/2024                                                                                                                     HOSPITAL COURSE    Patient demographics:  The patient  Calixto Steen is a 68 y.o. male     Significant past medical history:   Patient Active Problem List   Diagnosis    Urinary tract infection without hematuria    Morbid obesity with BMI of 40.0-44.9, adult ([facility])    Anticoagulated    LISA (obstructive sleep apnea)    Uncontrolled type 2 diabetes mellitus with hyperglycemia ([facility])    Chronic diastolic heart failure (HCC)    PAF (paroxysmal atrial fibrillation) ([facility])    Tobacco abuse    Cervical radiculopathy    COPD (chronic obstructive pulmonary disease) ([facility])    Coronary atherosclerosis    Lymphedema of both lower extremities    Glaucoma    Hypothyroidism due to acquired atrophy of thyroid    Mixed hyperlipidemia    Primary hypertension    Chronic congestive heart failure (HCC)    Cellulitis of left lower extremity    Abnormal CT of the chest    Nocturnal hypoxia         Presenting symptoms:      Diagnostic workup:      CONSULTS DURING ADMISSION :   PHARMACY TO DOSE VANCOMYCIN  IP CONSULT TO HOSPITALIST  PHARMACY TO DOSE VANCOMYCIN  IP CONSULT TO SOCIAL WORK  IP CONSULT TO PULMONOLOGY      Patient was diagnosed with:  Left lower extremity cellulitis      Treatment while inpatient:  68 years old male with medical history significant for chronic lymphedema hypertension diabetes mellitus diabetic neuropathy DVT on Eliquis        tobacco abuse and COPD  , previous CVA and hypothyroidism.      Patient presented with left lower extremity swelling and pain.  Patient was diagnosed with left lower extremity cellulitis.               Patient's CT chest was abnormal suggestive of inflammation likely due to heavy smoking.  Z-David was used for anti-inflammatory properties.  Patient was continued  patient and / or the family were informed of the results of any tests, a time was given to answer questions, a plan was proposed and they agreed with plan.    USAMA SPEARS MD    This note was transcribed using Dragon Dictation software. Please disregard any translational errors.

## 2024-06-07 NOTE — PROGRESS NOTES
Physician Progress Note      PATIENT:               CAROLEE ANGUIANO  CSN #:                  [id number]  :                       1955  ADMIT DATE:       2024 5:24 PM  DISCH DATE:  RESPONDING  PROVIDER #:        Emelia Yanez MD          QUERY TEXT:    Internal Medicine,    Pt admitted with  LLE cellulitis and noted to have DM. If possible, please   document in progress notes and discharge summary the relationship, if any,   between cellulitis and DM.    The medical record reflects the following:  Risk Factors: MD  Clinical Indicators: LLE cellulitis, no documentation of wounds or injury  Treatment: labs, imaging, IV atb, medical management    Thank you,  Amanda Fairbanks RN [facility]  [phone]  Options provided:  -- LLE  cellulitis associated with Diabetes  -- LLE cellulitis unrelated to Diabetes  -- Other - I will add my own diagnosis  -- Disagree - Not applicable / Not valid  -- Disagree - Clinically unable to determine / Unknown  -- Refer to Clinical Documentation Reviewer    PROVIDER RESPONSE TEXT:    LLE cellulitis associated with Diabetes.    Query created by: Amanda Fairbanks on 2024 3:02 PM      Electronically signed by:  Emelia Yanez MD 2024 3:15 PM

## 2024-06-07 NOTE — PLAN OF CARE
Problem: Discharge Planning  Goal: Discharge to home or other facility with appropriate resources  Outcome: Progressing  Flowsheets (Taken 6/6/2024 2052)  Discharge to home or other facility with appropriate resources:   Identify barriers to discharge with patient and caregiver   Identify discharge learning needs (meds, wound care, etc)   Arrange for needed discharge resources and transportation as appropriate     Problem: Pain  Goal: Verbalizes/displays adequate comfort level or baseline comfort level  Outcome: Progressing  Flowsheets (Taken 6/6/2024 2049)  Verbalizes/displays adequate comfort level or baseline comfort level:   Encourage patient to monitor pain and request assistance   Assess pain using appropriate pain scale   Administer analgesics based on type and severity of pain and evaluate response     Problem: Safety - Adult  Goal: Free from fall injury  Outcome: Progressing     Problem: Skin/Tissue Integrity  Goal: Absence of new skin breakdown  Description: 1.  Monitor for areas of redness and/or skin breakdown  2.  Assess vascular access sites hourly  3.  Every 4-6 hours minimum:  Change oxygen saturation probe site  4.  Every 4-6 hours:  If on nasal continuous positive airway pressure, respiratory therapy assess nares and determine need for appliance change or resting period.  Outcome: Progressing     Problem: Chronic Conditions and Co-morbidities  Goal: Patient's chronic conditions and co-morbidity symptoms are monitored and maintained or improved  Outcome: Progressing  Flowsheets (Taken 6/6/2024 2052)  Care Plan - Patient's Chronic Conditions and Co-Morbidity Symptoms are Monitored and Maintained or Improved:   Monitor and assess patient's chronic conditions and comorbid symptoms for stability, deterioration, or improvement   Collaborate with multidisciplinary team to address chronic and comorbid conditions and prevent exacerbation or deterioration

## 2024-06-07 NOTE — CARE COORDINATION
Case Management Assessment  Initial Evaluation    Date/Time of Evaluation: 6/7/2024 2:18 PM  Assessment Completed by: SANAZ Villalba    If patient is discharged prior to next notation, then this note serves as note for discharge by case management.    Patient Name: Cailxto Steen                   YOB: 1955  Diagnosis: Cellulitis of left lower extremity [L03.116]  Elevated d-dimer [R79.89]  Cellulitis of both lower extremities [L03.115, L03.116]  Chest pain, unspecified type [R07.9]                   Date / Time: 6/5/2024  5:24 PM    Patient Admission Status: Inpatient   Readmission Risk (Low < 19, Mod (19-27), High > 27): Readmission Risk Score: 9.2    Current PCP: Mayi Camarena PA  PCP verified by CM? Yes    Chart Reviewed: Yes      History Provided by: Patient, Medical Record  Patient Orientation: Alert and Oriented    Patient Cognition: Alert    Hospitalization in the last 30 days (Readmission):  No    If yes, Readmission Assessment in  Navigator will be completed.    Advance Directives:      Code Status: Full Code   Patient's Primary Decision Maker is: Legal Next of Kin    Primary Decision Maker: cristhian kellogg - [place] - [phone]    Discharge Planning:    Patient lives with: Other (Comment) (was living with friends; patient reports he cannot return) Type of Home: Homeless  Primary Care Giver: Self  Patient Support Systems include: None   Current Financial resources: Medicare  Current community resources: None  Current services prior to admission: Durable Medical Equipment            Current DME: Wheelchair, Walker            Type of Home Care services:  None    ADLS  Prior functional level: Independent in ADLs/IADLs  Current functional level: Assistance with the following:, Housework, Cooking    PT AM-PAC: 21 /24  OT AM-PAC: 17 /24    Family can provide assistance at DC: No  Would you like Case Management to discuss the discharge plan with any other family members/significant  level of care or had medicaid and met medicaid level of care.   Currently, patient therapy notes recommend home setting.   Provided patient with shelter list and hotline. Offered to make [facility] to [facility] and explained that the center has rules that must be followed and 3 flights of steps that patient must be able to manage (in case of fire).   Patient reported he is not interested in shelters and would not be able to do steps at the respite center. Discussed possible week to week rentals at long term stay hotels. Patient gave no response to this.   Patient reported that he works with a  from [facility] [employer] and requested that Sw call her regarding what can be done for patient. Called Magui Mead ([phone]) while present in room,  per patient request. She also asked Sw to assist patient in obtaining housing. Explained to Magui limitations of what hospital Sw could do--provide resources and give shelter information, which Sw did do. Magui reported that what she does is wrap around services to help increase patient's health status. She reported this could include providing transportation, assisting with applications, etc. Sw encouraged patient and Magui to work together on patient's housing need/applications.     Gave patient shelter list and hotline phone number, 211 number, assisted living facilities, list of senior apartments, and COA assisted living waiver information. Encouraged patient to work with Magui on long term housing needs.     The Plan for Transition of Care is related to the following treatment goals of Cellulitis of left lower extremity [L03.116]  Elevated d-dimer [R79.89]  Cellulitis of both lower extremities [L03.115, L03.116]  Chest pain, unspecified type [R07.9]    SANAZ Villalba  Case Management Department  Ph: [phone]

## 2024-06-07 NOTE — PROGRESS NOTES
Physical Therapy      Calixto Steen  6/7/2024    -chart reviewed  -nursing indicates he is up ad kendal using his rollator walker   -no acute PT needs at this time   -will sign off    Electronically signed by VANI BARRIENTOS, PT on 6/7/2024 at 3:05 PM

## 2024-06-07 NOTE — PROGRESS NOTES
Discharged per wheelchair per transportation. Family to transport patient to [place] for Rehab.  Electronically signed by Cassi Potter RN on 6/7/2024 at 5:00 PM

## 2024-06-08 PROBLEM — R79.89 ELEVATED D-DIMER: Status: ACTIVE | Noted: 2024-06-08

## 2024-06-08 LAB
ANION GAP SERPL CALCULATED.3IONS-SCNC: 12 MMOL/L (ref 3–16)
BACTERIA UR CULT: ABNORMAL
BACTERIA UR CULT: ABNORMAL
BUN SERPL-MCNC: 11 MG/DL (ref 7–20)
CALCIUM SERPL-MCNC: 8.9 MG/DL (ref 8.3–10.6)
CHLORIDE SERPL-SCNC: 105 MMOL/L (ref 99–110)
CO2 SERPL-SCNC: 23 MMOL/L (ref 21–32)
CREAT SERPL-MCNC: 0.9 MG/DL (ref 0.8–1.3)
DEPRECATED RDW RBC AUTO: 14.3 % (ref 12.4–15.4)
GFR SERPLBLD CREATININE-BSD FMLA CKD-EPI: >90 ML/MIN/{1.73_M2}
GLUCOSE BLD-MCNC: 103 MG/DL (ref 70–99)
GLUCOSE BLD-MCNC: 110 MG/DL (ref 70–99)
GLUCOSE BLD-MCNC: 89 MG/DL (ref 70–99)
GLUCOSE BLD-MCNC: 96 MG/DL (ref 70–99)
GLUCOSE SERPL-MCNC: 125 MG/DL (ref 70–99)
HCT VFR BLD AUTO: 51.3 % (ref 40.5–52.5)
HGB BLD-MCNC: 17.2 G/DL (ref 13.5–17.5)
MCH RBC QN AUTO: 30.1 PG (ref 26–34)
MCHC RBC AUTO-ENTMCNC: 33.5 G/DL (ref 31–36)
MCV RBC AUTO: 89.7 FL (ref 80–100)
ORGANISM: ABNORMAL
PERFORMED ON: ABNORMAL
PERFORMED ON: ABNORMAL
PERFORMED ON: NORMAL
PERFORMED ON: NORMAL
PLATELET # BLD AUTO: 164 K/UL (ref 135–450)
PMV BLD AUTO: 8.7 FL (ref 5–10.5)
POTASSIUM SERPL-SCNC: 4.6 MMOL/L (ref 3.5–5.1)
RBC # BLD AUTO: 5.72 M/UL (ref 4.2–5.9)
SODIUM SERPL-SCNC: 140 MMOL/L (ref 136–145)
WBC # BLD AUTO: 7.6 K/UL (ref 4–11)

## 2024-06-08 PROCEDURE — 80048 BASIC METABOLIC PNL TOTAL CA: CPT

## 2024-06-08 PROCEDURE — 97535 SELF CARE MNGMENT TRAINING: CPT

## 2024-06-08 PROCEDURE — 1200000000 HC SEMI PRIVATE

## 2024-06-08 PROCEDURE — 6370000000 HC RX 637 (ALT 250 FOR IP): Performed by: INTERNAL MEDICINE

## 2024-06-08 PROCEDURE — 92526 ORAL FUNCTION THERAPY: CPT

## 2024-06-08 PROCEDURE — 94760 N-INVAS EAR/PLS OXIMETRY 1: CPT

## 2024-06-08 PROCEDURE — 85027 COMPLETE CBC AUTOMATED: CPT

## 2024-06-08 PROCEDURE — 6370000000 HC RX 637 (ALT 250 FOR IP): Performed by: STUDENT IN AN ORGANIZED HEALTH CARE EDUCATION/TRAINING PROGRAM

## 2024-06-08 PROCEDURE — 2580000003 HC RX 258: Performed by: HOSPITALIST

## 2024-06-08 PROCEDURE — 6360000002 HC RX W HCPCS: Performed by: HOSPITALIST

## 2024-06-08 PROCEDURE — 36415 COLL VENOUS BLD VENIPUNCTURE: CPT

## 2024-06-08 PROCEDURE — 2580000003 HC RX 258: Performed by: STUDENT IN AN ORGANIZED HEALTH CARE EDUCATION/TRAINING PROGRAM

## 2024-06-08 PROCEDURE — 6370000000 HC RX 637 (ALT 250 FOR IP): Performed by: NURSE PRACTITIONER

## 2024-06-08 PROCEDURE — 94640 AIRWAY INHALATION TREATMENT: CPT

## 2024-06-08 PROCEDURE — 97530 THERAPEUTIC ACTIVITIES: CPT

## 2024-06-08 PROCEDURE — 99222 1ST HOSP IP/OBS MODERATE 55: CPT | Performed by: ORTHOPAEDIC SURGERY

## 2024-06-08 RX ORDER — ATORVASTATIN CALCIUM 40 MG/1
40 TABLET, FILM COATED ORAL
Status: DISCONTINUED | OUTPATIENT
Start: 2024-06-09 | End: 2024-06-10 | Stop reason: HOSPADM

## 2024-06-08 RX ORDER — FUROSEMIDE 10 MG/ML
40 INJECTION INTRAMUSCULAR; INTRAVENOUS DAILY
Status: DISCONTINUED | OUTPATIENT
Start: 2024-06-08 | End: 2024-06-09

## 2024-06-08 RX ADMIN — AZITHROMYCIN 250 MG: 250 TABLET, FILM COATED ORAL at 09:51

## 2024-06-08 RX ADMIN — GABAPENTIN 300 MG: 300 CAPSULE ORAL at 20:45

## 2024-06-08 RX ADMIN — DICYCLOMINE HYDROCHLORIDE 20 MG: 20 TABLET ORAL at 20:45

## 2024-06-08 RX ADMIN — DICYCLOMINE HYDROCHLORIDE 20 MG: 20 TABLET ORAL at 17:14

## 2024-06-08 RX ADMIN — Medication: at 20:46

## 2024-06-08 RX ADMIN — MOMETASONE FUROATE AND FORMOTEROL FUMARATE DIHYDRATE 2 PUFF: 200; 5 AEROSOL RESPIRATORY (INHALATION) at 09:38

## 2024-06-08 RX ADMIN — FUROSEMIDE 40 MG: 10 INJECTION, SOLUTION INTRAMUSCULAR; INTRAVENOUS at 19:04

## 2024-06-08 RX ADMIN — SODIUM CHLORIDE, PRESERVATIVE FREE 10 ML: 5 INJECTION INTRAVENOUS at 20:46

## 2024-06-08 RX ADMIN — Medication 1 CAPSULE: at 09:50

## 2024-06-08 RX ADMIN — APIXABAN 5 MG: 5 TABLET, FILM COATED ORAL at 09:50

## 2024-06-08 RX ADMIN — GABAPENTIN 300 MG: 300 CAPSULE ORAL at 09:50

## 2024-06-08 RX ADMIN — TIOTROPIUM BROMIDE INHALATION SPRAY 2 PUFF: 3.12 SPRAY, METERED RESPIRATORY (INHALATION) at 09:38

## 2024-06-08 RX ADMIN — MOMETASONE FUROATE AND FORMOTEROL FUMARATE DIHYDRATE 2 PUFF: 200; 5 AEROSOL RESPIRATORY (INHALATION) at 20:32

## 2024-06-08 RX ADMIN — DICYCLOMINE HYDROCHLORIDE 20 MG: 20 TABLET ORAL at 12:40

## 2024-06-08 RX ADMIN — ATORVASTATIN CALCIUM 40 MG: 40 TABLET, FILM COATED ORAL at 09:50

## 2024-06-08 RX ADMIN — CEFEPIME 2000 MG: 2 INJECTION, POWDER, FOR SOLUTION INTRAVENOUS at 18:54

## 2024-06-08 RX ADMIN — LISINOPRIL 10 MG: 10 TABLET ORAL at 09:51

## 2024-06-08 RX ADMIN — FUROSEMIDE 20 MG: 20 TABLET ORAL at 09:52

## 2024-06-08 RX ADMIN — Medication 1 CAPSULE: at 17:14

## 2024-06-08 RX ADMIN — CEFEPIME 2000 MG: 2 INJECTION, POWDER, FOR SOLUTION INTRAVENOUS at 09:59

## 2024-06-08 RX ADMIN — LEVOTHYROXINE SODIUM 50 MCG: 0.05 TABLET ORAL at 05:21

## 2024-06-08 RX ADMIN — LATANOPROST 1 DROP: 50 SOLUTION OPHTHALMIC at 20:47

## 2024-06-08 RX ADMIN — Medication: at 09:53

## 2024-06-08 RX ADMIN — CEFEPIME 2000 MG: 2 INJECTION, POWDER, FOR SOLUTION INTRAVENOUS at 02:30

## 2024-06-08 RX ADMIN — DICYCLOMINE HYDROCHLORIDE 20 MG: 20 TABLET ORAL at 09:50

## 2024-06-08 RX ADMIN — APIXABAN 5 MG: 5 TABLET, FILM COATED ORAL at 20:45

## 2024-06-08 ASSESSMENT — PAIN DESCRIPTION - LOCATION
LOCATION: ABDOMEN

## 2024-06-08 ASSESSMENT — PAIN SCALES - GENERAL
PAINLEVEL_OUTOF10: 1
PAINLEVEL_OUTOF10: 0
PAINLEVEL_OUTOF10: 2
PAINLEVEL_OUTOF10: 1

## 2024-06-08 ASSESSMENT — PAIN DESCRIPTION - ORIENTATION
ORIENTATION: MID

## 2024-06-08 ASSESSMENT — PAIN DESCRIPTION - DESCRIPTORS
DESCRIPTORS: DISCOMFORT
DESCRIPTORS: ACHING
DESCRIPTORS: DISCOMFORT

## 2024-06-08 ASSESSMENT — PAIN - FUNCTIONAL ASSESSMENT
PAIN_FUNCTIONAL_ASSESSMENT: PREVENTS OR INTERFERES SOME ACTIVE ACTIVITIES AND ADLS

## 2024-06-08 NOTE — PROGRESS NOTES
Patient sitting up in chair. Patient alert and oriented x 4. Telemetry showing sinus marty to normal sinus rhythm . Respirations regular and unlabored on room air. Patient denies the need for pain medication at present time. Fall/safety precautions in place.  Electronically signed by Cassi Potter RN on 6/7/2024 at 8:06 PM

## 2024-06-08 NOTE — PLAN OF CARE
Problem: Discharge Planning  Goal: Discharge to home or other facility with appropriate resources     Problem: Pain  Goal: Verbalizes/displays adequate comfort level or baseline comfort level     Problem: Safety - Adult  Goal: Free from fall injury     Problem: Chronic Conditions and Co-morbidities  Goal: Patient's chronic conditions and co-morbidity symptoms are monitored and maintained or improved     Problem: ABCDS Injury Assessment  Goal: Absence of physical injury
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
## 2024-06-08 NOTE — CONSULTS
PATIENT NAME:                     Calixto Steen  YOB: 1955   MEDICAL RECORD#         [id number]  SURGEON:                 Shamir Benitez MD      CHIEF COMPLAINT: left knee pain.    History:Mr. Calixto Steen is a 68-year-old gentleman who was admitted with bilateral lower extremity swelling/cellulitis.  He has been dealing with these issues for many years.  The patient has also been having increasing left knee pain over the past year.  He is morbidly obese.  The patient has a past medical history remarkable for prior CVA, diabetes, congestive heart failure and prior DVT of the left lower extremity.  He denies any prior treatment for the left knee.  The patient has difficulty getting up from a seated position.  He has pain at nighttime.  He has difficulty with stairs.    PAST MEDICAL HISTORY:   Past Medical History:   Diagnosis Date    Cellulitis 09/21/2023    Cerebrovascular accident (HCC) 08/05/2022    Chest pain 01/01/2023    Chest pain, rule out acute myocardial infarction 12/30/2022    Chronic deep vein thrombosis (DVT) of femoral vein of left lower extremity (HCC) 08/21/2020    Chronic diastolic CHF (congestive heart failure) ([facility])     DM (diabetes mellitus), type 2 ([facility])     HTN (hypertension)     Neutrophilia 09/22/2023    Pain of right lower extremity 09/22/2023    Sleep apnea         MEDICATIONS: The patient's medication reconciliation form was extensively reviewed and noted.     ALLERGIES: No Known Allergies     SOCIAL HISTORY:   Social History     Socioeconomic History    Marital status: Unknown     Spouse name: Not on file    Number of children: Not on file    Years of education: Not on file    Highest education level: Not on file   Occupational History    Not on file   Tobacco Use    Smoking status: Former     Current packs/day: 3.00     Types: Cigarettes    Smokeless tobacco: Never   Substance and Sexual Activity    Alcohol use: Never    Drug use: Never    Sexual activity:
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
## 2024-06-08 NOTE — PROGRESS NOTES
Patient sitting up in chair and watching TV.  Patient alert and oriented x 4. Respirations regular and unlabored on room air.  Patient denies the need for pain medication at the present time.  Fall/safety precautions in place. Call light in reach.  Electronically signed by Cassi Potter RN on 6/7/2024 at 8:09 PM

## 2024-06-08 NOTE — PROGRESS NOTES
Hospitalist Progress Note  6/8/2024 8:07 AM    PCP: Mayi Camarena PA    [id number]     Date of Admission: 6/5/2024                                                                                                                     HOSPITAL COURSE    Patient demographics:  The patient  Calixto Steen is a 68 y.o. male     Significant past medical history:   Patient Active Problem List   Diagnosis    Urinary tract infection without hematuria    Morbid obesity with BMI of 40.0-44.9, adult (HCC)    Anticoagulated    LISA (obstructive sleep apnea)    Uncontrolled type 2 diabetes mellitus with hyperglycemia (HCC)    Chronic diastolic heart failure (HCC)    PAF (paroxysmal atrial fibrillation) ([facility])    Tobacco abuse    Cervical radiculopathy    COPD (chronic obstructive pulmonary disease) ([facility])    Coronary atherosclerosis    Lymphedema of both lower extremities    Glaucoma    Hypothyroidism due to acquired atrophy of thyroid    Mixed hyperlipidemia    Primary hypertension    Chronic congestive heart failure (HCC)    Cellulitis of left lower extremity    Abnormal CT of the chest    Nocturnal hypoxia         Presenting symptoms:      Diagnostic workup:      CONSULTS DURING ADMISSION :   PHARMACY TO DOSE VANCOMYCIN  IP CONSULT TO HOSPITALIST  PHARMACY TO DOSE VANCOMYCIN  IP CONSULT TO SOCIAL WORK  IP CONSULT TO PULMONOLOGY  IP CONSULT TO ORTHOPEDIC SURGERY      Patient was diagnosed with:  Left lower extremity cellulitis      Treatment while inpatient:  68 years old male with medical history significant for chronic lymphedema hypertension diabetes mellitus diabetic neuropathy DVT on Eliquis        tobacco abuse and COPD  , previous CVA and hypothyroidism.      Patient presented with left lower extremity swelling and pain.  Patient was diagnosed with left lower extremity cellulitis.               Patient's CT chest was abnormal suggestive of inflammation likely due to heavy smoking.  Z-David was used for anti-inflammatory  properties.  Patient was continued on Dulera and Spiriva.  Pulmonary followed the patient.                                                           ----------------------------------------------------------      SUBJECTIVE COMPLAINTS-     Diet: ADULT DIET; Regular; No Added Salt (3-4 gm)      OBJECTIVE:   Patient Active Problem List   Diagnosis    Urinary tract infection without hematuria    Morbid obesity with BMI of 40.0-44.9, adult ([facility])    Anticoagulated    LISA (obstructive sleep apnea)    Uncontrolled type 2 diabetes mellitus with hyperglycemia ([facility])    Chronic diastolic heart failure ([facility])    PAF (paroxysmal atrial fibrillation) ([facility])    Tobacco abuse    Cervical radiculopathy    COPD (chronic obstructive pulmonary disease) ([facility])    Coronary atherosclerosis    Lymphedema of both lower extremities    Glaucoma    Hypothyroidism due to acquired atrophy of thyroid    Mixed hyperlipidemia    Primary hypertension    Chronic congestive heart failure ([facility])    Cellulitis of left lower extremity    Abnormal CT of the chest    Nocturnal hypoxia       Allergies  Patient has no known allergies.    Medications    Scheduled Meds:   lactobacillus  1 capsule Oral BID WC    mometasone-formoterol  2 puff Inhalation BID RT    And    tiotropium  2 puff Inhalation Daily RT    gabapentin  300 mg Oral BID    cefepime  2,000 mg IntraVENous Q8H    azithromycin  250 mg Oral Daily    apixaban  5 mg Oral BID    atorvastatin  40 mg Oral Daily    dicyclomine  20 mg Oral 4x Daily    latanoprost  1 drop Both Eyes Nightly    levothyroxine  50 mcg Oral Daily    lisinopril  10 mg Oral Daily    nicotine  1 patch TransDERmal Daily    furosemide  20 mg Oral Daily    sodium chloride flush  5-40 mL IntraVENous 2 times per day    Hydrocerin   Topical BID    insulin lispro  0-4 Units SubCUTAneous TID WC    insulin lispro  0-4 Units SubCUTAneous Nightly     Continuous Infusions:   sodium chloride      dextrose       PRN Meds:  albuterol, hydrOXYzine

## 2024-06-08 NOTE — PLAN OF CARE
Problem: Discharge Planning  Goal: Discharge to home or other facility with appropriate resources  6/8/2024 0256 by Darlin Rendon RN  Outcome: Progressing  6/7/2024 1949 by Cassi Potter RN  Flowsheets (Taken 6/7/2024 1500)  Discharge to home or other facility with appropriate resources:   Identify barriers to discharge with patient and caregiver   Identify discharge learning needs (meds, wound care, etc)   Arrange for needed discharge resources and transportation as appropriate   Refer to discharge planning if patient needs post-hospital services based on physician order or complex needs related to functional status, cognitive ability or social support system     Problem: Pain  Goal: Verbalizes/displays adequate comfort level or baseline comfort level  6/8/2024 0256 by Darlin Rendon RN  Outcome: Progressing  6/7/2024 1949 by Cassi Potter RN  Flowsheets (Taken 6/7/2024 1500)  Verbalizes/displays adequate comfort level or baseline comfort level:   Encourage patient to monitor pain and request assistance   Administer analgesics based on type and severity of pain and evaluate response   Consider cultural and social influences on pain and pain management   Assess pain using appropriate pain scale   Implement non-pharmacological measures as appropriate and evaluate response   Notify Licensed Independent Practitioner if interventions unsuccessful or patient reports new pain     Problem: Safety - Adult  Goal: Free from fall injury  6/8/2024 0256 by Darlin Rendon RN  Outcome: Progressing  Flowsheets (Taken 6/8/2024 0255)  Free From Fall Injury: Instruct family/caregiver on patient safety  6/7/2024 1949 by Cassi Potter RN  Flowsheets (Taken 6/7/2024 1500)  Free From Fall Injury: Instruct family/caregiver on patient safety     Problem: Skin/Tissue Integrity  Goal: Absence of new skin breakdown  Description: 1.  Monitor for areas of redness and/or skin breakdown  2.  Assess vascular access sites hourly  3.

## 2024-06-09 LAB
ANION GAP SERPL CALCULATED.3IONS-SCNC: 12 MMOL/L (ref 3–16)
BUN SERPL-MCNC: 11 MG/DL (ref 7–20)
CALCIUM SERPL-MCNC: 8.8 MG/DL (ref 8.3–10.6)
CHLORIDE SERPL-SCNC: 100 MMOL/L (ref 99–110)
CO2 SERPL-SCNC: 24 MMOL/L (ref 21–32)
CREAT SERPL-MCNC: 0.8 MG/DL (ref 0.8–1.3)
DEPRECATED RDW RBC AUTO: 14.2 % (ref 12.4–15.4)
GFR SERPLBLD CREATININE-BSD FMLA CKD-EPI: >90 ML/MIN/{1.73_M2}
GLUCOSE BLD-MCNC: 104 MG/DL (ref 70–99)
GLUCOSE BLD-MCNC: 109 MG/DL (ref 70–99)
GLUCOSE BLD-MCNC: 141 MG/DL (ref 70–99)
GLUCOSE BLD-MCNC: 92 MG/DL (ref 70–99)
GLUCOSE SERPL-MCNC: 97 MG/DL (ref 70–99)
HCT VFR BLD AUTO: 48.5 % (ref 40.5–52.5)
HGB BLD-MCNC: 16.7 G/DL (ref 13.5–17.5)
MCH RBC QN AUTO: 30.1 PG (ref 26–34)
MCHC RBC AUTO-ENTMCNC: 34.3 G/DL (ref 31–36)
MCV RBC AUTO: 87.7 FL (ref 80–100)
PERFORMED ON: ABNORMAL
PERFORMED ON: NORMAL
PLATELET # BLD AUTO: 166 K/UL (ref 135–450)
PMV BLD AUTO: 8.7 FL (ref 5–10.5)
POTASSIUM SERPL-SCNC: 3.7 MMOL/L (ref 3.5–5.1)
RBC # BLD AUTO: 5.53 M/UL (ref 4.2–5.9)
SODIUM SERPL-SCNC: 136 MMOL/L (ref 136–145)
WBC # BLD AUTO: 8.3 K/UL (ref 4–11)

## 2024-06-09 PROCEDURE — 6370000000 HC RX 637 (ALT 250 FOR IP): Performed by: NURSE PRACTITIONER

## 2024-06-09 PROCEDURE — 94640 AIRWAY INHALATION TREATMENT: CPT

## 2024-06-09 PROCEDURE — 6370000000 HC RX 637 (ALT 250 FOR IP): Performed by: STUDENT IN AN ORGANIZED HEALTH CARE EDUCATION/TRAINING PROGRAM

## 2024-06-09 PROCEDURE — 1200000000 HC SEMI PRIVATE

## 2024-06-09 PROCEDURE — 6360000002 HC RX W HCPCS: Performed by: HOSPITALIST

## 2024-06-09 PROCEDURE — 2580000003 HC RX 258: Performed by: HOSPITALIST

## 2024-06-09 PROCEDURE — 6370000000 HC RX 637 (ALT 250 FOR IP): Performed by: INTERNAL MEDICINE

## 2024-06-09 PROCEDURE — 2580000003 HC RX 258: Performed by: STUDENT IN AN ORGANIZED HEALTH CARE EDUCATION/TRAINING PROGRAM

## 2024-06-09 PROCEDURE — 85027 COMPLETE CBC AUTOMATED: CPT

## 2024-06-09 PROCEDURE — 94760 N-INVAS EAR/PLS OXIMETRY 1: CPT

## 2024-06-09 PROCEDURE — 36415 COLL VENOUS BLD VENIPUNCTURE: CPT

## 2024-06-09 PROCEDURE — 6370000000 HC RX 637 (ALT 250 FOR IP): Performed by: HOSPITALIST

## 2024-06-09 PROCEDURE — 80048 BASIC METABOLIC PNL TOTAL CA: CPT

## 2024-06-09 RX ORDER — 0.9 % SODIUM CHLORIDE 0.9 %
500 INTRAVENOUS SOLUTION INTRAVENOUS ONCE
Status: COMPLETED | OUTPATIENT
Start: 2024-06-09 | End: 2024-06-09

## 2024-06-09 RX ORDER — METHOCARBAMOL 750 MG/1
750 TABLET, FILM COATED ORAL 3 TIMES DAILY
Status: DISCONTINUED | OUTPATIENT
Start: 2024-06-09 | End: 2024-06-10 | Stop reason: HOSPADM

## 2024-06-09 RX ORDER — FUROSEMIDE 20 MG/1
20 TABLET ORAL DAILY
Status: DISCONTINUED | OUTPATIENT
Start: 2024-06-10 | End: 2024-06-10 | Stop reason: HOSPADM

## 2024-06-09 RX ADMIN — LEVOTHYROXINE SODIUM 50 MCG: 0.05 TABLET ORAL at 06:55

## 2024-06-09 RX ADMIN — DICYCLOMINE HYDROCHLORIDE 20 MG: 20 TABLET ORAL at 12:31

## 2024-06-09 RX ADMIN — METHOCARBAMOL TABLETS 750 MG: 750 TABLET, COATED ORAL at 21:33

## 2024-06-09 RX ADMIN — DICYCLOMINE HYDROCHLORIDE 20 MG: 20 TABLET ORAL at 09:02

## 2024-06-09 RX ADMIN — SODIUM CHLORIDE, PRESERVATIVE FREE 10 ML: 5 INJECTION INTRAVENOUS at 21:33

## 2024-06-09 RX ADMIN — DICYCLOMINE HYDROCHLORIDE 20 MG: 20 TABLET ORAL at 21:33

## 2024-06-09 RX ADMIN — APIXABAN 5 MG: 5 TABLET, FILM COATED ORAL at 21:33

## 2024-06-09 RX ADMIN — GABAPENTIN 300 MG: 300 CAPSULE ORAL at 21:33

## 2024-06-09 RX ADMIN — Medication: at 09:04

## 2024-06-09 RX ADMIN — LISINOPRIL 10 MG: 10 TABLET ORAL at 09:02

## 2024-06-09 RX ADMIN — Medication: at 21:33

## 2024-06-09 RX ADMIN — CEFEPIME 2000 MG: 2 INJECTION, POWDER, FOR SOLUTION INTRAVENOUS at 17:10

## 2024-06-09 RX ADMIN — GABAPENTIN 300 MG: 300 CAPSULE ORAL at 09:02

## 2024-06-09 RX ADMIN — LATANOPROST 1 DROP: 50 SOLUTION OPHTHALMIC at 21:33

## 2024-06-09 RX ADMIN — Medication 1 CAPSULE: at 09:02

## 2024-06-09 RX ADMIN — AZITHROMYCIN 250 MG: 250 TABLET, FILM COATED ORAL at 09:02

## 2024-06-09 RX ADMIN — APIXABAN 5 MG: 5 TABLET, FILM COATED ORAL at 09:02

## 2024-06-09 RX ADMIN — MOMETASONE FUROATE AND FORMOTEROL FUMARATE DIHYDRATE 2 PUFF: 200; 5 AEROSOL RESPIRATORY (INHALATION) at 07:39

## 2024-06-09 RX ADMIN — MOMETASONE FUROATE AND FORMOTEROL FUMARATE DIHYDRATE 2 PUFF: 200; 5 AEROSOL RESPIRATORY (INHALATION) at 19:20

## 2024-06-09 RX ADMIN — METHOCARBAMOL TABLETS 750 MG: 750 TABLET, COATED ORAL at 17:07

## 2024-06-09 RX ADMIN — SODIUM CHLORIDE 500 ML: 9 INJECTION, SOLUTION INTRAVENOUS at 10:15

## 2024-06-09 RX ADMIN — ATORVASTATIN CALCIUM 40 MG: 40 TABLET, FILM COATED ORAL at 21:33

## 2024-06-09 RX ADMIN — TIOTROPIUM BROMIDE INHALATION SPRAY 2 PUFF: 3.12 SPRAY, METERED RESPIRATORY (INHALATION) at 07:39

## 2024-06-09 RX ADMIN — CEFEPIME 2000 MG: 2 INJECTION, POWDER, FOR SOLUTION INTRAVENOUS at 09:06

## 2024-06-09 RX ADMIN — CEFEPIME 2000 MG: 2 INJECTION, POWDER, FOR SOLUTION INTRAVENOUS at 01:56

## 2024-06-09 RX ADMIN — Medication 1 CAPSULE: at 17:08

## 2024-06-09 RX ADMIN — FUROSEMIDE 40 MG: 10 INJECTION, SOLUTION INTRAMUSCULAR; INTRAVENOUS at 09:02

## 2024-06-09 RX ADMIN — DICYCLOMINE HYDROCHLORIDE 20 MG: 20 TABLET ORAL at 17:08

## 2024-06-09 ASSESSMENT — PAIN SCALES - GENERAL
PAINLEVEL_OUTOF10: 0
PAINLEVEL_OUTOF10: 0

## 2024-06-09 NOTE — PLAN OF CARE
Problem: Discharge Planning  Goal: Discharge to home or other facility with appropriate resources  6/9/2024 0057 by Sole Reyes RN  Outcome: Progressing  Flowsheets (Taken 6/8/2024 2048)  Discharge to home or other facility with appropriate resources:   Identify barriers to discharge with patient and caregiver   Arrange for needed discharge resources and transportation as appropriate     Problem: Pain  Goal: Verbalizes/displays adequate comfort level or baseline comfort level  6/9/2024 0057 by Sole Reyes RN  Outcome: Progressing  Flowsheets (Taken 6/8/2024 2048)  Verbalizes/displays adequate comfort level or baseline comfort level:   Encourage patient to monitor pain and request assistance   Assess pain using appropriate pain scale   Administer analgesics based on type and severity of pain and evaluate response   Implement non-pharmacological measures as appropriate and evaluate response     Problem: Safety - Adult  Goal: Free from fall injury  6/9/2024 0057 by Sole Reyes RN  Outcome: Progressing     Problem: Skin/Tissue Integrity  Goal: Absence of new skin breakdown  Description: 1.  Monitor for areas of redness and/or skin breakdown  2.  Assess vascular access sites hourly  3.  Every 4-6 hours minimum:  Change oxygen saturation probe site  4.  Every 4-6 hours:  If on nasal continuous positive airway pressure, respiratory therapy assess nares and determine need for appliance change or resting period.  Outcome: Progressing     Problem: Chronic Conditions and Co-morbidities  Goal: Patient's chronic conditions and co-morbidity symptoms are monitored and maintained or improved  6/9/2024 0057 by Sole Reyes RN  Outcome: Progressing  Flowsheets (Taken 6/8/2024 2048)  Care Plan - Patient's Chronic Conditions and Co-Morbidity Symptoms are Monitored and Maintained or Improved: Monitor and assess patient's chronic conditions and comorbid symptoms for stability, deterioration, or

## 2024-06-09 NOTE — PROGRESS NOTES
Patient sitting up in chair. Patient alert and oriented  x 4 . Respirations regular and unlabored on room air. Patient denies the need for pain medications at the present time. \  Fall/safety precautions in place. Call light in reach.  Electronically signed by Cassi Potter RN on 6/8/2024 at 8:34 PM

## 2024-06-09 NOTE — PROGRESS NOTES
Patient sitting up in chair. Patient alert and oriented  x 4. Patient denies the need for   pain medication at the present time . Respirations regular and unlabored on room air.   Fall/safety precautions in place. Call light in reach.  Electronically signed by Cassi Potter RN on 6/8/2024 at 8:37 PM

## 2024-06-09 NOTE — PROGRESS NOTES
Checking on patient Q2H for nutrition needs, hygiene needs, comfort measures, mobility, fall risk interventions, and safe environment. All precautions and interventions in place. Educated patient on use of call light and telephone. Patient verbalizes understanding. Call light/telephone in reach.Electronically signed by JESSE PRASAD RN on 6/9/2024 at 11:16 AM

## 2024-06-09 NOTE — PROGRESS NOTES
Hospitalist Progress Note  6/9/2024 9:10 AM    PCP: Mayi Camarena PA    [id number]     Date of Admission: 6/5/2024                                                                                                                     HOSPITAL COURSE    Patient demographics:  The patient  Calixto Steen is a 68 y.o. male     Significant past medical history:   Patient Active Problem List   Diagnosis    Urinary tract infection without hematuria    Morbid obesity with BMI of 40.0-44.9, adult (HCC)    Anticoagulated    LISA (obstructive sleep apnea)    Uncontrolled type 2 diabetes mellitus with hyperglycemia (HCC)    Chronic diastolic heart failure (HCC)    PAF (paroxysmal atrial fibrillation) ([facility])    Tobacco abuse    Cervical radiculopathy    COPD (chronic obstructive pulmonary disease) ([facility])    Coronary atherosclerosis    Lymphedema of both lower extremities    Glaucoma    Hypothyroidism due to acquired atrophy of thyroid    Mixed hyperlipidemia    Primary hypertension    Chronic congestive heart failure (HCC)    Cellulitis of left lower extremity    Abnormal CT of the chest    Nocturnal hypoxia    Elevated d-dimer         Presenting symptoms:      Diagnostic workup:      CONSULTS DURING ADMISSION :   PHARMACY TO DOSE VANCOMYCIN  IP CONSULT TO HOSPITALIST  PHARMACY TO DOSE VANCOMYCIN  IP CONSULT TO SOCIAL WORK  IP CONSULT TO PULMONOLOGY  IP CONSULT TO ORTHOPEDIC SURGERY      Patient was diagnosed with:  Left lower extremity cellulitis      Treatment while inpatient:  68 years old male with medical history significant for chronic lymphedema hypertension diabetes mellitus diabetic neuropathy DVT on Eliquis        tobacco abuse and COPD  , previous CVA and hypothyroidism.      Patient presented with left lower extremity swelling and pain.  Patient was diagnosed with left lower extremity cellulitis.  Patient was started on treatment with cefepime which improved the cellulitis             Patient's CT chest was abnormal  anxiety, now resolved.    EKG initially showed sinus tachycardia, now improved.    Monitor on telemetry, as needed hydroxyzine.  - Continue lisinopril, Lasix      COPD  - Does not appear to be in acute exacerbation, placed on as needed albuterol, Dulera, Spiriva  - Counseled on tobacco cessation, nicotine patch ordered    - Unable to tolerate CPAP, will place on oxygen nightly  -Continue rest of chronic home meds      Code Status: Full Code        Dispo -Home in a.m.        The patient and / or the family were informed of the results of any tests, a time was given to answer questions, a plan was proposed and they agreed with plan.    USAMA SPEARS MD    This note was transcribed using Dragon Dictation software. Please disregard any translational errors.

## 2024-06-09 NOTE — PLAN OF CARE
Problem: Discharge Planning  Goal: Discharge to home or other facility with appropriate resources  6/9/2024 1116 by Kaylen Hanson RN  Outcome: Progressing  6/9/2024 0057 by Sole Reyes RN  Outcome: Progressing  Flowsheets (Taken 6/8/2024 2048)  Discharge to home or other facility with appropriate resources:   Identify barriers to discharge with patient and caregiver   Arrange for needed discharge resources and transportation as appropriate     Problem: Pain  Goal: Verbalizes/displays adequate comfort level or baseline comfort level  6/9/2024 1116 by Kaylen Hanson RN  Outcome: Progressing  6/9/2024 0057 by Sole Reyes RN  Outcome: Progressing  Flowsheets (Taken 6/8/2024 2048)  Verbalizes/displays adequate comfort level or baseline comfort level:   Encourage patient to monitor pain and request assistance   Assess pain using appropriate pain scale   Administer analgesics based on type and severity of pain and evaluate response   Implement non-pharmacological measures as appropriate and evaluate response     Problem: Safety - Adult  Goal: Free from fall injury  6/9/2024 1116 by Kaylen Hanson RN  Outcome: Progressing  6/9/2024 0057 by Sole Reyes RN  Outcome: Progressing     Problem: Skin/Tissue Integrity  Goal: Absence of new skin breakdown  Description: 1.  Monitor for areas of redness and/or skin breakdown  2.  Assess vascular access sites hourly  3.  Every 4-6 hours minimum:  Change oxygen saturation probe site  4.  Every 4-6 hours:  If on nasal continuous positive airway pressure, respiratory therapy assess nares and determine need for appliance change or resting period.  6/9/2024 1116 by Kaylen Hanson RN  Outcome: Progressing  6/9/2024 0057 by Sole Reyes RN  Outcome: Progressing     Problem: Chronic Conditions and Co-morbidities  Goal: Patient's chronic conditions and co-morbidity symptoms are monitored and maintained or improved  6/9/2024 1116 by Kaylen Hanson  RN  Outcome: Progressing  6/9/2024 0057 by Sole Reyes RN  Outcome: Progressing  Flowsheets (Taken 6/8/2024 2048)  Care Plan - Patient's Chronic Conditions and Co-Morbidity Symptoms are Monitored and Maintained or Improved: Monitor and assess patient's chronic conditions and comorbid symptoms for stability, deterioration, or improvement     Problem: ABCDS Injury Assessment  Goal: Absence of physical injury  6/9/2024 1116 by Kaylen Hanson RN  Outcome: Progressing  6/9/2024 0057 by Sole Reyes RN  Outcome: Progressing  Flowsheets (Taken 6/9/2024 0051)  Absence of Physical Injury: Implement safety measures based on patient assessment

## 2024-06-09 NOTE — PROGRESS NOTES
Pt AAO x4.  Sitting up in the chair.  No c/o pain.  Assessment completed.  CHELSEY LE with swelling and discoloration.  LLE>RLE.  Will apply Hydrocerin cream to legs after pt gets done with his shower. Denies any needs at this time.  Call light in reach.  Will monitor.

## 2024-06-10 VITALS
WEIGHT: 315 LBS | RESPIRATION RATE: 16 BRPM | HEART RATE: 59 BPM | SYSTOLIC BLOOD PRESSURE: 121 MMHG | BODY MASS INDEX: 45.1 KG/M2 | HEIGHT: 70 IN | DIASTOLIC BLOOD PRESSURE: 70 MMHG | TEMPERATURE: 98.2 F | OXYGEN SATURATION: 95 %

## 2024-06-10 LAB
GLUCOSE BLD-MCNC: 131 MG/DL (ref 70–99)
GLUCOSE BLD-MCNC: 143 MG/DL (ref 70–99)
PERFORMED ON: ABNORMAL
PERFORMED ON: ABNORMAL

## 2024-06-10 PROCEDURE — 2580000003 HC RX 258: Performed by: STUDENT IN AN ORGANIZED HEALTH CARE EDUCATION/TRAINING PROGRAM

## 2024-06-10 PROCEDURE — 94640 AIRWAY INHALATION TREATMENT: CPT

## 2024-06-10 PROCEDURE — 6370000000 HC RX 637 (ALT 250 FOR IP): Performed by: STUDENT IN AN ORGANIZED HEALTH CARE EDUCATION/TRAINING PROGRAM

## 2024-06-10 PROCEDURE — 2580000003 HC RX 258: Performed by: HOSPITALIST

## 2024-06-10 PROCEDURE — 94760 N-INVAS EAR/PLS OXIMETRY 1: CPT

## 2024-06-10 PROCEDURE — 6370000000 HC RX 637 (ALT 250 FOR IP): Performed by: NURSE PRACTITIONER

## 2024-06-10 PROCEDURE — 6370000000 HC RX 637 (ALT 250 FOR IP): Performed by: HOSPITALIST

## 2024-06-10 PROCEDURE — 6370000000 HC RX 637 (ALT 250 FOR IP): Performed by: INTERNAL MEDICINE

## 2024-06-10 PROCEDURE — 6360000002 HC RX W HCPCS: Performed by: HOSPITALIST

## 2024-06-10 RX ORDER — DOXYCYCLINE HYCLATE 100 MG
100 TABLET ORAL 2 TIMES DAILY
Qty: 10 TABLET | Refills: 0 | Status: SHIPPED | OUTPATIENT
Start: 2024-06-10 | End: 2024-06-15

## 2024-06-10 RX ORDER — FUROSEMIDE 40 MG/1
40 TABLET ORAL DAILY
Qty: 30 TABLET | Refills: 0 | Status: SHIPPED | OUTPATIENT
Start: 2024-06-10

## 2024-06-10 RX ADMIN — METHOCARBAMOL TABLETS 750 MG: 750 TABLET, COATED ORAL at 12:24

## 2024-06-10 RX ADMIN — SODIUM CHLORIDE, PRESERVATIVE FREE 10 ML: 5 INJECTION INTRAVENOUS at 09:01

## 2024-06-10 RX ADMIN — AZITHROMYCIN 250 MG: 250 TABLET, FILM COATED ORAL at 08:58

## 2024-06-10 RX ADMIN — CEFEPIME 2000 MG: 2 INJECTION, POWDER, FOR SOLUTION INTRAVENOUS at 01:51

## 2024-06-10 RX ADMIN — FUROSEMIDE 20 MG: 20 TABLET ORAL at 08:58

## 2024-06-10 RX ADMIN — METHOCARBAMOL TABLETS 750 MG: 750 TABLET, COATED ORAL at 08:58

## 2024-06-10 RX ADMIN — DICYCLOMINE HYDROCHLORIDE 20 MG: 20 TABLET ORAL at 12:24

## 2024-06-10 RX ADMIN — LEVOTHYROXINE SODIUM 50 MCG: 0.05 TABLET ORAL at 06:31

## 2024-06-10 RX ADMIN — MOMETASONE FUROATE AND FORMOTEROL FUMARATE DIHYDRATE 2 PUFF: 200; 5 AEROSOL RESPIRATORY (INHALATION) at 07:59

## 2024-06-10 RX ADMIN — DICYCLOMINE HYDROCHLORIDE 20 MG: 20 TABLET ORAL at 08:58

## 2024-06-10 RX ADMIN — GABAPENTIN 300 MG: 300 CAPSULE ORAL at 08:58

## 2024-06-10 RX ADMIN — APIXABAN 5 MG: 5 TABLET, FILM COATED ORAL at 08:58

## 2024-06-10 RX ADMIN — LISINOPRIL 10 MG: 10 TABLET ORAL at 08:58

## 2024-06-10 RX ADMIN — Medication 1 CAPSULE: at 08:58

## 2024-06-10 RX ADMIN — TIOTROPIUM BROMIDE INHALATION SPRAY 2 PUFF: 3.12 SPRAY, METERED RESPIRATORY (INHALATION) at 07:59

## 2024-06-10 NOTE — PROGRESS NOTES
[facility] Orthopedic Surgery   Progress Note      S/P :  SUBJECTIVE  in recliner. Awakens to name. Pain is   described in left knee and with the intensity of moderate with activity. Pain is described as aching.       OBJECTIVE              Physical                      VITALS:  /70   Pulse 59   Temp 98.2 °F (36.8 °C)   Resp 16   Ht 1.778 m (5' 10\")   Wt (!) 146 kg (321 lb 14 oz)   SpO2 95%   BMI 46.18 kg/m²                     MUSCULOSKELETAL:  left foot NVI. Wiggles toes to command. Able to plantarflex and dorsiflex ankle Pedal pulses are palpable. Left knee with no notable erythema or swelling.                    NEUROLOGIC:                                  Sensory:  Touch:  Left Lower Extremity:  normal                                            Data       CBC:   Lab Results   Component Value Date/Time    WBC 8.3 06/09/2024 04:08 AM    RBC 5.53 06/09/2024 04:08 AM    HGB 16.7 06/09/2024 04:08 AM    HCT 48.5 06/09/2024 04:08 AM    MCV 87.7 06/09/2024 04:08 AM    MCH 30.1 06/09/2024 04:08 AM    MCHC 34.3 06/09/2024 04:08 AM    RDW 14.2 06/09/2024 04:08 AM     06/09/2024 04:08 AM    MPV 8.7 06/09/2024 04:08 AM        WBC:    Lab Results   Component Value Date/Time    WBC 8.3 06/09/2024 04:08 AM        Hemoglobin/Hematocrit:    Lab Results   Component Value Date/Time    HGB 16.7 06/09/2024 04:08 AM    HCT 48.5 06/09/2024 04:08 AM        PT/INR:  No results found for: \"PROTIME\", \"INR\"           Current Inpatient Medications             Current Facility-Administered Medications: methocarbamol (ROBAXIN) tablet 750 mg, 750 mg, Oral, TID  furosemide (LASIX) tablet 20 mg, 20 mg, Oral, Daily  atorvastatin (LIPITOR) tablet 40 mg, 40 mg, Oral, QHS  lactobacillus (CULTURELLE) capsule 1 capsule, 1 capsule, Oral, BID WC  mometasone-formoterol (DULERA) 200-5 MCG/ACT inhaler 2 puff, 2 puff, Inhalation, BID RT **AND** tiotropium (SPIRIVA RESPIMAT) 2.5 MCG/ACT inhaler 2 puff, 2 puff, Inhalation, Daily RT  gabapentin  injection 1 mg, 1 mg, SubCUTAneous, PRN  dextrose 10 % infusion, , IntraVENous, Continuous PRN  insulin lispro (HUMALOG,ADMELOG) injection vial 0-4 Units, 0-4 Units, SubCUTAneous, TID WC  insulin lispro (HUMALOG,ADMELOG) injection vial 0-4 Units, 0-4 Units, SubCUTAneous, Nightly    ASSESSMENT AND PLAN    Left knee pain  Left knee advanced OA  Painful gait.   Discussed steroid injection for left knee pain. Pt reports he prefers to get the \"foam or gel\" injection. Explained will need to set this up with Dr Benitez as outpatient as cannot give these as inpatient in hospital. He agreed. Will provide office address and phone number for pt in [place] instruction. Pt informed.   May be activity as tolerated left knee    Jenn Jordan, JASON - CNP  6/10/2024  10:40 AM

## 2024-06-10 NOTE — DISCHARGE INSTRUCTIONS
Followup Dr Benitez  in office in one week regarding left knee pain  May weightbear on left knee as tolerated.   [facility], [address], [address]   [id number],   [phone]

## 2024-06-10 NOTE — PLAN OF CARE
Problem: Discharge Planning  Goal: Discharge to home or other facility with appropriate resources  6/10/2024 0924 by Brisa Arenas RN  Outcome: Progressing  Flowsheets (Taken 6/10/2024 0905)  Discharge to home or other facility with appropriate resources: Identify barriers to discharge with patient and caregiver     Problem: Pain  Goal: Verbalizes/displays adequate comfort level or baseline comfort level  6/10/2024 0924 by Brisa Arenas RN  Outcome: Progressing     Problem: Safety - Adult  Goal: Free from fall injury  6/10/2024 0924 by Brisa Arenas RN  Outcome: Progressing  Flowsheets (Taken 6/10/2024 0923)  Free From Fall Injury: Instruct family/caregiver on patient safety     Problem: Skin/Tissue Integrity  Goal: Absence of new skin breakdown  Description: 1.  Monitor for areas of redness and/or skin breakdown  2.  Assess vascular access sites hourly  3.  Every 4-6 hours minimum:  Change oxygen saturation probe site  4.  Every 4-6 hours:  If on nasal continuous positive airway pressure, respiratory therapy assess nares and determine need for appliance change or resting period.  6/10/2024 0924 by Brisa Arenas RN  Outcome: Progressing     Problem: Chronic Conditions and Co-morbidities  Goal: Patient's chronic conditions and co-morbidity symptoms are monitored and maintained or improved  6/10/2024 0924 by Brisa Arenas RN  Outcome: Progressing  Flowsheets (Taken 6/10/2024 0905)  Care Plan - Patient's Chronic Conditions and Co-Morbidity Symptoms are Monitored and Maintained or Improved: Monitor and assess patient's chronic conditions and comorbid symptoms for stability, deterioration, or improvement     Problem: ABCDS Injury Assessment  Goal: Absence of physical injury  6/10/2024 0924 by Brisa Arenas RN  Outcome: Progressing  Flowsheets (Taken 6/10/2024 0923)  Absence of Physical Injury: Implement safety measures based on patient assessment

## 2024-06-10 NOTE — PLAN OF CARE
Problem: Discharge Planning  Goal: Discharge to home or other facility with appropriate resources  6/9/2024 2143 by Sole Reyes RN  Outcome: Progressing  Flowsheets (Taken 6/9/2024 2135)  Discharge to home or other facility with appropriate resources:   Identify barriers to discharge with patient and caregiver   Arrange for needed discharge resources and transportation as appropriate     Problem: Pain  Goal: Verbalizes/displays adequate comfort level or baseline comfort level  6/9/2024 2143 by Sole Reyes RN  Outcome: Progressing     Problem: Safety - Adult  Goal: Free from fall injury  6/9/2024 2143 by Sole Reyes RN  Outcome: Progressing     Problem: Skin/Tissue Integrity  Goal: Absence of new skin breakdown  Description: 1.  Monitor for areas of redness and/or skin breakdown  2.  Assess vascular access sites hourly  3.  Every 4-6 hours minimum:  Change oxygen saturation probe site  4.  Every 4-6 hours:  If on nasal continuous positive airway pressure, respiratory therapy assess nares and determine need for appliance change or resting period.  6/9/2024 2143 by Sole Reyes RN  Outcome: Progressing     Problem: Chronic Conditions and Co-morbidities  Goal: Patient's chronic conditions and co-morbidity symptoms are monitored and maintained or improved  6/9/2024 2143 by Sole Reyes RN  Outcome: Progressing  Flowsheets (Taken 6/9/2024 2135)  Care Plan - Patient's Chronic Conditions and Co-Morbidity Symptoms are Monitored and Maintained or Improved: Monitor and assess patient's chronic conditions and comorbid symptoms for stability, deterioration, or improvement     Problem: ABCDS Injury Assessment  Goal: Absence of physical injury  6/9/2024 2143 by Sole Reyes RN  Outcome: Progressing  Flowsheets (Taken 6/9/2024 2138)  Absence of Physical Injury: Implement safety measures based on patient assessment

## 2024-06-10 NOTE — PROGRESS NOTES
Patient alert and oriented x4, discharged to home with documented belongings. IV removed with no complications. Transported out of [facility] by wheelchair. Reviewed discharge, follow up, and medication instructions with patient and patient verbalized understanding. Prescriptions filled by retail pharmacy and handed to patient.    Electronically signed by Brisa Arenas RN on 6/10/2024 at 12:49 PM

## 2024-06-10 NOTE — PROGRESS NOTES
Pt AAO x4.  No c/o pain.  Assessment completed.  Pt is going to take a shower then will put Hydrocerin cream to legs and wrap in ace wraps per order.  Pt denies any needs.  Call light in reach.  Will monitor.

## 2024-06-10 NOTE — PROGRESS NOTES
Patient is alert & oriented x4, x1 assist with walker, 2/4 bed rails up, bed in lowest position, fall precautions in place, call light within reach. Morning medications given without complications. Nicotine patch applied to L arm. BLE ACE wraps remain in place.    Electronically signed by Brisa Arenas RN on 6/10/2024 at 9:20 AM

## 2024-06-11 NOTE — DISCHARGE SUMMARY
Hospital Medicine Discharge Summary      Patient ID: Calixto Steen , [id number]     Patient's PCP: Mayi Camarena PA    Admit Date: 6/5/2024     Discharge Date: 6/10/2024      Admitting Physician: Robert Mayorga MD    Discharge Physician: USAMA SPEARS MD     Discharge Diagnoses:     Active Hospital Problems    Diagnosis Date Noted    Elevated d-dimer [R79.89] 06/08/2024    Abnormal CT of the chest [R93.89] 06/06/2024    Nocturnal hypoxia [G47.34] 06/06/2024    Cellulitis of left lower extremity [L03.116] 06/05/2024         The patient was seen and examined on the day of discharge and this discharge summary is in conjunction with any daily progress note from day of discharge.    Exam:     Gen:   Alert and oriented ×3    Eyes: PERRL. No sclera icterus. No conjunctival injection.   ENT: No discharge. Pharynx clear. External appearance of ears and nose normal.  Neck: Trachea midline. No obvious mass.    Resp: No accessory muscle use. No crackles. No wheezes. No rhonchi.  CV: Regular rate. Regular rhythm. No murmur or rub. No edema.   GI: Non-tender. Non-distended. No hernia.   Skin: Warm, dry, normal texture and turgor.   Lymph: No cervical LAD. No supraclavicular LAD.   M/S: / Ext.  Bilateral lower extremity swelling left worse than the right  Erythema and discoloration of venous insufficiency  Neuro: CN 2-12 are intact,  no neurologic deficits noted.        HOSPITAL COURSE    Patient demographics:  The patient  Calixto Steen is a 68 y.o. male      Significant past medical history:       Patient Active Problem List   Diagnosis    Urinary tract infection without hematuria    Morbid obesity with BMI of 40.0-44.9, adult (HCC)    Anticoagulated    LISA (obstructive sleep apnea)    Uncontrolled type 2 diabetes mellitus with hyperglycemia (HCC)    Chronic diastolic heart failure (HCC)    PAF (paroxysmal atrial fibrillation) (HCC)    Tobacco abuse    Cervical radiculopathy    COPD (chronic obstructive  Value Date/Time    WBC 8.3 06/09/2024 04:08 AM    HGB 16.7 06/09/2024 04:08 AM    HCT 48.5 06/09/2024 04:08 AM     06/09/2024 04:08 AM       RENAL:   Lab Results   Component Value Date/Time     06/09/2024 04:08 AM    K 3.7 06/09/2024 04:08 AM    K 4.0 06/06/2024 03:56 AM     06/09/2024 04:08 AM    CO2 24 06/09/2024 04:08 AM    BUN 11 06/09/2024 04:08 AM    CREATININE 0.8 06/09/2024 04:08 AM           Discharge Medications:      Medication List        START taking these medications      doxycycline hyclate 100 MG tablet  Commonly known as: VIBRA-TABS  Take 1 tablet by mouth 2 times daily for 5 days            CHANGE how you take these medications      furosemide 40 MG tablet  Commonly known as: Lasix  Take 1 tablet by mouth daily  What changed:   medication strength  See the new instructions.     gabapentin 300 MG capsule  Commonly known as: NEURONTIN  TAKE TWO CAPSULES BY MOUTH TWICE DAILY @9am & 5pm  What changed:   how much to take  how to take this  when to take this     Trulicity 3 MG/0.5ML Sopn  Generic drug: Dulaglutide  INJECT 3MG UNDER THE SKIN ONCE A WEEK  What changed:   how much to take  how to take this  when to take this  additional instructions            CONTINUE taking these medications      albuterol sulfate 108 (90 Base) MCG/ACT aerosol powder inhalation  Commonly known as: PROAIR RESPICLICK  Inhale 2 puffs into the lungs every 4 hours as needed for Wheezing or Shortness of Breath     apixaban 5 MG Tabs tablet  Commonly known as: ELIQUIS  Take 1 tablet by mouth 2 times daily     atorvastatin 40 MG tablet  Commonly known as: LIPITOR     dicyclomine 20 MG tablet  Commonly known as: BENTYL  Take 1 tablet by mouth 4 times daily     empagliflozin 25 MG tablet  Commonly known as: Jardiance  Take 1 tablet by mouth daily     hydrOXYzine HCl 25 MG tablet  Commonly known as: ATARAX  TAKE ONE TABLET BY MOUTH FOUR TIMES DAILY AS NEEDED FOR ITCHING (VIAL)     latanoprost 0.005 % ophthalmic

## 2024-06-16 NOTE — PROGRESS NOTES
Physician Progress Note      PATIENT:               CAROLEE ANGUIANO  [facility] #:                  [id number]  :                       1955  ADMIT DATE:       2024 5:24 PM  DISCH DATE:        6/10/2024 12:45 PM  RESPONDING  PROVIDER #:        Emelia Yanez MD          QUERY TEXT:    Internal Medicine,    Patient admitted with cellulitis and noted to have troponin elevation without   chest pain. If possible, please document in progress notes and discharge   summary if you are evaluating and/or treating any of the following:    The medical record reflects the following:  Risk Factors: Smoker  Clinical Indicators: troponin elevation 25, drop to 16, initially had chest   pain and SOB documented as due to anxiety and resolved per H&P and subsequent   notes  Treatment: labs, EKG, hydroxyzine, Lisinopril    Thank you,  Amanda Fairbanks RN [facility]  [phone]  Options provided:  -- This patient has demand ischemia with type 2 MI  -- This patient has demand ischemia without MI  -- MI and demand ischemia ruled out  -- Other - I will add my own diagnosis  -- Disagree - Not applicable / Not valid  -- Disagree - Clinically unable to determine / Unknown  -- Refer to Clinical Documentation Reviewer    PROVIDER RESPONSE TEXT:    This patient has demand ischemia with type 2 MI    Query created by: Amanda Fairbanks on 2024 10:05 AM      Electronically signed by:  Emelia Yanez MD 2024 5:55 PM

## 2024-07-10 ENCOUNTER — FOLLOWUP TELEPHONE ENCOUNTER (OUTPATIENT)
Dept: ORTHOPEDICS UNIT | Age: 69
End: 2024-07-10

## 2024-07-17 ENCOUNTER — FOLLOWUP TELEPHONE ENCOUNTER (OUTPATIENT)
Dept: ORTHOPEDICS UNIT | Age: 69
End: 2024-07-17

## 2025-06-27 ENCOUNTER — HOSPITAL ENCOUNTER (INPATIENT)
Age: 70
LOS: 4 days | Discharge: HOME | DRG: 603 | End: 2025-07-01
Attending: EMERGENCY MEDICINE | Admitting: FAMILY MEDICINE
Payer: MEDICARE

## 2025-06-27 ENCOUNTER — APPOINTMENT (OUTPATIENT)
Dept: GENERAL RADIOLOGY | Age: 70
DRG: 603 | End: 2025-06-27
Payer: MEDICARE

## 2025-06-27 DIAGNOSIS — L03.116 CELLULITIS OF LEFT LOWER EXTREMITY: Primary | ICD-10-CM

## 2025-06-27 DIAGNOSIS — T14.8XXA OPEN WOUND: ICD-10-CM

## 2025-06-27 DIAGNOSIS — B37.2 CANDIDIASIS, INTERTRIGINOUS: ICD-10-CM

## 2025-06-27 DIAGNOSIS — J18.9 PNEUMONIA DUE TO INFECTIOUS ORGANISM, UNSPECIFIED LATERALITY, UNSPECIFIED PART OF LUNG: ICD-10-CM

## 2025-06-27 PROBLEM — B87.9 MAGGOT INFESTATION: Status: ACTIVE | Noted: 2025-06-27

## 2025-06-27 PROBLEM — E11.622 TYPE 2 DIABETES MELLITUS WITH OTHER SKIN ULCER (HCC): Status: ACTIVE | Noted: 2025-06-27

## 2025-06-27 PROBLEM — I83.022 VENOUS STASIS ULCER OF LEFT CALF WITH FAT LAYER EXPOSED WITH VARICOSE VEINS (HCC): Status: ACTIVE | Noted: 2025-06-27

## 2025-06-27 PROBLEM — L97.222 VENOUS STASIS ULCER OF LEFT CALF WITH FAT LAYER EXPOSED WITH VARICOSE VEINS (HCC): Status: ACTIVE | Noted: 2025-06-27

## 2025-06-27 PROBLEM — L98.499 TYPE 2 DIABETES MELLITUS WITH OTHER SKIN ULCER (HCC): Status: ACTIVE | Noted: 2025-06-27

## 2025-06-27 PROBLEM — Z87.891 SMOKING HX: Status: ACTIVE | Noted: 2025-06-27

## 2025-06-27 PROBLEM — I89.0 LYMPH EDEMA: Status: ACTIVE | Noted: 2025-06-27

## 2025-06-27 PROBLEM — R60.0 LOWER LEG EDEMA: Status: ACTIVE | Noted: 2025-06-27

## 2025-06-27 PROBLEM — L03.90 CELLULITIS: Status: ACTIVE | Noted: 2025-06-27

## 2025-06-27 PROBLEM — R46.0 POOR PERSONAL HYGIENE: Status: ACTIVE | Noted: 2025-06-27

## 2025-06-27 PROBLEM — E66.01 MORBID OBESITY WITH BMI OF 45.0-49.9, ADULT (HCC): Status: ACTIVE | Noted: 2025-06-27

## 2025-06-27 LAB
ALBUMIN SERPL-MCNC: 4 G/DL (ref 3.4–5)
ALBUMIN/GLOB SERPL: 1.3 {RATIO} (ref 1.1–2.2)
ALP SERPL-CCNC: 126 U/L (ref 40–129)
ALT SERPL-CCNC: 21 U/L (ref 10–40)
ANION GAP SERPL CALCULATED.3IONS-SCNC: 12 MMOL/L (ref 3–16)
AST SERPL-CCNC: 21 U/L (ref 15–37)
BASOPHILS # BLD: 0.1 K/UL (ref 0–0.2)
BASOPHILS NFR BLD: 0.6 %
BILIRUB SERPL-MCNC: 1.1 MG/DL (ref 0–1)
BUN SERPL-MCNC: 14 MG/DL (ref 7–20)
CALCIUM SERPL-MCNC: 8.8 MG/DL (ref 8.3–10.6)
CHLORIDE SERPL-SCNC: 103 MMOL/L (ref 99–110)
CO2 SERPL-SCNC: 21 MMOL/L (ref 21–32)
CREAT SERPL-MCNC: 1 MG/DL (ref 0.8–1.3)
DEPRECATED RDW RBC AUTO: 14.7 % (ref 12.4–15.4)
EKG ATRIAL RATE: 98 BPM
EKG DIAGNOSIS: NORMAL
EKG P AXIS: 70 DEGREES
EKG P-R INTERVAL: 222 MS
EKG Q-T INTERVAL: 340 MS
EKG QRS DURATION: 84 MS
EKG QTC CALCULATION (BAZETT): 434 MS
EKG R AXIS: -14 DEGREES
EKG T AXIS: 70 DEGREES
EKG VENTRICULAR RATE: 98 BPM
EOSINOPHIL # BLD: 0.2 K/UL (ref 0–0.6)
EOSINOPHIL NFR BLD: 1.6 %
GFR SERPLBLD CREATININE-BSD FMLA CKD-EPI: 80 ML/MIN/{1.73_M2}
GLUCOSE SERPL-MCNC: 109 MG/DL (ref 70–99)
HCT VFR BLD AUTO: 48.9 % (ref 40.5–52.5)
HGB BLD-MCNC: 16.9 G/DL (ref 13.5–17.5)
LACTATE BLDV-SCNC: 0.9 MMOL/L (ref 0.4–1.9)
LYMPHOCYTES # BLD: 1 K/UL (ref 1–5.1)
LYMPHOCYTES NFR BLD: 10.5 %
MCH RBC QN AUTO: 29.7 PG (ref 26–34)
MCHC RBC AUTO-ENTMCNC: 34.5 G/DL (ref 31–36)
MCV RBC AUTO: 86 FL (ref 80–100)
MONOCYTES # BLD: 1.1 K/UL (ref 0–1.3)
MONOCYTES NFR BLD: 11.1 %
NEUTROPHILS # BLD: 7.4 K/UL (ref 1.7–7.7)
NEUTROPHILS NFR BLD: 76.2 %
NT-PROBNP SERPL-MCNC: 54 PG/ML (ref 0–124)
PLATELET # BLD AUTO: 195 K/UL (ref 135–450)
PMV BLD AUTO: 8.2 FL (ref 5–10.5)
POTASSIUM SERPL-SCNC: 3.8 MMOL/L (ref 3.5–5.1)
PROT SERPL-MCNC: 7.2 G/DL (ref 6.4–8.2)
RBC # BLD AUTO: 5.68 M/UL (ref 4.2–5.9)
SODIUM SERPL-SCNC: 136 MMOL/L (ref 136–145)
TROPONIN, HIGH SENSITIVITY: 21 NG/L (ref 0–22)
WBC # BLD AUTO: 9.7 K/UL (ref 4–11)

## 2025-06-27 PROCEDURE — 87205 SMEAR GRAM STAIN: CPT

## 2025-06-27 PROCEDURE — 97530 THERAPEUTIC ACTIVITIES: CPT

## 2025-06-27 PROCEDURE — 71045 X-RAY EXAM CHEST 1 VIEW: CPT

## 2025-06-27 PROCEDURE — 87040 BLOOD CULTURE FOR BACTERIA: CPT

## 2025-06-27 PROCEDURE — 87077 CULTURE AEROBIC IDENTIFY: CPT

## 2025-06-27 PROCEDURE — 6360000002 HC RX W HCPCS: Performed by: FAMILY MEDICINE

## 2025-06-27 PROCEDURE — 97166 OT EVAL MOD COMPLEX 45 MIN: CPT

## 2025-06-27 PROCEDURE — 6370000000 HC RX 637 (ALT 250 FOR IP): Performed by: FAMILY MEDICINE

## 2025-06-27 PROCEDURE — 99223 1ST HOSP IP/OBS HIGH 75: CPT | Performed by: INTERNAL MEDICINE

## 2025-06-27 PROCEDURE — 96368 THER/DIAG CONCURRENT INF: CPT

## 2025-06-27 PROCEDURE — 87641 MR-STAPH DNA AMP PROBE: CPT

## 2025-06-27 PROCEDURE — 6370000000 HC RX 637 (ALT 250 FOR IP): Performed by: EMERGENCY MEDICINE

## 2025-06-27 PROCEDURE — 0HBRXZZ EXCISION OF TOE NAIL, EXTERNAL APPROACH: ICD-10-PCS | Performed by: PODIATRIST

## 2025-06-27 PROCEDURE — 2500000003 HC RX 250 WO HCPCS: Performed by: FAMILY MEDICINE

## 2025-06-27 PROCEDURE — 94760 N-INVAS EAR/PLS OXIMETRY 1: CPT

## 2025-06-27 PROCEDURE — 80053 COMPREHEN METABOLIC PANEL: CPT

## 2025-06-27 PROCEDURE — 99285 EMERGENCY DEPT VISIT HI MDM: CPT

## 2025-06-27 PROCEDURE — G0545 PR INHERENT VISIT TO INPT: HCPCS | Performed by: INTERNAL MEDICINE

## 2025-06-27 PROCEDURE — 2580000003 HC RX 258: Performed by: FAMILY MEDICINE

## 2025-06-27 PROCEDURE — 93010 ELECTROCARDIOGRAM REPORT: CPT | Performed by: INTERNAL MEDICINE

## 2025-06-27 PROCEDURE — 87070 CULTURE OTHR SPECIMN AEROBIC: CPT

## 2025-06-27 PROCEDURE — 83880 ASSAY OF NATRIURETIC PEPTIDE: CPT

## 2025-06-27 PROCEDURE — 84484 ASSAY OF TROPONIN QUANT: CPT

## 2025-06-27 PROCEDURE — 87075 CULTR BACTERIA EXCEPT BLOOD: CPT

## 2025-06-27 PROCEDURE — 94640 AIRWAY INHALATION TREATMENT: CPT

## 2025-06-27 PROCEDURE — 83605 ASSAY OF LACTIC ACID: CPT

## 2025-06-27 PROCEDURE — 1200000000 HC SEMI PRIVATE

## 2025-06-27 PROCEDURE — 97162 PT EVAL MOD COMPLEX 30 MIN: CPT

## 2025-06-27 PROCEDURE — 96365 THER/PROPH/DIAG IV INF INIT: CPT

## 2025-06-27 PROCEDURE — 6360000002 HC RX W HCPCS: Performed by: EMERGENCY MEDICINE

## 2025-06-27 PROCEDURE — 2580000003 HC RX 258: Performed by: EMERGENCY MEDICINE

## 2025-06-27 PROCEDURE — 99222 1ST HOSP IP/OBS MODERATE 55: CPT | Performed by: SURGERY

## 2025-06-27 PROCEDURE — 93005 ELECTROCARDIOGRAM TRACING: CPT | Performed by: STUDENT IN AN ORGANIZED HEALTH CARE EDUCATION/TRAINING PROGRAM

## 2025-06-27 PROCEDURE — 96375 TX/PRO/DX INJ NEW DRUG ADDON: CPT

## 2025-06-27 PROCEDURE — 87186 SC STD MICRODIL/AGAR DIL: CPT

## 2025-06-27 PROCEDURE — 85025 COMPLETE CBC W/AUTO DIFF WBC: CPT

## 2025-06-27 RX ORDER — GABAPENTIN 300 MG/1
300 CAPSULE ORAL 2 TIMES DAILY
Status: DISCONTINUED | OUTPATIENT
Start: 2025-06-27 | End: 2025-06-27

## 2025-06-27 RX ORDER — GABAPENTIN 300 MG/1
300 CAPSULE ORAL ONCE
Status: COMPLETED | OUTPATIENT
Start: 2025-06-27 | End: 2025-06-27

## 2025-06-27 RX ORDER — MINERAL OIL/HYDROPHIL PETROLAT
OINTMENT (GRAM) TOPICAL DAILY
Status: DISCONTINUED | OUTPATIENT
Start: 2025-06-27 | End: 2025-07-01 | Stop reason: HOSPADM

## 2025-06-27 RX ORDER — ACETAMINOPHEN 325 MG/1
650 TABLET ORAL EVERY 6 HOURS PRN
Status: DISCONTINUED | OUTPATIENT
Start: 2025-06-27 | End: 2025-07-01 | Stop reason: HOSPADM

## 2025-06-27 RX ORDER — LACTOBACILLUS RHAMNOSUS GG 10B CELL
1 CAPSULE ORAL
Status: DISCONTINUED | OUTPATIENT
Start: 2025-06-27 | End: 2025-07-01 | Stop reason: HOSPADM

## 2025-06-27 RX ORDER — ATORVASTATIN CALCIUM 40 MG/1
40 TABLET, FILM COATED ORAL
Status: DISCONTINUED | OUTPATIENT
Start: 2025-06-27 | End: 2025-07-01 | Stop reason: HOSPADM

## 2025-06-27 RX ORDER — HYDROXYZINE HYDROCHLORIDE 25 MG/1
25 TABLET, FILM COATED ORAL 4 TIMES DAILY PRN
Status: DISCONTINUED | OUTPATIENT
Start: 2025-06-27 | End: 2025-07-01 | Stop reason: HOSPADM

## 2025-06-27 RX ORDER — FUROSEMIDE 40 MG/1
40 TABLET ORAL DAILY
Status: DISCONTINUED | OUTPATIENT
Start: 2025-06-27 | End: 2025-06-27

## 2025-06-27 RX ORDER — NYSTATIN 100000 U/G
CREAM TOPICAL ONCE
Status: COMPLETED | OUTPATIENT
Start: 2025-06-27 | End: 2025-06-27

## 2025-06-27 RX ORDER — LATANOPROST 50 UG/ML
1 SOLUTION/ DROPS OPHTHALMIC NIGHTLY
Status: DISCONTINUED | OUTPATIENT
Start: 2025-06-27 | End: 2025-07-01 | Stop reason: HOSPADM

## 2025-06-27 RX ORDER — FLUCONAZOLE 100 MG/1
200 TABLET ORAL DAILY
Status: COMPLETED | OUTPATIENT
Start: 2025-06-27 | End: 2025-07-01

## 2025-06-27 RX ORDER — FUROSEMIDE 10 MG/ML
40 INJECTION INTRAMUSCULAR; INTRAVENOUS 2 TIMES DAILY
Status: DISCONTINUED | OUTPATIENT
Start: 2025-06-27 | End: 2025-07-01 | Stop reason: HOSPADM

## 2025-06-27 RX ORDER — POTASSIUM CHLORIDE 1500 MG/1
40 TABLET, EXTENDED RELEASE ORAL PRN
Status: DISCONTINUED | OUTPATIENT
Start: 2025-06-27 | End: 2025-07-01 | Stop reason: HOSPADM

## 2025-06-27 RX ORDER — ENOXAPARIN SODIUM 100 MG/ML
40 INJECTION SUBCUTANEOUS 2 TIMES DAILY
Status: DISCONTINUED | OUTPATIENT
Start: 2025-06-27 | End: 2025-06-29

## 2025-06-27 RX ORDER — DICYCLOMINE HCL 20 MG
20 TABLET ORAL 4 TIMES DAILY
Status: DISCONTINUED | OUTPATIENT
Start: 2025-06-27 | End: 2025-06-27

## 2025-06-27 RX ORDER — SODIUM CHLORIDE 0.9 % (FLUSH) 0.9 %
5-40 SYRINGE (ML) INJECTION EVERY 12 HOURS SCHEDULED
Status: DISCONTINUED | OUTPATIENT
Start: 2025-06-27 | End: 2025-07-01 | Stop reason: HOSPADM

## 2025-06-27 RX ORDER — METRONIDAZOLE 500 MG/100ML
500 INJECTION, SOLUTION INTRAVENOUS EVERY 8 HOURS
Status: DISCONTINUED | OUTPATIENT
Start: 2025-06-27 | End: 2025-06-30

## 2025-06-27 RX ORDER — MAGNESIUM SULFATE IN WATER 40 MG/ML
2000 INJECTION, SOLUTION INTRAVENOUS PRN
Status: DISCONTINUED | OUTPATIENT
Start: 2025-06-27 | End: 2025-07-01 | Stop reason: HOSPADM

## 2025-06-27 RX ORDER — GABAPENTIN 300 MG/1
600 CAPSULE ORAL 2 TIMES DAILY
Status: DISCONTINUED | OUTPATIENT
Start: 2025-06-27 | End: 2025-07-01 | Stop reason: HOSPADM

## 2025-06-27 RX ORDER — ACETAMINOPHEN 650 MG/1
650 SUPPOSITORY RECTAL EVERY 6 HOURS PRN
Status: DISCONTINUED | OUTPATIENT
Start: 2025-06-27 | End: 2025-07-01 | Stop reason: HOSPADM

## 2025-06-27 RX ORDER — ONDANSETRON 4 MG/1
4 TABLET, ORALLY DISINTEGRATING ORAL EVERY 8 HOURS PRN
Status: DISCONTINUED | OUTPATIENT
Start: 2025-06-27 | End: 2025-07-01 | Stop reason: HOSPADM

## 2025-06-27 RX ORDER — BUDESONIDE AND FORMOTEROL FUMARATE DIHYDRATE 80; 4.5 UG/1; UG/1
2 AEROSOL RESPIRATORY (INHALATION)
Status: DISCONTINUED | OUTPATIENT
Start: 2025-06-27 | End: 2025-07-01 | Stop reason: HOSPADM

## 2025-06-27 RX ORDER — POTASSIUM CHLORIDE 7.45 MG/ML
10 INJECTION INTRAVENOUS PRN
Status: DISCONTINUED | OUTPATIENT
Start: 2025-06-27 | End: 2025-07-01 | Stop reason: HOSPADM

## 2025-06-27 RX ORDER — POLYETHYLENE GLYCOL 3350 17 G/17G
17 POWDER, FOR SOLUTION ORAL DAILY PRN
Status: DISCONTINUED | OUTPATIENT
Start: 2025-06-27 | End: 2025-07-01 | Stop reason: HOSPADM

## 2025-06-27 RX ORDER — SODIUM CHLORIDE 0.9 % (FLUSH) 0.9 %
5-40 SYRINGE (ML) INJECTION PRN
Status: DISCONTINUED | OUTPATIENT
Start: 2025-06-27 | End: 2025-07-01 | Stop reason: HOSPADM

## 2025-06-27 RX ORDER — MICONAZOLE NITRATE 2 G/100G
CREAM TOPICAL ONCE
Status: DISCONTINUED | OUTPATIENT
Start: 2025-06-27 | End: 2025-06-27 | Stop reason: RX

## 2025-06-27 RX ORDER — SERTRALINE HYDROCHLORIDE 25 MG/1
25 TABLET, FILM COATED ORAL DAILY
COMMUNITY
Start: 2025-06-02

## 2025-06-27 RX ORDER — LISINOPRIL 10 MG/1
10 TABLET ORAL DAILY
Status: DISCONTINUED | OUTPATIENT
Start: 2025-06-27 | End: 2025-07-01 | Stop reason: HOSPADM

## 2025-06-27 RX ORDER — NICOTINE 21 MG/24HR
1 PATCH, TRANSDERMAL 24 HOURS TRANSDERMAL DAILY
Status: DISCONTINUED | OUTPATIENT
Start: 2025-06-27 | End: 2025-07-01 | Stop reason: HOSPADM

## 2025-06-27 RX ORDER — SODIUM CHLORIDE 9 MG/ML
INJECTION, SOLUTION INTRAVENOUS PRN
Status: DISCONTINUED | OUTPATIENT
Start: 2025-06-27 | End: 2025-07-01 | Stop reason: HOSPADM

## 2025-06-27 RX ORDER — LEVOTHYROXINE SODIUM 50 UG/1
50 TABLET ORAL DAILY
Status: DISCONTINUED | OUTPATIENT
Start: 2025-06-27 | End: 2025-07-01 | Stop reason: HOSPADM

## 2025-06-27 RX ORDER — ONDANSETRON 2 MG/ML
4 INJECTION INTRAMUSCULAR; INTRAVENOUS EVERY 6 HOURS PRN
Status: DISCONTINUED | OUTPATIENT
Start: 2025-06-27 | End: 2025-07-01 | Stop reason: HOSPADM

## 2025-06-27 RX ADMIN — GABAPENTIN 300 MG: 300 CAPSULE ORAL at 14:23

## 2025-06-27 RX ADMIN — NYSTATIN: 100000 CREAM TOPICAL at 08:09

## 2025-06-27 RX ADMIN — LISINOPRIL 10 MG: 10 TABLET ORAL at 11:31

## 2025-06-27 RX ADMIN — VANCOMYCIN HYDROCHLORIDE 2500 MG: 1 INJECTION, POWDER, LYOPHILIZED, FOR SOLUTION INTRAVENOUS at 07:32

## 2025-06-27 RX ADMIN — METRONIDAZOLE 500 MG: 500 INJECTION, SOLUTION INTRAVENOUS at 20:20

## 2025-06-27 RX ADMIN — PIPERACILLIN AND TAZOBACTAM 3375 MG: 3; .375 INJECTION, POWDER, LYOPHILIZED, FOR SOLUTION INTRAVENOUS at 06:56

## 2025-06-27 RX ADMIN — TIOTROPIUM BROMIDE INHALATION SPRAY 5 MCG: 3.12 SPRAY, METERED RESPIRATORY (INHALATION) at 10:17

## 2025-06-27 RX ADMIN — Medication 1 CAPSULE: at 11:27

## 2025-06-27 RX ADMIN — ENOXAPARIN SODIUM 40 MG: 100 INJECTION SUBCUTANEOUS at 20:15

## 2025-06-27 RX ADMIN — ATORVASTATIN CALCIUM 40 MG: 40 TABLET, FILM COATED ORAL at 20:14

## 2025-06-27 RX ADMIN — FUROSEMIDE 40 MG: 10 INJECTION, SOLUTION INTRAMUSCULAR; INTRAVENOUS at 17:47

## 2025-06-27 RX ADMIN — METRONIDAZOLE 500 MG: 500 INJECTION, SOLUTION INTRAVENOUS at 11:19

## 2025-06-27 RX ADMIN — GABAPENTIN 600 MG: 300 CAPSULE ORAL at 20:13

## 2025-06-27 RX ADMIN — FUROSEMIDE 40 MG: 10 INJECTION, SOLUTION INTRAMUSCULAR; INTRAVENOUS at 11:25

## 2025-06-27 RX ADMIN — CEFEPIME 2000 MG: 2 INJECTION, POWDER, FOR SOLUTION INTRAVENOUS at 11:15

## 2025-06-27 RX ADMIN — SODIUM CHLORIDE, PRESERVATIVE FREE 10 ML: 5 INJECTION INTRAVENOUS at 20:15

## 2025-06-27 RX ADMIN — BUDESONIDE AND FORMOTEROL FUMARATE DIHYDRATE 2 PUFF: 80; 4.5 AEROSOL RESPIRATORY (INHALATION) at 10:19

## 2025-06-27 RX ADMIN — WHITE PETROLATUM: 1.75 OINTMENT TOPICAL at 14:24

## 2025-06-27 RX ADMIN — FLUCONAZOLE 200 MG: 100 TABLET ORAL at 11:41

## 2025-06-27 RX ADMIN — BUDESONIDE AND FORMOTEROL FUMARATE DIHYDRATE 2 PUFF: 80; 4.5 AEROSOL RESPIRATORY (INHALATION) at 19:19

## 2025-06-27 RX ADMIN — LEVOTHYROXINE SODIUM 50 MCG: 0.05 TABLET ORAL at 11:31

## 2025-06-27 RX ADMIN — HYDROMORPHONE HYDROCHLORIDE 1 MG: 1 INJECTION, SOLUTION INTRAMUSCULAR; INTRAVENOUS; SUBCUTANEOUS at 20:14

## 2025-06-27 RX ADMIN — ENOXAPARIN SODIUM 40 MG: 100 INJECTION SUBCUTANEOUS at 11:41

## 2025-06-27 RX ADMIN — HYDROMORPHONE HYDROCHLORIDE 1 MG: 1 INJECTION, SOLUTION INTRAMUSCULAR; INTRAVENOUS; SUBCUTANEOUS at 07:00

## 2025-06-27 RX ADMIN — CEFEPIME 2000 MG: 2 INJECTION, POWDER, FOR SOLUTION INTRAVENOUS at 11:23

## 2025-06-27 RX ADMIN — GABAPENTIN 300 MG: 300 CAPSULE ORAL at 11:31

## 2025-06-27 RX ADMIN — CEFEPIME 2000 MG: 2 INJECTION, POWDER, FOR SOLUTION INTRAVENOUS at 22:21

## 2025-06-27 ASSESSMENT — PAIN - FUNCTIONAL ASSESSMENT: PAIN_FUNCTIONAL_ASSESSMENT: NONE - DENIES PAIN

## 2025-06-27 ASSESSMENT — PAIN SCALES - GENERAL
PAINLEVEL_OUTOF10: 2
PAINLEVEL_OUTOF10: 9

## 2025-06-27 ASSESSMENT — PAIN DESCRIPTION - ORIENTATION
ORIENTATION: RIGHT;LEFT
ORIENTATION: LEFT

## 2025-06-27 ASSESSMENT — PAIN DESCRIPTION - LOCATION
LOCATION: FOOT;LEG
LOCATION: COCCYX;LEG

## 2025-06-27 NOTE — H&P
V2.0  History and Physical      Name:  Calixto Steen /Age/Sex: 1955  (69 y.o. male)   MRN & CSN:  [id number] & [id number] Encounter Date/Time: 2025 8:14 AM EDT   Location:   PCP: Mayi Camarena PA       Hospital Day: 1    Assessment and Plan:   Calixto Steen is a 69 y.o. male  who presents with Cellulitis    Hospital Problems           Last Modified POA    * (Principal) Cellulitis 2025 Yes       Plan:    Left leg cellulitis with maggots  Severe left leg lymphedema  - Cefepime, flagyl IV  - consult ID  - consult vascular surgery  - wound care  - podiatry  - hold Eliquis for now until confirmed that there is no surgery needed  - change oral lasix to 40mg IV BID for now to help with swelling  - probiotic    Intertriginous dermatitis  - topical and oral diflucan    COPD:  - change inhaler to home formulary symbicort and spiriva    Morbid obesity:  BMI 48  - increases morbidity and mortality risk  - weight loss counseling as appropriate    Hypothyroidism:  cont synthroid  Chronic pain:  cont gabapentin    Disposition:   Current Living situation: home  Expected Disposition: home  Estimated D/C: 3-4 days    Diet No diet orders on file   DVT Prophylaxis [] Lovenox, []  Heparin, [] SCDs, [] Ambulation,  [] Eliquis, [] Xarelto, [] Coumadin   Code Status Full Code   Surrogate Decision Maker/ POA In the chart     Personally reviewed Lab Studies and Imaging     Discussed management of the case with ED provider who recommended treatment of maggot infested cellulitis    Drugs that require monitoring for toxicity include IV cefepime and the method of monitoring was electrolytes        History from:     patient    History of Present Illness:     Chief Complaint: maggot infested leg wound  Calixto Steen is a 69 y.o. male who presents with worsening pain, swelling, and infection of left lower leg.  He states he has had severe venous stasis with dermatitis for many years.  In the past two weeks, he has had  12/19/2023 02:44 PM     Urine Cultures:   Lab Results   Component Value Date/Time    LABURIN  06/05/2024 09:25 PM     <10,000 CFU/ml mixed skin/urogenital aliza. No further workup    LABURIN 75,000 CFU/ml 06/05/2024 09:25 PM     Blood Cultures:   Lab Results   Component Value Date/Time    BC No Growth after 4 days of incubation. 09/21/2023 12:10 PM     Lab Results   Component Value Date/Time    BLOODCULT2 No Growth after 4 days of incubation. 09/21/2023 02:33 PM     Organism:   Lab Results   Component Value Date/Time    ORG Proteus mirabilis 06/05/2024 09:25 PM       Imaging/Diagnostics Last 24 Hours   XR CHEST PORTABLE  Result Date: 6/27/2025  EXAMINATION: ONE XRAY VIEW OF THE CHEST 6/27/2025 6:13 am COMPARISON: 06/05/2025 HISTORY: ORDERING SYSTEM PROVIDED HISTORY: Chest Pain TECHNOLOGIST PROVIDED HISTORY: \"IF\" patient is hemodynamically unstable. Indication: Chest Pain Reason for exam:->Chest Pain Reason for Exam: chest pain FINDINGS: Low lung volumes.  Increasing reticular interstitial lung markings with more focal airspace opacity in the right perihilar region.  Stable cardiac silhouette.  No obvious pleural effusion or pneumothorax.  Osseous structures are stable.     Increasing reticular interstitial lung markings with more focal airspace opacity in the right perihilar region. Findings may represent pulmonary edema versus atypical pneumonia.         Electronically signed by Racheal Lan MD on 6/27/2025 at 8:14 AM

## 2025-06-27 NOTE — ED NOTES
ED TO INPATIENT SBAR HANDOFF    Patient Name: Calixto Steen   Preferred Name: Calixto  : 1955  69 y.o.   Family/Caregiver Present: no   Code Status Order: Full Code  PO Status: NPO:No  Telemetry Order: No  C-SSRS: Risk of Suicide: No Risk  Sitter no    Restraints:     Sepsis Risk Score      Situation  Chief Complaint   Patient presents with    Chest Pain    Leg Pain     Pt present in the ED via ems from home c/o cp. Pt states that he woke and saw a bunch of maggot on his leg. Pt also a cp that started when he was walking around. Pt AXO 4, VSS.     Brief Description of Patient's Condition: Pt resting in chair comfortably. Resprations even and unlabored, no distress noted. Legs/feet wrapper per MD orders. Cream applied. Pt states he wants to wait to apply poweder and would like to shower first. A&Ox4, room air.   Mental Status: oriented  Arrived from:Home  Imaging:   XR CHEST PORTABLE   Final Result   Increasing reticular interstitial lung markings with more focal airspace   opacity in the right perihilar region. Findings may represent pulmonary edema   versus atypical pneumonia.           Abnormal labs:   Abnormal Labs Reviewed   COMPREHENSIVE METABOLIC PANEL W/ REFLEX TO MG FOR LOW K - Abnormal; Notable for the following components:       Result Value    Glucose 109 (*)     Total Bilirubin 1.1 (*)     All other components within normal limits       Background  Allergies: No Known Allergies  History:   Past Medical History:   Diagnosis Date    Cellulitis 2023    Cerebrovascular accident (HCC) 2022    Chest pain 2023    Chest pain, rule out acute myocardial infarction 2022    Chronic deep vein thrombosis (DVT) of femoral vein of left lower extremity (HCC) 2020    Chronic diastolic CHF (congestive heart failure) (HCC)     DM (diabetes mellitus), type 2 (HCC)     HTN (hypertension)     Neutrophilia 2023    Pain of right lower extremity 2023    Sleep apnea

## 2025-06-27 NOTE — CARE COORDINATION
Wound Referral Progress Note       NAME:  Calixto Steen  MEDICAL RECORD NUMBER:  [id number]  AGE: 69 y.o.   GENDER: male  : 1955  TODAY'S DATE:  2025    Subjective   Reason for WOCN Evaluation and Assessment: GARY Steen is a 69 y.o. male referred by:   Provider and Nursing    Wound Identification:  Wound Type: venous  Contributing Factors: venous stasis    Wound History: pt reports wounds for a few months, maggots were present for a couple of weeks. He tells me he was able to get them all out prior to coming to hospital  Current Wound Care Treatment:  dry dressing    Patient Care Goal:  Wound Healing        PAST MEDICAL HISTORY        Diagnosis Date    Cellulitis 2023    Cerebrovascular accident (HCC) 2022    Chest pain 2023    Chest pain, rule out acute myocardial infarction 2022    Chronic deep vein thrombosis (DVT) of femoral vein of left lower extremity (HCC) 2020    Chronic diastolic CHF (congestive heart failure) ([facility])     DM (diabetes mellitus), type 2 ([facility])     HTN (hypertension)     Neutrophilia 2023    Pain of right lower extremity 2023    Sleep apnea        PAST SURGICAL HISTORY    History reviewed. No pertinent surgical history.    FAMILY HISTORY    History reviewed. No pertinent family history.    SOCIAL HISTORY    Social History     Tobacco Use    Smoking status: Former     Current packs/day: 3.00     Types: Cigarettes    Smokeless tobacco: Never   Substance Use Topics    Alcohol use: Never    Drug use: Never       ALLERGIES    No Known Allergies    MEDICATIONS    No current facility-administered medications on file prior to encounter.     Current Outpatient Medications on File Prior to Encounter   Medication Sig Dispense Refill    furosemide (LASIX) 20 MG tablet TAKE ONE TABLET BY MOUTH DAILY AT 9 AM 30 tablet 0    hydrOXYzine HCl (ATARAX) 25 MG tablet TAKE ONE TABLET BY MOUTH FOUR TIMES DAILY AS NEEDED FOR ITCHING 30 tablet 0     MG tablet Take 1 tablet by mouth every 8 hours as needed for Nausea or Vomiting         Objective    /69   Pulse 83   Temp 98.6 °F (37 °C)   Resp 16   Ht 1.829 m (6')   Wt (!) 163.3 kg (360 lb)   SpO2 91%   BMI 48.82 kg/m²     LABS:  WBC:    Lab Results   Component Value Date/Time    WBC 9.7 06/27/2025 06:15 AM     H/H:    Lab Results   Component Value Date/Time    HGB 16.9 06/27/2025 06:15 AM    HCT 48.9 06/27/2025 06:15 AM     PTT:  No results found for: \"APTT\"[APTT}  PT/INR:  No results found for: \"PROTIME\", \"INR\"  HgBA1c:    Lab Results   Component Value Date/Time    LABA1C 5.7 06/05/2024 06:23 PM       Assessment   Gianni Risk Score: Gianni Scale Score: 18    Patient Active Problem List   Diagnosis Code    Urinary tract infection without hematuria N39.0    Morbid obesity with BMI of 40.0-44.9, adult ([facility]) E66.01, Z68.41    Anticoagulated Z79.01    LISA (obstructive sleep apnea) G47.33    Uncontrolled type 2 diabetes mellitus with hyperglycemia ([facility]) E11.65    Chronic diastolic heart failure ([facility]) I50.32    PAF (paroxysmal atrial fibrillation) ([facility]) I48.0    Tobacco abuse Z72.0    Cervical radiculopathy M54.12    COPD (chronic obstructive pulmonary disease) ([facility]) J44.9    Coronary atherosclerosis I25.10    Lymphedema of both lower extremities I89.0    Glaucoma H40.9    Hypothyroidism due to acquired atrophy of thyroid E03.4    Mixed hyperlipidemia E78.2    Primary hypertension I10    Chronic congestive heart failure ([facility]) I50.9    Cellulitis of left lower extremity L03.116    Abnormal CT of the chest R93.89    Nocturnal hypoxia G47.34    Elevated d-dimer R79.89    Cellulitis L03.90       Measurements:     Pt seen with Dr. Lan. LLE edematous, has dry, scaling skin. Leg edematous. No maggots noted in wound at this time, area does not feel fluctuant. Plan for vascular consult. RLE dry and scaling.       Response to treatment:  Well tolerated by patient.     Plan   Plan of Care:    -aquaphor to dry

## 2025-06-27 NOTE — PROGRESS NOTES
Medication Reconciliation    List of medications patient is currently taking is complete.     Source of information: 1. Conversation with patient at bedside                                      2. EPIC records      Notes regarding home medications:   1. Removed dicylomine, Trulicity, melatonin, and ondansetron  2. Pt reported he is only taking furosemide 40 mg daily at this time  3. Updated methocarbamol and gabapentin  4. Pt reported he was recently prescribed sertraline (filled by pharmacy 6/1/25) but that he has not gotten it yet to start it. He reported he has to have his prescriptions delivered from the pharmacy because he cannot go pick them up     So Baldwin [facility], PharmD 6/27/2025 11:14 AM

## 2025-06-27 NOTE — ED PROVIDER NOTES
needed (Rash) To groin for yeast 1 each 5    atorvastatin (LIPITOR) 40 MG tablet Take 1 tablet by mouth daily At bedtime      latanoprost (XALATAN) 0.005 % ophthalmic solution Place 1 drop into both eyes nightly      ondansetron (ZOFRAN) 4 MG tablet Take 1 tablet by mouth every 8 hours as needed for Nausea or Vomiting         PHYSICAL EXAM:  ED Triage Vitals   BP Systolic BP Percentile Diastolic BP Percentile Temp Temp Source Pulse Respirations SpO2   06/27/25 0602 -- -- 06/27/25 0602 06/27/25 0602 06/27/25 0602 06/27/25 0602 06/27/25 0602   (!) 146/132   98.5 °F (36.9 °C) Oral 99 16 92 %      Height Weight - Scale         06/27/25 0743 06/27/25 0602         1.829 m (6') (!) 163.3 kg (360 lb)              Physical Exam   Patient is morbidly obese.  Heart regular rate and rhythm.  Lungs with few scattered rales but otherwise clear.  Patient has intertriginous erythema with excoriation.  Also has bilateral venous stasis dermatitis and the left leg has some areas that have been denuded and is weeping with a small wound about 1 cm in diameter.      DIAGNOSTIC RESULTS   LABS:   Labs Reviewed   COMPREHENSIVE METABOLIC PANEL W/ REFLEX TO MG FOR LOW K - Abnormal; Notable for the following components:       Result Value    Glucose 109 (*)     Total Bilirubin 1.1 (*)     All other components within normal limits   CULTURE, BLOOD 1   CULTURE, BLOOD 2   CBC WITH AUTO DIFFERENTIAL   TROPONIN   LACTATE, SEPSIS   BRAIN NATRIURETIC PEPTIDE      When ordered only abnormal lab results are displayed. All other labs were within normal range or not returned as of this dictation.     RADIOLOGY:    Interpretation per the Radiologist below, if available at the time of this note:    XR CHEST PORTABLE   Final Result   Increasing reticular interstitial lung markings with more focal airspace   opacity in the right perihilar region. Findings may represent pulmonary edema   versus atypical pneumonia.                    EMERGENCY DEPARTMENT   Currently he also appears to have a cellulitis as well as a pneumonia and an intertriginous candidiasis all of which would benefit from treatment here in the hospital.  We discussed patient's CODE STATUS and he confirmed full CODE STATUS.    Is this patient to be included in the SEP-1 Core Measure due to severe sepsis or septic shock?   No   Exclusion criteria - the patient is NOT to be included for SEP-1 Core Measure due to:  May have criteria for sepsis, but does not meet criteria for severe sepsis or septic shock    CONSULTS: Hospitalist for admission, see discussion above.    Chronic Conditions: Obesity  Disposition Considerations: Disposition to home considered but given patient's living situation is suboptimal for his care this was decided against.  Also patient has at least 1 SIRS criteria and I do believe would benefit from further wound care and I believe admission is the most appropriate disposition.  Critical Care: I personally saw the patient and independently provided 35 minutes of non-concurrent critical care out of the total shared critical care time excluding separately billable procedures.  Code Discussion: Goals of care discussed, patient, or designee, confirmed Full code status.    I am the primary physician of Record.     FINAL IMPRESSION    1. Cellulitis of left lower extremity    2. Open wound    3. Pneumonia due to infectious organism, unspecified laterality, unspecified part of lung    4. Candidiasis, intertriginous         DISPOSITION/PLAN   DISPOSITION Admitted 06/27/2025 08:07:39 AM                PATIENT REFERRED TO:   No follow-up provider specified.   DISCHARGE MEDICATIONS:   New Prescriptions    No medications on file      DISCONTINUED MEDICATIONS:   Discontinued Medications    No medications on file            (Please note that portions of this note were completed with a voice recognition program.  Efforts were made to edit the dictations but occasionally words are mis-transcribed.)

## 2025-06-27 NOTE — PROGRESS NOTES
Discussed risks, benefits, and alternatives with patient.    Patient reason for declining alarm treatment: \"dont need it\"    The following provider was notified of patient's intent to refuse: reji    Feedback from provider: made aware    Alternative interventions used in the meantime: bed in low position, education

## 2025-06-27 NOTE — PROGRESS NOTES
[facility], [place] - Physical Therapy   Phone: [phone]    Physical Therapy  Facility/Department:[address] ORTHOPEDICS    [x] Initial Evaluation            [] Daily Treatment Note         [] Discharge Summary      Patient: Calixto Steen   : 1955   MRN: [id number]   Date of Service:  2025  Staff Mobility Recommendation: rollator walker  close sba     AM-PAC score:   Discharge Recommendations: anticipate Home   -with Home PT OT for short duration     Admitting Diagnosis: Cellulitis  Ordering Physician: Riky   Current Admission Summary: here due to worsening cellulitus bilateral LEs     Past Medical History:  has a past medical history of Cellulitis, Cerebrovascular accident (HCC), Chest pain, Chest pain, rule out acute myocardial infarction, Chronic deep vein thrombosis (DVT) of femoral vein of left lower extremity ([facility]), Chronic diastolic CHF (congestive heart failure) ([facility]), DM (diabetes mellitus), type 2 (HCC), HTN (hypertension), Neutrophilia, Pain of right lower extremity, and Sleep apnea.  Past Surgical History:  has no past surgical history on file.    Assessment  Activity Tolerance: Fair  Impairments Requiring Therapeutic Intervention: decreased functional mobility, decreased ADL status, decreased ROM, decreased strength, decreased balance  Prognosis: good and fair    Clinical Assessment:   -here due to worsening cellulitis left LE - open wounds - states had maggots underneath ACE wrap he had placed several days earlier   -he managed up from bed to standing with close sba - he is able to get hold of his rollator to travel bed to recliner   -had CVA about 5 years ago and with weakness and diminished sensation left UE and LEs   -at baseline he lives in basement area of a home - not able to fit both himself and a shower chair in the bathroom shower   -has an electric scooter but needs new batteries and tires     -anticipate discharge to home with recommendation for PT OT and nursing care  in bed, and nurse notified    Plan  Frequency: 3-5 x/week  Current Treatment Recommendations: functional mobility training, patient/caregiver education, ADL/self-care training, and positioning    Goals  Patient Goals: get wounds on left leg healed    Short Term Goals:  Time Frame: 1-2 days  Patient will complete bed mobility with supervision   Patient will complete transfers with set up assistance   Patient will ambulate 30-50 ft with use of rollator (4WRW) with modified independent     Above goals reviewed on 6/27/2025.  All goals are ongoing at this time unless indicated above.      Therapy Session Time      Individual Group Co-treatment   Time In     1150   Time Out     1220   Minutes     30       Timed Code Treatment Minutes:   20  Total Treatment Minutes:  30    Minutes per charge:  Moderate complexity eval: 10 minutes  Therapeutic activity: 20 minutes      Electronically signed by VANI BARRIENTOS, PT on 6/27/2025 at 1:29 PM

## 2025-06-27 NOTE — PROGRESS NOTES
Pt came to floor. Admission questions done vitals took and head to toe performed. Pt is up in the chair and pleasant. Call light in reach

## 2025-06-27 NOTE — CARE COORDINATION
Discharge Planning:  Received a call from [facility] and they advised that they are unable to accept patient due to him being on the sex offender registry.    Discussed with patient , he acknowledge that he is on the Registry.  Discussed that the only SNF to accept Sex offenders is [facility],patient reported that he has been there before, took him a year to get out.  Advised that [facility] [place] the [place]  Other SNF's in [place], [place], [facility], [place], [place], [place].  List given to patient.    Referral sent to [facility].

## 2025-06-27 NOTE — DISCHARGE INSTR - COC
Continuity of Care Form    Patient Name: Calixto Steen   :  1955  MRN:  [id number]    Admit date:  2025  Discharge date:  2025    Code Status Order: Full Code   Advance Directives:     Admitting Physician:  Racheal Lan MD  PCP: Mayi Camarena PA    Discharging Nurse: Delores ELMORE  Discharging Hospital Unit/Room#: Y8O-7914/3111-01  Discharging Unit Phone Number: [phone]    Emergency Contact:   Extended Emergency Contact Information  Primary Emergency Contact: cristhian kellogg  Address: [address] [place] [address] [address] [place] [facility] [place] [address] [facility]  Home Phone: [phone]  Mobile Phone: [phone]  Relation: Friend    Past Surgical History:  History reviewed. No pertinent surgical history.    Immunization History:   Immunization History   Administered Date(s) Administered    COVID-19, PFIZER PURPLE top, DILUTE for use, (age 12 y+), 30mcg/0.3mL 2021, 2021    Influenza Virus Vaccine 10/28/2014, 2015, 2016, 10/18/2017, 10/24/2018, 10/03/2019, 2020    Influenza, AFLURIA (age 3 y+), FLUZONE, (age 6 mo+), Quadv MDV, 0.5mL 10/03/2019, 2020    Influenza, FLUAD, (age 65 y+), IM, Quadv, 0.5mL 2021    Pneumococcal, PCV-13, PREVNAR 13, (age 6w+), IM, 0.5mL 2020    Pneumococcal, PPSV23, PNEUMOVAX 23, (age 2y+), SC/IM, 0.5mL 10/28/2014, 2021    TDaP, ADACEL (age 10y-64y), BOOSTRIX (age 10y+), IM, 0.5mL 2016    Zoster Live (Zostavax) 2016       Active Problems:  Patient Active Problem List   Diagnosis Code    Urinary tract infection without hematuria N39.0    Morbid obesity with BMI of 40.0-44.9, adult (HCC) E66.01, Z68.41    Anticoagulated Z79.01    LISA (obstructive sleep apnea) G47.33    Uncontrolled type 2 diabetes mellitus with hyperglycemia (HCC) E11.65    Chronic diastolic heart failure (HCC) I50.32    PAF (paroxysmal atrial fibrillation) ([facility]) I48.0    Tobacco abuse Z72.0    Cervical radiculopathy M54.12    COPD  (chronic obstructive pulmonary disease) (HCC) J44.9    Coronary atherosclerosis I25.10    Lymphedema of both lower extremities I89.0    Glaucoma H40.9    Hypothyroidism due to acquired atrophy of thyroid E03.4    Mixed hyperlipidemia E78.2    Primary hypertension I10    Chronic congestive heart failure (HCC) I50.9    Cellulitis of left lower extremity L03.116    Abnormal CT of the chest R93.89    Nocturnal hypoxia G47.34    Elevated d-dimer R79.89    Cellulitis L03.90       Isolation/Infection:   Isolation            No Isolation          Patient Infection Status    No active infections.   Resolved       Infection Onset Added Last Indicated Last Indicated By Resolved Resolved By    MRSA 03/13/20 12/30/22  Mag Ceron RN 06/27/25 Kathrine Welch RN    Added from external infection.                         Nurse Assessment:  Last Vital Signs: /69   Pulse 83   Temp 98.6 °F (37 °C)   Resp 16   Ht 1.829 m (6')   Wt (!) 163.3 kg (360 lb)   SpO2 91%   BMI 48.82 kg/m²     Last documented pain score (0-10 scale): Pain Level: 2  Last Weight:   Wt Readings from Last 1 Encounters:   06/27/25 (!) 163.3 kg (360 lb)     Mental Status:  oriented and alert    IV Access:  - None    Nursing Mobility/ADLs:  Walking   Independent  Transfer  Independent  Bathing  Independent  Dressing  Independent  Toileting  Independent  Feeding  Independent  Med Admin  Independent  Med Delivery   whole    Wound Care Documentation and Therapy:        Elimination:  Continence:   Bowel: Yes  Bladder: Yes  Urinary Catheter: None   Colostomy/Ileostomy/Ileal Conduit: No       Date of Last BM: 06-    Intake/Output Summary (Last 24 hours) at 6/27/2025 1058  Last data filed at 6/27/2025 0741  Gross per 24 hour   Intake 50 ml   Output --   Net 50 ml     No intake/output data recorded.    Safety Concerns:     At Risk for Falls    Impairments/Disabilities:      Vision    Nutrition Therapy:  Current Nutrition Therapy:   - Oral Diet:  General

## 2025-06-27 NOTE — CARE COORDINATION
Case Management Assessment  Initial Evaluation    Date/Time of Evaluation: 6/27/2025 12:12 PM  Assessment Completed by: Veronique Velasco    If patient is discharged prior to next notation, then this note serves as note for discharge by case management.    Patient Name: Calixto Steen                   YOB: 1955  Diagnosis: Open wound [T14.8XXA]  Cellulitis [L03.90]  Candidiasis, intertriginous [B37.2]  Cellulitis of left lower extremity [L03.116]  Pneumonia due to infectious organism, unspecified laterality, unspecified part of lung [J18.9]                   Date / Time: 6/27/2025  5:51 AM    Patient Admission Status: Inpatient   Readmission Risk (Low < 19, Mod (19-27), High > 27): Readmission Risk Score: 9.4    Current PCP: Greer Hampton PA-C  PCP verified by CM? Yes    Chart Reviewed: Yes      History Provided by: Patient  Patient Orientation: Alert and Oriented    Patient Cognition: Alert    Hospitalization in the last 30 days (Readmission):  No    If yes, Readmission Assessment in CM Navigator will be completed.    Advance Directives:      Code Status: Full Code   Patient's Primary Decision Maker is: Legal Next of Kin    Primary Decision Maker: cristhian kellogg [facility] - [phone]    Discharge Planning:    Patient lives with: Alone Type of Home: Apartment  Primary Care Giver: Self  Patient Support Systems include: Friends/Neighbors   Current Financial resources: Medicare  Current community resources: Other (Comment) (COA)  Current services prior to admission: Other (Comment) (COA)            Current DME:              Type of Home Care services:  None    ADLS  Prior functional level: Independent in ADLs/IADLs  Current functional level: Independent in ADLs/IADLs    PT AM-PAC:   /24  OT AM-PAC:   /24    Family can provide assistance at DC: No  Would you like Case Management to discuss the discharge plan with any other family members/significant others, and if so, who? No  Plans to Return

## 2025-06-27 NOTE — PROGRESS NOTES
4 Eyes Skin Assessment     NAME:  Calixto Steen  YOB: 1955  MEDICAL RECORD NUMBER:  [id number]    The patient is being assessed for  Admission    I agree that at least one RN has performed a thorough Head to Toe Skin Assessment on the patient. ALL assessment sites listed below have been assessed.      Areas assessed by both nurses:    Head, Face, Ears, Shoulders, Back, Chest, Arms, Elbows, Hands, Sacrum. Buttock, Coccyx, Ischium, and Legs. Feet and Heels        Does the Patient have a Wound? Yes wound(s) were present on assessment. LDA wound assessment was Initiated and completed by RN       Gianni Prevention initiated by RN: Yes  Wound Care Orders initiated by RN: Yes    Pressure Injury (Stage 1,2,3,4, Unstageable, DTI, NWPT, and Complex wounds) if present, place Wound referral order by RN under : Yes    New Ostomies, if present place, Ostomy referral order under : No     Nurse 1 eSignature: Electronically signed by Edin Munoz RN on 6/27/25 at 3:45 PM EDT    **SHARE this note so that the co-signing nurse can place an eSignature**    Nurse 2 eSignature: Electronically signed by Cassi Potter RN on 6/27/25 at 3:48 PM EDT

## 2025-06-27 NOTE — CONSULTS
Department of Podiatry Consult Note  Shashi Castanon DPM Attending       Reason for Consult:  Wound of LEFT lower extremity due to lymphedema, and painful elongation of pedal nails  Requesting Physician:  Racheal Lan MD    CHIEF COMPLAINT:  Weeping wound with maggots, and long toenails    HISTORY OF PRESENT ILLNESS:                The patient is a 69 y.o. male with significant past medical history of Cerebrovascular Accident, Diabetes, CHF with Lymphedema who is consulted for wound of LEFT lateral leg secondary to Lymphedema that has maggot infestation, as well as chronically mycotic nails that limit ambulation    Past Medical History:        Diagnosis Date    Cellulitis 09/21/2023    Cerebrovascular accident (HCC) 08/05/2022    Chest pain 01/01/2023    Chest pain, rule out acute myocardial infarction 12/30/2022    Chronic deep vein thrombosis (DVT) of femoral vein of left lower extremity (HCC) 08/21/2020    Chronic diastolic CHF (congestive heart failure) ([facility])     DM (diabetes mellitus), type 2 ([facility])     HTN (hypertension)     Neutrophilia 09/22/2023    Pain of right lower extremity 09/22/2023    Sleep apnea      Past Surgical History:    History reviewed. No pertinent surgical history.  Current Medications:    Current Facility-Administered Medications: miconazole (MICOTIN) 2 % powder, , Topical, Once  atorvastatin (LIPITOR) tablet 40 mg, 40 mg, Oral, QHS  budesonide-formoterol (SYMBICORT) 80-4.5 MCG/ACT inhaler 2 puff, 2 puff, Inhalation, BID RT **AND** tiotropium (SPIRIVA RESPIMAT) 2.5 MCG/ACT inhaler 5 mcg, 2 puff, Inhalation, Daily RT  hydrOXYzine HCl (ATARAX) tablet 25 mg, 25 mg, Oral, 4x Daily PRN  latanoprost (XALATAN) 0.005 % ophthalmic solution 1 drop, 1 drop, Both Eyes, Nightly  levothyroxine (SYNTHROID) tablet 50 mcg, 50 mcg, Oral, Daily  lisinopril (PRINIVIL;ZESTRIL) tablet 10 mg, 10 mg, Oral, Daily  nicotine (NICODERM CQ) 21 MG/24HR 1 patch, 1 patch, TransDERmal, Daily  sodium chloride flush 0.9 %  LEFT lower extremity secondary to open wound of Venous insufficiency / Lymphedema  Afebrile, absence of Leukocytosis  Cefepime, Diflucan    2. Maggot infestation  Dakin's solution soaked gauze to open wound of LEFT lateral calf    3. Venous insufficiency / Lymphedema  Both lower extremities wrapped in multilayer compression from digits to knees  Evaluated by Dr. Guardado, appreciate recommendations    4. Ulcer of LEFT lower extremity, grossly contaminated  Convert to clean with Vash or Dakin's    5. Onychomycosis of pedal nails  Ten pedal nails debrided in both thickness and length    6. Xerosis, with Zinc Oxide applied liberally to both lower extremities for absorption of serous drainage and promotion of granular tissues    Will continue to follow for wound care, no surgical needs anticipated    Thank you for the opportunity to take part in the patient's care.    Shashi Castanon [facility]  [facility]  [phone]

## 2025-06-27 NOTE — ACP (ADVANCE CARE PLANNING)
Advance Care Planning   Healthcare Decision Maker:    Primary Decision Maker: cristhian kellogg - [place] - [phone]      Today we documented Decision Maker(s) consistent with Legal Next of Kin hierarchy.       Electronically signed by Veronique Velasco on 6/27/2025 at 12:20 PM

## 2025-06-27 NOTE — CONSULTS
Infectious Diseases Inpatient Consult Note      Reason for Consult: Left leg nonhealing wound with maggot infestation and cellulitis with drainage     Requesting Physician:   Dr. Lan    Primary Care Physician:  Mayi Camarena PA    History Obtained From:  [facility] and patient    CHIEF COMPLAINT:     Chief Complaint   Patient presents with    Chest Pain    Leg Pain     Pt present in the ED via ems from home c/o cp. Pt states that he woke and saw a bunch of maggot on his leg. Pt also a cp that started when he was walking around. Pt AXO 4, VSS.         HISTORY OF PRESENT ILLNESS:  69 y.o. male with a significant history for morbid obesity BMI at 48, hypertension, CVA, diabetes, poor mobility sleep apnea admitted to hospital secondary to left leg swelling redness nonhealing wound patient noted maggots coming off when he took the Ace wrap's down he was also complaining of some shortness of breath and chest pain and to present to the hospital. He had long standing venous ulcer for nearly 10 YRS per patient and wounds never healed and he has on going bi lateral lymph edema. labs indicate creatinine 1 sodium 136 LFT normal WBC normal hemoglobin 16.9 blood cultures in process.  We are consulted for recommendations      Past Medical History:    Past Medical History:   Diagnosis Date    Cellulitis 09/21/2023    Cerebrovascular accident (HCC) 08/05/2022    Chest pain 01/01/2023    Chest pain, rule out acute myocardial infarction 12/30/2022    Chronic deep vein thrombosis (DVT) of femoral vein of left lower extremity (HCC) 08/21/2020    Chronic diastolic CHF (congestive heart failure) (HCC)     DM (diabetes mellitus), type 2 (HCC)     HTN (hypertension)     Neutrophilia 09/22/2023    Pain of right lower extremity 09/22/2023    Sleep apnea        Past Surgical History:    History reviewed. No pertinent surgical history.    Current Medications:    No outpatient medications have been marked as taking for the 6/27/25

## 2025-06-27 NOTE — PROGRESS NOTES
Clinical Pharmacy Note  Renal Dose Adjustment    Calixto Steen is receiving cefepime.  This medication is renally eliminated.  Based on the patient's Estimated Creatinine Clearance: 109 mL/min (based on SCr of 1 mg/dL). Dx SSTI, and BMI 48.82, the dose has been adjusted to 2000 mg Q8H per protocol.    Pharmacy will continue to monitor and adjust dose as needed for changes in renal function.      So Baldwin RPH, PharmD 6/27/2025 10:32 AM

## 2025-06-27 NOTE — PROGRESS NOTES
[facility] - Occupational Therapy   Phone: [phone]    Occupational Therapy  Facility/Department:[address] ORTHOPEDICS    [x] Initial Evaluation            [] Daily Treatment Note         [] Discharge Summary      Patient: Calixto Steen   : 1955   MRN: [id number]   Date of Service:  2025    Staff Mobility Recommendation: rollator x1    AM-PAC Score: 21  Discharge Recommendations: return home, home OT    Calixto Steen scored a 21 on the AM-PAC ADL Inpatient form. Current research shows that an AM-PAC score of 18 or greater is typically associated with a discharge to the patient's home setting. Based on the patient's AM-PAC score, and their current ADL deficits, it is recommended that the patient have 2-3 sessions per week of Occupational Therapy at d/c to increase the patient's independence.  At this time, this patient demonstrates the endurance and safety to discharge home with assist PRN and a follow up treatment frequency of 2-3x/wk.   Please see assessment section for further patient specific details.    If patient discharges prior to next session this note will serve as a discharge summary.  Please see below for the latest assessment towards goals.       Admitting Diagnosis:  Cellulitis  Ordering Physician:     Racheal Lan MD     Current Admission Summary: Pt is a 70 yo M admitted with cellulitis after presenting to ED with c/o chest pain and leg pain - reports he placed an ace wrap on LLE d/t weeping and upon taking the wrap off, he found maggots. Hx includes CVA in  with residual L sided weakness.    Past Medical History:  has a past medical history of Cellulitis, Cerebrovascular accident (HCC), Chest pain, Chest pain, rule out acute myocardial infarction, Chronic deep vein thrombosis (DVT) of femoral vein of left lower extremity (HCC), Chronic diastolic CHF (congestive heart failure) (HCC), DM (diabetes mellitus), type 2 (HCC), HTN (hypertension), Neutrophilia, Pain of right

## 2025-06-27 NOTE — CONSULTS
[facility]  Consultation Note    6/27/2025 11:59 AM    Chief Complaint: LLE Wound     Reason for Consult: LLE Wound    History of Present Illness:  Patient is a 69 y.o. male who presented to the ED on 6/27/2025 with complaints of LLE Wound and Worsening pain. He has a significant PMH of Cellulitis, CVA (2022), BLE DVT's, CHF, DM II, HTN and IVC Filter Placement. The patient tells me he has has wounds to his legs since 2014. He tell me the pain and wound to his Left Leg has acutely worsened over the past 3 weeks. His wound apparently had maggots present upon arrival in the ED. The patient reports Left Sided weakness with reduced sensation which he attributes to his prior CVA. We are consulted to evaluate the patient for his LLE Wound. At present, the patient is seen resting in bed, he has significant difficulty with laying in bed at 30 degrees and cites difficulty breathing, he is alert and oriented and appears to be in stable condition.    Review of Systems  Review of Systems   Constitutional: Negative.    HENT: Negative.     Respiratory:  Positive for shortness of breath (Difficulty laying down).    Cardiovascular:  Positive for leg swelling (Chronic).   Gastrointestinal: Negative.    Genitourinary: Negative.    Musculoskeletal: Negative.    Skin:  Positive for wound (Left Leg).   Neurological:         LUE and LLE weakness and reduced sensation 2/2 prior CVA   Hematological: Negative.    Psychiatric/Behavioral: Negative.          Past Medical History:   Diagnosis Date    Cellulitis 09/21/2023    Cerebrovascular accident (HCC) 08/05/2022    Chest pain 01/01/2023    Chest pain, rule out acute myocardial infarction 12/30/2022    Chronic deep vein thrombosis (DVT) of femoral vein of left lower extremity (HCC) 08/21/2020    Chronic diastolic CHF (congestive heart failure) (HCC)     DM (diabetes mellitus), type 2 (HCC)     HTN (hypertension)     Neutrophilia 09/22/2023    Pain of right lower

## 2025-06-28 LAB
ANION GAP SERPL CALCULATED.3IONS-SCNC: 14 MMOL/L (ref 3–16)
BASOPHILS # BLD: 0 K/UL (ref 0–0.2)
BASOPHILS NFR BLD: 0.4 %
BUN SERPL-MCNC: 13 MG/DL (ref 7–20)
CALCIUM SERPL-MCNC: 8.4 MG/DL (ref 8.3–10.6)
CHLORIDE SERPL-SCNC: 100 MMOL/L (ref 99–110)
CO2 SERPL-SCNC: 20 MMOL/L (ref 21–32)
CREAT SERPL-MCNC: 1.1 MG/DL (ref 0.8–1.3)
DEPRECATED RDW RBC AUTO: 14.7 % (ref 12.4–15.4)
EOSINOPHIL # BLD: 0.2 K/UL (ref 0–0.6)
EOSINOPHIL NFR BLD: 2 %
EST. AVERAGE GLUCOSE BLD GHB EST-MCNC: 128.4 MG/DL
GFR SERPLBLD CREATININE-BSD FMLA CKD-EPI: 72 ML/MIN/{1.73_M2}
GLUCOSE SERPL-MCNC: 128 MG/DL (ref 70–99)
HBA1C MFR BLD: 6.1 %
HCT VFR BLD AUTO: 50.5 % (ref 40.5–52.5)
HGB BLD-MCNC: 16.9 G/DL (ref 13.5–17.5)
LYMPHOCYTES # BLD: 0.9 K/UL (ref 1–5.1)
LYMPHOCYTES NFR BLD: 9.8 %
MCH RBC QN AUTO: 29.3 PG (ref 26–34)
MCHC RBC AUTO-ENTMCNC: 33.5 G/DL (ref 31–36)
MCV RBC AUTO: 87.4 FL (ref 80–100)
MONOCYTES # BLD: 1.1 K/UL (ref 0–1.3)
MONOCYTES NFR BLD: 11.4 %
MRSA DNA SPEC QL NAA+PROBE: NORMAL
NEUTROPHILS # BLD: 7.4 K/UL (ref 1.7–7.7)
NEUTROPHILS NFR BLD: 76.4 %
PLATELET # BLD AUTO: 187 K/UL (ref 135–450)
PMV BLD AUTO: 8.3 FL (ref 5–10.5)
POTASSIUM SERPL-SCNC: 3.8 MMOL/L (ref 3.5–5.1)
RBC # BLD AUTO: 5.78 M/UL (ref 4.2–5.9)
SODIUM SERPL-SCNC: 134 MMOL/L (ref 136–145)
WBC # BLD AUTO: 9.6 K/UL (ref 4–11)

## 2025-06-28 PROCEDURE — 6370000000 HC RX 637 (ALT 250 FOR IP): Performed by: FAMILY MEDICINE

## 2025-06-28 PROCEDURE — 94640 AIRWAY INHALATION TREATMENT: CPT

## 2025-06-28 PROCEDURE — 85025 COMPLETE CBC W/AUTO DIFF WBC: CPT

## 2025-06-28 PROCEDURE — 2500000003 HC RX 250 WO HCPCS: Performed by: FAMILY MEDICINE

## 2025-06-28 PROCEDURE — 94761 N-INVAS EAR/PLS OXIMETRY MLT: CPT

## 2025-06-28 PROCEDURE — 2580000003 HC RX 258: Performed by: FAMILY MEDICINE

## 2025-06-28 PROCEDURE — 6360000002 HC RX W HCPCS: Performed by: FAMILY MEDICINE

## 2025-06-28 PROCEDURE — 83036 HEMOGLOBIN GLYCOSYLATED A1C: CPT

## 2025-06-28 PROCEDURE — 1200000000 HC SEMI PRIVATE

## 2025-06-28 PROCEDURE — 80048 BASIC METABOLIC PNL TOTAL CA: CPT

## 2025-06-28 PROCEDURE — 2700000000 HC OXYGEN THERAPY PER DAY

## 2025-06-28 PROCEDURE — 36415 COLL VENOUS BLD VENIPUNCTURE: CPT

## 2025-06-28 RX ADMIN — ACETAMINOPHEN 650 MG: 325 TABLET ORAL at 21:24

## 2025-06-28 RX ADMIN — Medication 1 CAPSULE: at 09:57

## 2025-06-28 RX ADMIN — LEVOTHYROXINE SODIUM 50 MCG: 0.05 TABLET ORAL at 05:37

## 2025-06-28 RX ADMIN — TIOTROPIUM BROMIDE INHALATION SPRAY 5 MCG: 3.12 SPRAY, METERED RESPIRATORY (INHALATION) at 08:33

## 2025-06-28 RX ADMIN — ENOXAPARIN SODIUM 40 MG: 100 INJECTION SUBCUTANEOUS at 21:24

## 2025-06-28 RX ADMIN — CEFEPIME 2000 MG: 2 INJECTION, POWDER, FOR SOLUTION INTRAVENOUS at 04:18

## 2025-06-28 RX ADMIN — METRONIDAZOLE 500 MG: 500 INJECTION, SOLUTION INTRAVENOUS at 02:36

## 2025-06-28 RX ADMIN — ATORVASTATIN CALCIUM 40 MG: 40 TABLET, FILM COATED ORAL at 21:24

## 2025-06-28 RX ADMIN — BUDESONIDE AND FORMOTEROL FUMARATE DIHYDRATE 2 PUFF: 80; 4.5 AEROSOL RESPIRATORY (INHALATION) at 20:39

## 2025-06-28 RX ADMIN — LISINOPRIL 10 MG: 10 TABLET ORAL at 09:57

## 2025-06-28 RX ADMIN — HYDROMORPHONE HYDROCHLORIDE 1 MG: 1 INJECTION, SOLUTION INTRAMUSCULAR; INTRAVENOUS; SUBCUTANEOUS at 21:24

## 2025-06-28 RX ADMIN — LATANOPROST 1 DROP: 50 SOLUTION OPHTHALMIC at 21:35

## 2025-06-28 RX ADMIN — METRONIDAZOLE 500 MG: 500 INJECTION, SOLUTION INTRAVENOUS at 11:44

## 2025-06-28 RX ADMIN — CEFEPIME 2000 MG: 2 INJECTION, POWDER, FOR SOLUTION INTRAVENOUS at 13:29

## 2025-06-28 RX ADMIN — GABAPENTIN 600 MG: 300 CAPSULE ORAL at 09:57

## 2025-06-28 RX ADMIN — BUDESONIDE AND FORMOTEROL FUMARATE DIHYDRATE 2 PUFF: 80; 4.5 AEROSOL RESPIRATORY (INHALATION) at 08:33

## 2025-06-28 RX ADMIN — FUROSEMIDE 40 MG: 10 INJECTION, SOLUTION INTRAMUSCULAR; INTRAVENOUS at 09:58

## 2025-06-28 RX ADMIN — GABAPENTIN 600 MG: 300 CAPSULE ORAL at 21:24

## 2025-06-28 RX ADMIN — SODIUM CHLORIDE, PRESERVATIVE FREE 10 ML: 5 INJECTION INTRAVENOUS at 21:25

## 2025-06-28 RX ADMIN — FLUCONAZOLE 200 MG: 100 TABLET ORAL at 09:57

## 2025-06-28 RX ADMIN — ENOXAPARIN SODIUM 40 MG: 100 INJECTION SUBCUTANEOUS at 09:57

## 2025-06-28 RX ADMIN — WHITE PETROLATUM: 1.75 OINTMENT TOPICAL at 10:40

## 2025-06-28 RX ADMIN — FUROSEMIDE 40 MG: 10 INJECTION, SOLUTION INTRAMUSCULAR; INTRAVENOUS at 21:24

## 2025-06-28 ASSESSMENT — PAIN DESCRIPTION - PAIN TYPE
TYPE: CHRONIC PAIN
TYPE: CHRONIC PAIN

## 2025-06-28 ASSESSMENT — PAIN SCALES - GENERAL
PAINLEVEL_OUTOF10: 8
PAINLEVEL_OUTOF10: 6

## 2025-06-28 ASSESSMENT — PAIN DESCRIPTION - FREQUENCY
FREQUENCY: CONTINUOUS
FREQUENCY: CONTINUOUS

## 2025-06-28 ASSESSMENT — PAIN DESCRIPTION - ONSET
ONSET: ON-GOING
ONSET: ON-GOING

## 2025-06-28 ASSESSMENT — PAIN DESCRIPTION - DESCRIPTORS
DESCRIPTORS: THROBBING
DESCRIPTORS: THROBBING

## 2025-06-28 ASSESSMENT — PAIN DESCRIPTION - LOCATION
LOCATION: LEG
LOCATION: LEG

## 2025-06-28 ASSESSMENT — PAIN DESCRIPTION - ORIENTATION
ORIENTATION: LEFT;RIGHT
ORIENTATION: RIGHT;LEFT

## 2025-06-28 ASSESSMENT — PAIN - FUNCTIONAL ASSESSMENT
PAIN_FUNCTIONAL_ASSESSMENT: ACTIVITIES ARE NOT PREVENTED
PAIN_FUNCTIONAL_ASSESSMENT: ACTIVITIES ARE NOT PREVENTED

## 2025-06-28 NOTE — PLAN OF CARE
Problem: Pain  Goal: Verbalizes/displays adequate comfort level or baseline comfort level  Outcome: Progressing     Problem: Skin/Tissue Integrity  Goal: Skin integrity remains intact  Description: 1.  Monitor for areas of redness and/or skin breakdown  2.  Assess vascular access sites hourly  3.  Every 4-6 hours minimum:  Change oxygen saturation probe site  4.  Every 4-6 hours:  If on nasal continuous positive airway pressure, respiratory therapy assess nares and determine need for appliance change or resting period  Outcome: Progressing  Flowsheets (Taken 6/27/2025 2228)  Skin Integrity Remains Intact: Monitor for areas of redness and/or skin breakdown     Problem: Safety - Adult  Goal: Free from fall injury  Outcome: Progressing  Flowsheets (Taken 6/27/2025 2228)  Free From Fall Injury: Instruct family/caregiver on patient safety

## 2025-06-28 NOTE — PLAN OF CARE
Problem: Chronic Conditions and Co-morbidities  Goal: Patient's chronic conditions and co-morbidity symptoms are monitored and maintained or improved  Outcome: Progressing     Problem: Discharge Planning  Goal: Discharge to home or other facility with appropriate resources  Outcome: Progressing     Problem: Pain  Goal: Verbalizes/displays adequate comfort level or baseline comfort level  6/28/2025 0040 by Rachel Low RN  Outcome: Progressing  6/27/2025 2229 by Ratna Bunch RN  Outcome: Progressing     Problem: Skin/Tissue Integrity  Goal: Skin integrity remains intact  Description: 1.  Monitor for areas of redness and/or skin breakdown  2.  Assess vascular access sites hourly  3.  Every 4-6 hours minimum:  Change oxygen saturation probe site  4.  Every 4-6 hours:  If on nasal continuous positive airway pressure, respiratory therapy assess nares and determine need for appliance change or resting period  6/28/2025 0040 by Rachel Low RN  Outcome: Progressing  Flowsheets (Taken 6/28/2025 0039)  Skin Integrity Remains Intact: Monitor for areas of redness and/or skin breakdown  6/27/2025 2229 by Ratna Bunch RN  Outcome: Progressing  Flowsheets (Taken 6/27/2025 2228)  Skin Integrity Remains Intact: Monitor for areas of redness and/or skin breakdown     Problem: Safety - Adult  Goal: Free from fall injury  6/28/2025 0040 by Rachel Low RN  Outcome: Progressing  6/27/2025 2229 by Ratna Bunch RN  Outcome: Progressing  Flowsheets (Taken 6/27/2025 2228)  Free From Fall Injury: Instruct family/caregiver on patient safety     Problem: Neurosensory - Adult  Goal: Achieves stable or improved neurological status  Outcome: Progressing  Goal: Absence of seizures  Outcome: Progressing  Goal: Achieves maximal functionality and self care  Outcome: Progressing     Problem: Cardiovascular - Adult  Goal: Maintains optimal cardiac output and hemodynamic stability  Outcome: Progressing  Goal: Absence of cardiac  dysrhythmias or at baseline  Outcome: Progressing     Problem: Skin/Tissue Integrity - Adult  Goal: Skin integrity remains intact  Description: 1.  Monitor for areas of redness and/or skin breakdown  2.  Assess vascular access sites hourly  3.  Every 4-6 hours minimum:  Change oxygen saturation probe site  4.  Every 4-6 hours:  If on nasal continuous positive airway pressure, respiratory therapy assess nares and determine need for appliance change or resting period  6/28/2025 0040 by Rachel Low, RN  Outcome: Progressing  Flowsheets (Taken 6/28/2025 0039)  Skin Integrity Remains Intact: Monitor for areas of redness and/or skin breakdown  6/27/2025 2229 by Ratna Bunch, RN  Outcome: Progressing  Flowsheets (Taken 6/27/2025 2228)  Skin Integrity Remains Intact: Monitor for areas of redness and/or skin breakdown  Goal: Incisions, wounds, or drain sites healing without S/S of infection  Outcome: Progressing  Goal: Oral mucous membranes remain intact  Outcome: Progressing     Problem: Musculoskeletal - Adult  Goal: Return mobility to safest level of function  Outcome: Progressing  Goal: Maintain proper alignment of affected body part  Outcome: Progressing  Goal: Return ADL status to a safe level of function  Outcome: Progressing     Problem: Gastrointestinal - Adult  Goal: Minimal or absence of nausea and vomiting  Outcome: Progressing  Goal: Maintains or returns to baseline bowel function  Outcome: Progressing  Goal: Maintains adequate nutritional intake  Outcome: Progressing     Problem: Metabolic/Fluid and Electrolytes - Adult  Goal: Electrolytes maintained within normal limits  Outcome: Progressing  Goal: Hemodynamic stability and optimal renal function maintained  Outcome: Progressing  Goal: Glucose maintained within prescribed range  Outcome: Progressing

## 2025-06-28 NOTE — PROGRESS NOTES
V2.0    [facility] [place]      Name:  Calixto Steen /Age/Sex: 1955  (69 y.o. male)   MRN & CSN:  [id number] & [id number] Encounter Date/Time: 2025 4:56 PM EDT   Location:  [id number] PCP: Greer Hampton PA-C     Attending:Racheal Lan MD       Hospital Day: 2    Assessment and Recommendations   Calixto Steen is a 69 y.o. male who presents with Cellulitis      Plan:     Left leg cellulitis with maggots  Severe left leg lymphedema  - Cefepime, flagyl IV  - consult ID  - consult vascular surgery: No surgical intervention  - wound care  - podiatry: No surgical intervention, foot and toenail care provided  - hold Eliquis for now until confirmed that there is no surgery needed  - change oral lasix to 40mg IV BID for now to help with swelling  - probiotic     Intertriginous dermatitis  - topical and oral diflucan    Frequent loose stools daily  -He states has been going on for the past several weeks but then states he will have episodes of normal bowel movements  -Avoid Lomotil that may induce constipation  -Start probiotic  -Consider GI consult on Monday     COPD:  - change inhaler to home formulary symbicort and spiriva     Morbid obesity:  BMI 48  - increases morbidity and mortality risk  - weight loss counseling as appropriate     Hypothyroidism:  cont synthroid  Chronic pain:  cont gabapentin      Diet ADULT DIET; Regular   DVT Prophylaxis [] Lovenox, []  Heparin, [] SCDs, [] Ambulation,  [] Eliquis, [] Xarelto  [] Coumadin   Code Status Full Code             Personally reviewed Lab Studies and Imaging         Subjective:     Chief Complaint:     Leg pain improving    Review of Systems:      Pertinent positives and negatives discussed in HPI    Objective:     Intake/Output Summary (Last 24 hours) at 2025 1656  Last data filed at 2025 1150  Gross per 24 hour   Intake 2599.86 ml   Output 930 ml   Net 1669.86 ml      Vitals:   Vitals:    25 1919 25 0529 25 0716  Negative 06/05/2024 09:25 PM    BLOODU Negative 06/05/2024 09:25 PM    GLUCOSEU 250 06/05/2024 09:25 PM    KETUA Negative 06/05/2024 09:25 PM    AMORPHOUS Present 12/19/2023 02:44 PM     Urine Cultures:   Lab Results   Component Value Date/Time    LABURIN  06/05/2024 09:25 PM     <10,000 CFU/ml mixed skin/urogenital aliza. No further workup    LABURIN 75,000 CFU/ml 06/05/2024 09:25 PM     Blood Cultures:   Lab Results   Component Value Date/Time    BC  06/27/2025 06:56 AM     No Growth to date.  Any change in status will be called.     Lab Results   Component Value Date/Time    BLOODCULT2  06/27/2025 06:56 AM     No Growth to date.  Any change in status will be called.     Organism:   Lab Results   Component Value Date/Time    ORG Proteus mirabilis 06/05/2024 09:25 PM         Electronically signed by Racheal Lan MD on 6/28/2025 at 4:56 PM

## 2025-06-28 NOTE — PROGRESS NOTES
Department of Podiatry Progress Note  Shashi Castanon DPM Attending       CHIEF COMPLAINT:  Weeping wound with maggots, and long toenails    HISTORY OF PRESENT ILLNESS:                The patient is a 69 y.o. male with significant past medical history of Cerebrovascular Accident, Diabetes, CHF with Lymphedema who is consulted for wound of LEFT lateral leg secondary to Lymphedema that has maggot infestation, as well as chronically mycotic nails that limit ambulation    Dressings to LEFT leg have slid down due to decreased edema. Feeling much better today,, enthusiastic for his care, really wants to shower    Past Medical History:        Diagnosis Date    Cellulitis 09/21/2023    Cerebrovascular accident (HCC) 08/05/2022    Chest pain 01/01/2023    Chest pain, rule out acute myocardial infarction 12/30/2022    Chronic deep vein thrombosis (DVT) of femoral vein of left lower extremity (HCC) 08/21/2020    Chronic diastolic CHF (congestive heart failure) ([facility])     DM (diabetes mellitus), type 2 ([facility])     HTN (hypertension)     Neutrophilia 09/22/2023    Pain of right lower extremity 09/22/2023    Sleep apnea      Past Surgical History:    History reviewed. No pertinent surgical history.    Current Medications:    Current Facility-Administered Medications: miconazole (MICOTIN) 2 % powder, , Topical, Once  atorvastatin (LIPITOR) tablet 40 mg, 40 mg, Oral, QHS  budesonide-formoterol (SYMBICORT) 80-4.5 MCG/ACT inhaler 2 puff, 2 puff, Inhalation, BID RT **AND** tiotropium (SPIRIVA RESPIMAT) 2.5 MCG/ACT inhaler 5 mcg, 2 puff, Inhalation, Daily RT  hydrOXYzine HCl (ATARAX) tablet 25 mg, 25 mg, Oral, 4x Daily PRN  latanoprost (XALATAN) 0.005 % ophthalmic solution 1 drop, 1 drop, Both Eyes, Nightly  levothyroxine (SYNTHROID) tablet 50 mcg, 50 mcg, Oral, Daily  lisinopril (PRINIVIL;ZESTRIL) tablet 10 mg, 10 mg, Oral, Daily  nicotine (NICODERM CQ) 21 MG/24HR 1 patch, 1 patch, TransDERmal, Daily  sodium chloride flush 0.9 %

## 2025-06-29 LAB
ANION GAP SERPL CALCULATED.3IONS-SCNC: 12 MMOL/L (ref 3–16)
BASOPHILS # BLD: 0 K/UL (ref 0–0.2)
BASOPHILS NFR BLD: 0.6 %
BUN SERPL-MCNC: 16 MG/DL (ref 7–20)
CALCIUM SERPL-MCNC: 8.2 MG/DL (ref 8.3–10.6)
CHLORIDE SERPL-SCNC: 100 MMOL/L (ref 99–110)
CO2 SERPL-SCNC: 22 MMOL/L (ref 21–32)
CREAT SERPL-MCNC: 1.1 MG/DL (ref 0.8–1.3)
DEPRECATED RDW RBC AUTO: 14.6 % (ref 12.4–15.4)
EOSINOPHIL # BLD: 0.3 K/UL (ref 0–0.6)
EOSINOPHIL NFR BLD: 2.9 %
GFR SERPLBLD CREATININE-BSD FMLA CKD-EPI: 72 ML/MIN/{1.73_M2}
GLUCOSE SERPL-MCNC: 113 MG/DL (ref 70–99)
HCT VFR BLD AUTO: 46.6 % (ref 40.5–52.5)
HGB BLD-MCNC: 16.2 G/DL (ref 13.5–17.5)
LYMPHOCYTES # BLD: 1.4 K/UL (ref 1–5.1)
LYMPHOCYTES NFR BLD: 15.6 %
MAGNESIUM SERPL-MCNC: 2.21 MG/DL (ref 1.8–2.4)
MCH RBC QN AUTO: 29.8 PG (ref 26–34)
MCHC RBC AUTO-ENTMCNC: 34.8 G/DL (ref 31–36)
MCV RBC AUTO: 85.7 FL (ref 80–100)
MONOCYTES # BLD: 1.1 K/UL (ref 0–1.3)
MONOCYTES NFR BLD: 12.8 %
NEUTROPHILS # BLD: 6 K/UL (ref 1.7–7.7)
NEUTROPHILS NFR BLD: 68.1 %
PLATELET # BLD AUTO: 176 K/UL (ref 135–450)
PMV BLD AUTO: 8.3 FL (ref 5–10.5)
POTASSIUM SERPL-SCNC: 3.4 MMOL/L (ref 3.5–5.1)
RBC # BLD AUTO: 5.44 M/UL (ref 4.2–5.9)
SODIUM SERPL-SCNC: 134 MMOL/L (ref 136–145)
WBC # BLD AUTO: 8.8 K/UL (ref 4–11)

## 2025-06-29 PROCEDURE — 6370000000 HC RX 637 (ALT 250 FOR IP): Performed by: FAMILY MEDICINE

## 2025-06-29 PROCEDURE — 2500000003 HC RX 250 WO HCPCS: Performed by: FAMILY MEDICINE

## 2025-06-29 PROCEDURE — 83735 ASSAY OF MAGNESIUM: CPT

## 2025-06-29 PROCEDURE — 85025 COMPLETE CBC W/AUTO DIFF WBC: CPT

## 2025-06-29 PROCEDURE — 80048 BASIC METABOLIC PNL TOTAL CA: CPT

## 2025-06-29 PROCEDURE — 2580000003 HC RX 258: Performed by: FAMILY MEDICINE

## 2025-06-29 PROCEDURE — 1200000000 HC SEMI PRIVATE

## 2025-06-29 PROCEDURE — 6360000002 HC RX W HCPCS: Performed by: FAMILY MEDICINE

## 2025-06-29 PROCEDURE — 94761 N-INVAS EAR/PLS OXIMETRY MLT: CPT

## 2025-06-29 PROCEDURE — 94640 AIRWAY INHALATION TREATMENT: CPT

## 2025-06-29 PROCEDURE — 36415 COLL VENOUS BLD VENIPUNCTURE: CPT

## 2025-06-29 RX ORDER — SERTRALINE HYDROCHLORIDE 25 MG/1
25 TABLET, FILM COATED ORAL DAILY
Status: DISCONTINUED | OUTPATIENT
Start: 2025-06-29 | End: 2025-07-01 | Stop reason: HOSPADM

## 2025-06-29 RX ADMIN — LISINOPRIL 10 MG: 10 TABLET ORAL at 09:32

## 2025-06-29 RX ADMIN — LATANOPROST 1 DROP: 50 SOLUTION OPHTHALMIC at 21:00

## 2025-06-29 RX ADMIN — TIOTROPIUM BROMIDE INHALATION SPRAY 5 MCG: 3.12 SPRAY, METERED RESPIRATORY (INHALATION) at 07:56

## 2025-06-29 RX ADMIN — FLUCONAZOLE 200 MG: 100 TABLET ORAL at 09:31

## 2025-06-29 RX ADMIN — CEFEPIME 2000 MG: 2 INJECTION, POWDER, FOR SOLUTION INTRAVENOUS at 18:15

## 2025-06-29 RX ADMIN — METRONIDAZOLE 500 MG: 500 INJECTION, SOLUTION INTRAVENOUS at 16:23

## 2025-06-29 RX ADMIN — METRONIDAZOLE 500 MG: 500 INJECTION, SOLUTION INTRAVENOUS at 23:55

## 2025-06-29 RX ADMIN — SODIUM CHLORIDE, PRESERVATIVE FREE 10 ML: 5 INJECTION INTRAVENOUS at 09:35

## 2025-06-29 RX ADMIN — APIXABAN 5 MG: 5 TABLET, FILM COATED ORAL at 23:40

## 2025-06-29 RX ADMIN — GABAPENTIN 600 MG: 300 CAPSULE ORAL at 23:40

## 2025-06-29 RX ADMIN — FUROSEMIDE 40 MG: 10 INJECTION, SOLUTION INTRAMUSCULAR; INTRAVENOUS at 09:32

## 2025-06-29 RX ADMIN — SERTRALINE 25 MG: 25 TABLET, FILM COATED ORAL at 16:24

## 2025-06-29 RX ADMIN — LEVOTHYROXINE SODIUM 50 MCG: 0.05 TABLET ORAL at 05:32

## 2025-06-29 RX ADMIN — METRONIDAZOLE 500 MG: 500 INJECTION, SOLUTION INTRAVENOUS at 09:46

## 2025-06-29 RX ADMIN — METRONIDAZOLE 500 MG: 500 INJECTION, SOLUTION INTRAVENOUS at 00:28

## 2025-06-29 RX ADMIN — GABAPENTIN 600 MG: 300 CAPSULE ORAL at 09:31

## 2025-06-29 RX ADMIN — BUDESONIDE AND FORMOTEROL FUMARATE DIHYDRATE 2 PUFF: 80; 4.5 AEROSOL RESPIRATORY (INHALATION) at 20:20

## 2025-06-29 RX ADMIN — ATORVASTATIN CALCIUM 40 MG: 40 TABLET, FILM COATED ORAL at 23:40

## 2025-06-29 RX ADMIN — FUROSEMIDE 40 MG: 10 INJECTION, SOLUTION INTRAMUSCULAR; INTRAVENOUS at 18:17

## 2025-06-29 RX ADMIN — SODIUM CHLORIDE, PRESERVATIVE FREE 10 ML: 5 INJECTION INTRAVENOUS at 23:47

## 2025-06-29 RX ADMIN — Medication 1 CAPSULE: at 09:32

## 2025-06-29 RX ADMIN — CEFEPIME 2000 MG: 2 INJECTION, POWDER, FOR SOLUTION INTRAVENOUS at 01:35

## 2025-06-29 RX ADMIN — ENOXAPARIN SODIUM 40 MG: 100 INJECTION SUBCUTANEOUS at 09:32

## 2025-06-29 RX ADMIN — CEFEPIME 2000 MG: 2 INJECTION, POWDER, FOR SOLUTION INTRAVENOUS at 12:58

## 2025-06-29 RX ADMIN — BUDESONIDE AND FORMOTEROL FUMARATE DIHYDRATE 2 PUFF: 80; 4.5 AEROSOL RESPIRATORY (INHALATION) at 07:56

## 2025-06-29 NOTE — PROGRESS NOTES
Pt up in chair eating lunch. He is calm and pleasant. Hung abx. Pt did not state any other needs at this time.

## 2025-06-29 NOTE — PLAN OF CARE
Problem: Chronic Conditions and Co-morbidities  Goal: Patient's chronic conditions and co-morbidity symptoms are monitored and maintained or improved  6/29/2025 0806 by Edin Munoz RN  Outcome: Progressing  6/29/2025 0806 by Edin Munoz RN  Outcome: Progressing  6/29/2025 0117 by Florencia Bowling RN  Outcome: Progressing  Flowsheets (Taken 6/28/2025 2215)  Care Plan - Patient's Chronic Conditions and Co-Morbidity Symptoms are Monitored and Maintained or Improved: Monitor and assess patient's chronic conditions and comorbid symptoms for stability, deterioration, or improvement     Problem: Discharge Planning  Goal: Discharge to home or other facility with appropriate resources  6/29/2025 0806 by Edin Munoz RN  Outcome: Progressing  6/29/2025 0806 by Edin Munoz RN  Outcome: Progressing  6/29/2025 0117 by Florencia Bowling RN  Outcome: Progressing  Flowsheets (Taken 6/28/2025 2215)  Discharge to home or other facility with appropriate resources: Identify barriers to discharge with patient and caregiver     Problem: Pain  Goal: Verbalizes/displays adequate comfort level or baseline comfort level  6/29/2025 0806 by Edin Munoz RN  Outcome: Progressing  6/29/2025 0806 by Edin Munoz RN  Outcome: Progressing  6/29/2025 0117 by Florencia Bowling RN  Outcome: Progressing  Flowsheets  Taken 6/28/2025 2330  Verbalizes/displays adequate comfort level or baseline comfort level: Assess pain using appropriate pain scale  Taken 6/28/2025 2154  Verbalizes/displays adequate comfort level or baseline comfort level: Assess pain using appropriate pain scale  Taken 6/28/2025 2124  Verbalizes/displays adequate comfort level or baseline comfort level: Encourage patient to monitor pain and request assistance  Taken 6/28/2025 1915  Verbalizes/displays adequate comfort level or baseline comfort level: Encourage patient to monitor pain and request assistance     Problem: Skin/Tissue Integrity  Goal: Skin

## 2025-06-29 NOTE — PROGRESS NOTES
Department of Podiatry Progress Note  Shashi Castanon DPM Attending       CHIEF COMPLAINT:  Weeping wound with maggots, and long toenails    HISTORY OF PRESENT ILLNESS:                The patient is a 69 y.o. male with significant past medical history of Cerebrovascular Accident, Diabetes, CHF with Lymphedema who is consulted for wound of LEFT lateral leg secondary to Lymphedema that has maggot infestation, as well as chronically mycotic nails that limit ambulation    Still tenderness about lower extremities, but feels better after having performed hygiene. Still feels tired as he doesn't sleep well, an hour at a time    Past Medical History:        Diagnosis Date    Cellulitis 09/21/2023    Cerebrovascular accident (HCC) 08/05/2022    Chest pain 01/01/2023    Chest pain, rule out acute myocardial infarction 12/30/2022    Chronic deep vein thrombosis (DVT) of femoral vein of left lower extremity ([facility]) 08/21/2020    Chronic diastolic CHF (congestive heart failure) ([facility])     DM (diabetes mellitus), type 2 ([facility])     HTN (hypertension)     Neutrophilia 09/22/2023    Pain of right lower extremity 09/22/2023    Sleep apnea      Past Surgical History:    History reviewed. No pertinent surgical history.    Current Medications:    Current Facility-Administered Medications: miconazole (MICOTIN) 2 % powder, , Topical, Once  atorvastatin (LIPITOR) tablet 40 mg, 40 mg, Oral, QHS  budesonide-formoterol (SYMBICORT) 80-4.5 MCG/ACT inhaler 2 puff, 2 puff, Inhalation, BID RT **AND** tiotropium (SPIRIVA RESPIMAT) 2.5 MCG/ACT inhaler 5 mcg, 2 puff, Inhalation, Daily RT  hydrOXYzine HCl (ATARAX) tablet 25 mg, 25 mg, Oral, 4x Daily PRN  latanoprost (XALATAN) 0.005 % ophthalmic solution 1 drop, 1 drop, Both Eyes, Nightly  levothyroxine (SYNTHROID) tablet 50 mcg, 50 mcg, Oral, Daily  lisinopril (PRINIVIL;ZESTRIL) tablet 10 mg, 10 mg, Oral, Daily  nicotine (NICODERM CQ) 21 MG/24HR 1 patch, 1 patch, TransDERmal, Daily  sodium chloride flush

## 2025-06-29 NOTE — PROGRESS NOTES
Passed meds and hung abx. Pt is in bed and c/o not being able to get comfortable but still calm. Pt is in bed in lowest position with call light in reach.Pt asked for slippers which I got for him and put on.

## 2025-06-29 NOTE — PROGRESS NOTES
Dressing to BLE changed due to pt showering, dressing became wet.   RLE zinc roll gauze and ACE LLE WTDD with VASHE Roll and ACE pt tolerated well.

## 2025-06-29 NOTE — PLAN OF CARE
Problem: Chronic Conditions and Co-morbidities  Goal: Patient's chronic conditions and co-morbidity symptoms are monitored and maintained or improved  6/29/2025 0806 by Edin Munoz RN  Outcome: Progressing  6/29/2025 0117 by Florencia Bowling RN  Outcome: Progressing  Flowsheets (Taken 6/28/2025 2215)  Care Plan - Patient's Chronic Conditions and Co-Morbidity Symptoms are Monitored and Maintained or Improved: Monitor and assess patient's chronic conditions and comorbid symptoms for stability, deterioration, or improvement     Problem: Discharge Planning  Goal: Discharge to home or other facility with appropriate resources  6/29/2025 0806 by Edin Munoz RN  Outcome: Progressing  6/29/2025 0117 by Florencia Bowling RN  Outcome: Progressing  Flowsheets (Taken 6/28/2025 2215)  Discharge to home or other facility with appropriate resources: Identify barriers to discharge with patient and caregiver     Problem: Pain  Goal: Verbalizes/displays adequate comfort level or baseline comfort level  6/29/2025 0806 by Edin Munoz RN  Outcome: Progressing  6/29/2025 0117 by Florencia Bowling RN  Outcome: Progressing  Flowsheets  Taken 6/28/2025 2330  Verbalizes/displays adequate comfort level or baseline comfort level: Assess pain using appropriate pain scale  Taken 6/28/2025 2154  Verbalizes/displays adequate comfort level or baseline comfort level: Assess pain using appropriate pain scale  Taken 6/28/2025 2124  Verbalizes/displays adequate comfort level or baseline comfort level: Encourage patient to monitor pain and request assistance  Taken 6/28/2025 1915  Verbalizes/displays adequate comfort level or baseline comfort level: Encourage patient to monitor pain and request assistance     Problem: Skin/Tissue Integrity  Goal: Skin integrity remains intact  Description: 1.  Monitor for areas of redness and/or skin breakdown  2.  Assess vascular access sites hourly  3.  Every 4-6 hours minimum:  Change oxygen  stability  6/29/2025 0806 by Edin Munoz RN  Outcome: Progressing  6/29/2025 0117 by Florencia Bowling RN  Outcome: Progressing  Flowsheets (Taken 6/28/2025 2215)  Maintains optimal cardiac output and hemodynamic stability: Monitor blood pressure and heart rate  Goal: Absence of cardiac dysrhythmias or at baseline  6/29/2025 0806 by Edin Munoz RN  Outcome: Progressing  6/29/2025 0117 by Florencia Bowling RN  Outcome: Progressing  Flowsheets (Taken 6/28/2025 2215)  Absence of cardiac dysrhythmias or at baseline: Monitor cardiac rate and rhythm     Problem: Skin/Tissue Integrity - Adult  Goal: Skin integrity remains intact  Description: 1.  Monitor for areas of redness and/or skin breakdown  2.  Assess vascular access sites hourly  3.  Every 4-6 hours minimum:  Change oxygen saturation probe site  4.  Every 4-6 hours:  If on nasal continuous positive airway pressure, respiratory therapy assess nares and determine need for appliance change or resting period  6/29/2025 0806 by Edin Munoz RN  Outcome: Progressing  6/29/2025 0117 by Florencia Bowling RN  Outcome: Progressing  Flowsheets  Taken 6/29/2025 0112  Skin Integrity Remains Intact: Monitor for areas of redness and/or skin breakdown  Taken 6/28/2025 2215  Skin Integrity Remains Intact: Monitor for areas of redness and/or skin breakdown  Goal: Incisions, wounds, or drain sites healing without S/S of infection  6/29/2025 0806 by Edin Munoz RN  Outcome: Progressing  6/29/2025 0117 by Florencia Bowling RN  Outcome: Progressing  Flowsheets  Taken 6/29/2025 0112  Incisions, Wounds, or Drain Sites Healing Without Sign and Symptoms of Infection: TWICE DAILY: Assess and document dressing/incision, wound bed, drain sites and surrounding tissue  Taken 6/28/2025 2215  Incisions, Wounds, or Drain Sites Healing Without Sign and Symptoms of Infection: TWICE DAILY: Assess and document skin integrity  Goal: Oral mucous membranes remain

## 2025-06-29 NOTE — PROGRESS NOTES
V2.0    [facility] [place]      Name:  Calixto Steen /Age/Sex: 1955  (69 y.o. male)   MRN & CSN:  [id number] & [id number] Encounter Date/Time: 2025 4:56 PM EDT   Location:  [id number] PCP: Greer Hampton PA-C     Attending:Racheal Lan MD       Hospital Day: 3    Assessment and Recommendations   Calixto Steen is a 69 y.o. male who presents with Cellulitis      Plan:     Left leg cellulitis with maggots  Severe left leg lymphedema  - Cont cefepime, flagyl IV  - consult ID  - consult vascular surgery: No surgical intervention  - wound care  - podiatry: No surgical intervention, foot and toenail care provided  - hold Eliquis for now until confirmed that there is no surgery needed  - change oral lasix to 40mg IV BID for now to help with swelling  - probiotic     Intertriginous dermatitis  - topical and oral diflucan    Frequent loose stools daily  -He states has been going on for the past several weeks but then states he will have episodes of normal bowel movements  -Avoid Lomotil that may induce constipation  -Start probiotic  -Consider GI consult on Monday     COPD:  - change inhaler to home formulary symbicort and spiriva     Morbid obesity:  BMI 48  - increases morbidity and mortality risk  - weight loss counseling as appropriate     Hypothyroidism:  cont synthroid  Chronic pain:  cont gabapentin      Diet ADULT DIET; Regular   DVT Prophylaxis [] Lovenox, []  Heparin, [] SCDs, [] Ambulation,  [] Eliquis, [] Xarelto  [] Coumadin   Code Status Full Code             Personally reviewed Lab Studies and Imaging         Subjective:     Chief Complaint:     No new complaints    Review of Systems:      Pertinent positives and negatives discussed in HPI    Objective:     Intake/Output Summary (Last 24 hours) at 2025 1504  Last data filed at 2025 1428  Gross per 24 hour   Intake 2100 ml   Output 3850 ml   Net -1750 ml      Vitals:   Vitals:    25 2039 25 2330 25 0200  06/29/25 0756   BP:  104/65     Pulse: 67 80     Resp: 19 18     Temp:  98.6 °F (37 °C)     TempSrc:  Oral     SpO2: 98% 99%  98%   Weight:   (!) 165.1 kg (363 lb 15.7 oz)    Height:             Physical Exam:      General: NAD  Eyes: EOMI  ENT: neck supple  Cardiovascular: Regular rate.  Respiratory: Clear to auscultation  Gastrointestinal: Soft, non tender  Genitourinary: no suprapubic tenderness  Musculoskeletal: Severe bilateral venous lower extremity venous stasis  Appears to move all extremities well  Skin: chronic venous stasis with dermatitis of lower legs bilaterally, left lower leg with erythema and shallow ulcerations  Neuro: Alert.  Psych: Mood appropriate.         Medications:   Medications:    miconazole   Topical Once    atorvastatin  40 mg Oral QHS    budesonide-formoterol  2 puff Inhalation BID RT    And    tiotropium  2 puff Inhalation Daily RT    latanoprost  1 drop Both Eyes Nightly    levothyroxine  50 mcg Oral Daily    lisinopril  10 mg Oral Daily    nicotine  1 patch TransDERmal Daily    sodium chloride flush  5-40 mL IntraVENous 2 times per day    enoxaparin  40 mg SubCUTAneous BID    fluconazole  200 mg Oral Daily    metroNIDAZOLE  500 mg IntraVENous Q8H    lactobacillus  1 capsule Oral Daily with breakfast    furosemide  40 mg IntraVENous BID    cefepime  2,000 mg IntraVENous Q8H    mineral oil-hydrophilic petrolatum   Topical Daily    gabapentin  600 mg Oral BID      Infusions:    sodium chloride       PRN Meds: hydrOXYzine HCl, 25 mg, 4x Daily PRN  sodium chloride flush, 5-40 mL, PRN  sodium chloride, , PRN  potassium chloride, 40 mEq, PRN   Or  potassium alternative oral replacement, 40 mEq, PRN   Or  potassium chloride, 10 mEq, PRN  magnesium sulfate, 2,000 mg, PRN  ondansetron, 4 mg, Q8H PRN   Or  ondansetron, 4 mg, Q6H PRN  polyethylene glycol, 17 g, Daily PRN  acetaminophen, 650 mg, Q6H PRN   Or  acetaminophen, 650 mg, Q6H PRN  HYDROmorphone, 1 mg, Q4H PRN        Labs and Imaging

## 2025-06-29 NOTE — PLAN OF CARE
Problem: Chronic Conditions and Co-morbidities  Goal: Patient's chronic conditions and co-morbidity symptoms are monitored and maintained or improved  Outcome: Progressing  Flowsheets (Taken 6/28/2025 2215)  Care Plan - Patient's Chronic Conditions and Co-Morbidity Symptoms are Monitored and Maintained or Improved: Monitor and assess patient's chronic conditions and comorbid symptoms for stability, deterioration, or improvement     Problem: Discharge Planning  Goal: Discharge to home or other facility with appropriate resources  Outcome: Progressing  Flowsheets (Taken 6/28/2025 2215)  Discharge to home or other facility with appropriate resources: Identify barriers to discharge with patient and caregiver     Problem: Pain  Goal: Verbalizes/displays adequate comfort level or baseline comfort level  Outcome: Progressing  Flowsheets  Taken 6/28/2025 2330  Verbalizes/displays adequate comfort level or baseline comfort level: Assess pain using appropriate pain scale  Taken 6/28/2025 2154  Verbalizes/displays adequate comfort level or baseline comfort level: Assess pain using appropriate pain scale  Taken 6/28/2025 2124  Verbalizes/displays adequate comfort level or baseline comfort level: Encourage patient to monitor pain and request assistance  Taken 6/28/2025 1915  Verbalizes/displays adequate comfort level or baseline comfort level: Encourage patient to monitor pain and request assistance     Problem: Skin/Tissue Integrity  Goal: Skin integrity remains intact  Description: 1.  Monitor for areas of redness and/or skin breakdown  2.  Assess vascular access sites hourly  3.  Every 4-6 hours minimum:  Change oxygen saturation probe site  4.  Every 4-6 hours:  If on nasal continuous positive airway pressure, respiratory therapy assess nares and determine need for appliance change or resting period  Outcome: Progressing  Flowsheets  Taken 6/29/2025 0112  Skin Integrity Remains Intact: Monitor for areas of redness and/or  skin breakdown  Taken 6/28/2025 2215  Skin Integrity Remains Intact: Monitor for areas of redness and/or skin breakdown     Problem: Safety - Adult  Goal: Free from fall injury  Outcome: Progressing  Flowsheets (Taken 6/29/2025 0112)  Free From Fall Injury: Instruct family/caregiver on patient safety     Problem: Neurosensory - Adult  Goal: Achieves stable or improved neurological status  Outcome: Progressing  Flowsheets (Taken 6/28/2025 2215)  Achieves stable or improved neurological status: Assess for and report changes in neurological status  Goal: Absence of seizures  Outcome: Progressing  Flowsheets (Taken 6/28/2025 2215)  Absence of seizures: Monitor for seizure activity.  If seizure occurs, document type and location of movements and any associated apnea  Goal: Achieves maximal functionality and self care  Outcome: Progressing  Flowsheets (Taken 6/28/2025 2215)  Achieves maximal functionality and self care: Monitor swallowing and airway patency with patient fatigue and changes in neurological status     Problem: Cardiovascular - Adult  Goal: Maintains optimal cardiac output and hemodynamic stability  Outcome: Progressing  Flowsheets (Taken 6/28/2025 2215)  Maintains optimal cardiac output and hemodynamic stability: Monitor blood pressure and heart rate  Goal: Absence of cardiac dysrhythmias or at baseline  Outcome: Progressing  Flowsheets (Taken 6/28/2025 2215)  Absence of cardiac dysrhythmias or at baseline: Monitor cardiac rate and rhythm     Problem: Skin/Tissue Integrity - Adult  Goal: Skin integrity remains intact  Description: 1.  Monitor for areas of redness and/or skin breakdown  2.  Assess vascular access sites hourly  3.  Every 4-6 hours minimum:  Change oxygen saturation probe site  4.  Every 4-6 hours:  If on nasal continuous positive airway pressure, respiratory therapy assess nares and determine need for appliance change or resting period  Outcome: Progressing  Flowsheets  Taken 6/29/2025

## 2025-06-29 NOTE — PROGRESS NOTES
When Pt was getting IV lasix and Dilaudid, IV starting to leak, nurse attempted x2 to re-establish IV access, Nu success. Notified Charge that IV access was needed.

## 2025-06-30 PROBLEM — A49.8 INFECTION DUE TO STENOTROPHOMONAS MALTOPHILIA: Status: ACTIVE | Noted: 2025-06-30

## 2025-06-30 LAB
ANION GAP SERPL CALCULATED.3IONS-SCNC: 11 MMOL/L (ref 3–16)
BASOPHILS # BLD: 0 K/UL (ref 0–0.2)
BASOPHILS NFR BLD: 0.7 %
BUN SERPL-MCNC: 15 MG/DL (ref 7–20)
CALCIUM SERPL-MCNC: 8.4 MG/DL (ref 8.3–10.6)
CHLORIDE SERPL-SCNC: 100 MMOL/L (ref 99–110)
CO2 SERPL-SCNC: 26 MMOL/L (ref 21–32)
CREAT SERPL-MCNC: 0.9 MG/DL (ref 0.8–1.3)
DEPRECATED RDW RBC AUTO: 14.6 % (ref 12.4–15.4)
EOSINOPHIL # BLD: 0.3 K/UL (ref 0–0.6)
EOSINOPHIL NFR BLD: 3.7 %
GFR SERPLBLD CREATININE-BSD FMLA CKD-EPI: >90 ML/MIN/{1.73_M2}
GLUCOSE SERPL-MCNC: 121 MG/DL (ref 70–99)
HCT VFR BLD AUTO: 46 % (ref 40.5–52.5)
HGB BLD-MCNC: 15.6 G/DL (ref 13.5–17.5)
LYMPHOCYTES # BLD: 1.3 K/UL (ref 1–5.1)
LYMPHOCYTES NFR BLD: 18.6 %
MAGNESIUM SERPL-MCNC: 2.13 MG/DL (ref 1.8–2.4)
MCH RBC QN AUTO: 29.3 PG (ref 26–34)
MCHC RBC AUTO-ENTMCNC: 33.9 G/DL (ref 31–36)
MCV RBC AUTO: 86.5 FL (ref 80–100)
MONOCYTES # BLD: 0.9 K/UL (ref 0–1.3)
MONOCYTES NFR BLD: 12.6 %
NEUTROPHILS # BLD: 4.5 K/UL (ref 1.7–7.7)
NEUTROPHILS NFR BLD: 64.4 %
PLATELET # BLD AUTO: 207 K/UL (ref 135–450)
PMV BLD AUTO: 8.7 FL (ref 5–10.5)
POTASSIUM SERPL-SCNC: 3.5 MMOL/L (ref 3.5–5.1)
RBC # BLD AUTO: 5.32 M/UL (ref 4.2–5.9)
SODIUM SERPL-SCNC: 137 MMOL/L (ref 136–145)
WBC # BLD AUTO: 7 K/UL (ref 4–11)

## 2025-06-30 PROCEDURE — 2500000003 HC RX 250 WO HCPCS: Performed by: FAMILY MEDICINE

## 2025-06-30 PROCEDURE — G0545 PR INHERENT VISIT TO INPT: HCPCS | Performed by: INTERNAL MEDICINE

## 2025-06-30 PROCEDURE — 6370000000 HC RX 637 (ALT 250 FOR IP): Performed by: INTERNAL MEDICINE

## 2025-06-30 PROCEDURE — 6370000000 HC RX 637 (ALT 250 FOR IP): Performed by: FAMILY MEDICINE

## 2025-06-30 PROCEDURE — 9990000010 HC NO CHARGE VISIT

## 2025-06-30 PROCEDURE — 6360000002 HC RX W HCPCS: Performed by: FAMILY MEDICINE

## 2025-06-30 PROCEDURE — 83735 ASSAY OF MAGNESIUM: CPT

## 2025-06-30 PROCEDURE — 99233 SBSQ HOSP IP/OBS HIGH 50: CPT | Performed by: INTERNAL MEDICINE

## 2025-06-30 PROCEDURE — 80048 BASIC METABOLIC PNL TOTAL CA: CPT

## 2025-06-30 PROCEDURE — 2580000003 HC RX 258: Performed by: FAMILY MEDICINE

## 2025-06-30 PROCEDURE — 1200000000 HC SEMI PRIVATE

## 2025-06-30 PROCEDURE — 85025 COMPLETE CBC W/AUTO DIFF WBC: CPT

## 2025-06-30 PROCEDURE — 36415 COLL VENOUS BLD VENIPUNCTURE: CPT

## 2025-06-30 PROCEDURE — 94760 N-INVAS EAR/PLS OXIMETRY 1: CPT

## 2025-06-30 PROCEDURE — 94640 AIRWAY INHALATION TREATMENT: CPT

## 2025-06-30 RX ORDER — GUAIFENESIN/DEXTROMETHORPHAN 100-10MG/5
5 SYRUP ORAL EVERY 4 HOURS PRN
Status: DISCONTINUED | OUTPATIENT
Start: 2025-06-30 | End: 2025-07-01 | Stop reason: HOSPADM

## 2025-06-30 RX ORDER — METRONIDAZOLE 500 MG/1
500 TABLET ORAL EVERY 8 HOURS SCHEDULED
Status: DISCONTINUED | OUTPATIENT
Start: 2025-06-30 | End: 2025-07-01 | Stop reason: HOSPADM

## 2025-06-30 RX ADMIN — HYDROMORPHONE HYDROCHLORIDE 1 MG: 1 INJECTION, SOLUTION INTRAMUSCULAR; INTRAVENOUS; SUBCUTANEOUS at 00:00

## 2025-06-30 RX ADMIN — Medication 1 CAPSULE: at 08:55

## 2025-06-30 RX ADMIN — WHITE PETROLATUM: 1.75 OINTMENT TOPICAL at 08:57

## 2025-06-30 RX ADMIN — APIXABAN 5 MG: 5 TABLET, FILM COATED ORAL at 21:50

## 2025-06-30 RX ADMIN — SODIUM CHLORIDE, PRESERVATIVE FREE 10 ML: 5 INJECTION INTRAVENOUS at 21:51

## 2025-06-30 RX ADMIN — LATANOPROST 1 DROP: 50 SOLUTION OPHTHALMIC at 21:58

## 2025-06-30 RX ADMIN — CEFEPIME 2000 MG: 2 INJECTION, POWDER, FOR SOLUTION INTRAVENOUS at 09:12

## 2025-06-30 RX ADMIN — LISINOPRIL 10 MG: 10 TABLET ORAL at 08:56

## 2025-06-30 RX ADMIN — CEFEPIME 2000 MG: 2 INJECTION, POWDER, FOR SOLUTION INTRAVENOUS at 18:06

## 2025-06-30 RX ADMIN — APIXABAN 5 MG: 5 TABLET, FILM COATED ORAL at 08:55

## 2025-06-30 RX ADMIN — FUROSEMIDE 40 MG: 10 INJECTION, SOLUTION INTRAMUSCULAR; INTRAVENOUS at 08:55

## 2025-06-30 RX ADMIN — SODIUM CHLORIDE, PRESERVATIVE FREE 10 ML: 5 INJECTION INTRAVENOUS at 08:55

## 2025-06-30 RX ADMIN — METRONIDAZOLE 500 MG: 500 INJECTION, SOLUTION INTRAVENOUS at 09:10

## 2025-06-30 RX ADMIN — CEFEPIME 2000 MG: 2 INJECTION, POWDER, FOR SOLUTION INTRAVENOUS at 02:30

## 2025-06-30 RX ADMIN — GUAIFENESIN SYRUP AND DEXTROMETHORPHAN 5 ML: 100; 10 SYRUP ORAL at 22:15

## 2025-06-30 RX ADMIN — GABAPENTIN 600 MG: 300 CAPSULE ORAL at 21:50

## 2025-06-30 RX ADMIN — HYDROMORPHONE HYDROCHLORIDE 1 MG: 1 INJECTION, SOLUTION INTRAMUSCULAR; INTRAVENOUS; SUBCUTANEOUS at 21:58

## 2025-06-30 RX ADMIN — FUROSEMIDE 40 MG: 10 INJECTION, SOLUTION INTRAMUSCULAR; INTRAVENOUS at 17:58

## 2025-06-30 RX ADMIN — SERTRALINE 25 MG: 25 TABLET, FILM COATED ORAL at 08:54

## 2025-06-30 RX ADMIN — LEVOTHYROXINE SODIUM 50 MCG: 0.05 TABLET ORAL at 06:18

## 2025-06-30 RX ADMIN — GABAPENTIN 600 MG: 300 CAPSULE ORAL at 08:55

## 2025-06-30 RX ADMIN — BUDESONIDE AND FORMOTEROL FUMARATE DIHYDRATE 2 PUFF: 80; 4.5 AEROSOL RESPIRATORY (INHALATION) at 19:15

## 2025-06-30 RX ADMIN — METRONIDAZOLE 500 MG: 500 TABLET ORAL at 21:50

## 2025-06-30 RX ADMIN — TIOTROPIUM BROMIDE INHALATION SPRAY 5 MCG: 3.12 SPRAY, METERED RESPIRATORY (INHALATION) at 09:02

## 2025-06-30 RX ADMIN — ATORVASTATIN CALCIUM 40 MG: 40 TABLET, FILM COATED ORAL at 21:50

## 2025-06-30 RX ADMIN — METRONIDAZOLE 500 MG: 500 TABLET ORAL at 15:14

## 2025-06-30 RX ADMIN — FLUCONAZOLE 200 MG: 100 TABLET ORAL at 08:52

## 2025-06-30 RX ADMIN — BUDESONIDE AND FORMOTEROL FUMARATE DIHYDRATE 2 PUFF: 80; 4.5 AEROSOL RESPIRATORY (INHALATION) at 09:04

## 2025-06-30 ASSESSMENT — PAIN DESCRIPTION - ORIENTATION
ORIENTATION: LEFT
ORIENTATION: INNER

## 2025-06-30 ASSESSMENT — PAIN SCALES - GENERAL
PAINLEVEL_OUTOF10: 0
PAINLEVEL_OUTOF10: 8
PAINLEVEL_OUTOF10: 8

## 2025-06-30 ASSESSMENT — PAIN DESCRIPTION - LOCATION
LOCATION: LEG
LOCATION: LEG

## 2025-06-30 ASSESSMENT — PAIN DESCRIPTION - DESCRIPTORS
DESCRIPTORS: STABBING;BURNING
DESCRIPTORS: STABBING

## 2025-06-30 NOTE — PROGRESS NOTES
Physical Therapy    Calixto Steen  6/30/2025    -chart reviewed  -nursing notes indicate he is using rollator - refusing assist   -will check on Tuesday AM to assess if able to manage basic one floor household distances using his rollator    Electronically signed by VANI BARRIENTOS, PT on 6/30/2025 at 3:09 PM

## 2025-06-30 NOTE — PLAN OF CARE
Problem: Chronic Conditions and Co-morbidities  Goal: Patient's chronic conditions and co-morbidity symptoms are monitored and maintained or improved  6/30/2025 1100 by Dhara Ward RN  Outcome: Progressing  Flowsheets (Taken 6/30/2025 0855)  Care Plan - Patient's Chronic Conditions and Co-Morbidity Symptoms are Monitored and Maintained or Improved: Monitor and assess patient's chronic conditions and comorbid symptoms for stability, deterioration, or improvement  6/30/2025 0502 by Lisset Nix RN  Outcome: Progressing  Flowsheets (Taken 6/30/2025 0502)  Care Plan - Patient's Chronic Conditions and Co-Morbidity Symptoms are Monitored and Maintained or Improved: Monitor and assess patient's chronic conditions and comorbid symptoms for stability, deterioration, or improvement  6/30/2025 0435 by Lisset Nix RN  Outcome: Progressing  Flowsheets (Taken 6/30/2025 0435)  Care Plan - Patient's Chronic Conditions and Co-Morbidity Symptoms are Monitored and Maintained or Improved: Monitor and assess patient's chronic conditions and comorbid symptoms for stability, deterioration, or improvement     Problem: Discharge Planning  Goal: Discharge to home or other facility with appropriate resources  6/30/2025 1100 by Dhara Ward RN  Outcome: Progressing  Flowsheets (Taken 6/30/2025 0855)  Discharge to home or other facility with appropriate resources: Identify barriers to discharge with patient and caregiver  6/30/2025 0502 by Lisset Nix RN  Outcome: Progressing  Flowsheets (Taken 6/30/2025 0502)  Discharge to home or other facility with appropriate resources:   Identify barriers to discharge with patient and caregiver   Identify discharge learning needs (meds, wound care, etc)  6/30/2025 0435 by Lisset Nix RN  Flowsheets (Taken 6/30/2025 0435)  Discharge to home or other facility with appropriate resources:   Identify barriers to discharge with patient and caregiver   Identify

## 2025-06-30 NOTE — PROGRESS NOTES
Department of Podiatry Progress Note  Shashi Castanon DPM Attending       CHIEF COMPLAINT:  Weeping wound with maggots, and long toenails    HISTORY OF PRESENT ILLNESS:                The patient is a 69 y.o. male with significant past medical history of Cerebrovascular Accident, Diabetes, CHF with Lymphedema who is consulted for wound of LEFT lateral leg secondary to Lymphedema that has maggot infestation, as well as chronically mycotic nails that limit ambulation    Up to chair with elevation of lower extremities    Past Medical History:        Diagnosis Date    Cellulitis 09/21/2023    Cerebrovascular accident (HCC) 08/05/2022    Chest pain 01/01/2023    Chest pain, rule out acute myocardial infarction 12/30/2022    Chronic deep vein thrombosis (DVT) of femoral vein of left lower extremity (HCC) 08/21/2020    Chronic diastolic CHF (congestive heart failure) (HCC)     DM (diabetes mellitus), type 2 (HCC)     HTN (hypertension)     Neutrophilia 09/22/2023    Pain of right lower extremity 09/22/2023    Sleep apnea      Past Surgical History:    History reviewed. No pertinent surgical history.    Current Medications:    Current Facility-Administered Medications: metroNIDAZOLE (FLAGYL) tablet 500 mg, 500 mg, Oral, 3 times per day  guaiFENesin-dextromethorphan (ROBITUSSIN DM) 100-10 MG/5ML syrup 5 mL, 5 mL, Oral, Q4H PRN  sertraline (ZOLOFT) tablet 25 mg, 25 mg, Oral, Daily  apixaban (ELIQUIS) tablet 5 mg, 5 mg, Oral, BID  miconazole (MICOTIN) 2 % powder, , Topical, Once  atorvastatin (LIPITOR) tablet 40 mg, 40 mg, Oral, QHS  budesonide-formoterol (SYMBICORT) 80-4.5 MCG/ACT inhaler 2 puff, 2 puff, Inhalation, BID RT **AND** tiotropium (SPIRIVA RESPIMAT) 2.5 MCG/ACT inhaler 5 mcg, 2 puff, Inhalation, Daily RT  hydrOXYzine HCl (ATARAX) tablet 25 mg, 25 mg, Oral, 4x Daily PRN  latanoprost (XALATAN) 0.005 % ophthalmic solution 1 drop, 1 drop, Both Eyes, Nightly  levothyroxine (SYNTHROID) tablet 50 mcg, 50 mcg, Oral,

## 2025-06-30 NOTE — PROGRESS NOTES
V2.0    [facility] [place] Progress Note      Name:  Calixto Steen /Age/Sex: 1955  (69 y.o. male)   MRN & CSN:  [id number] & [id number] Encounter Date/Time: 2025 4:56 PM EDT   Location:  [id number] PCP: Greer Hampton PA-C     Attending:Racheal Lan MD       Hospital Day: 4    Assessment and Recommendations   Calixto Steen is a 69 y.o. male who presents with Cellulitis      Plan:     Left leg cellulitis with maggots  Severe left leg lymphedema  - Cont cefepime, flagyl IV  - consult ID  - consult vascular surgery: No surgical intervention  - wound care  - podiatry: No surgical intervention, foot and toenail care provided  - changed oral lasix to 40mg IV BID for now to help with swelling  - probiotic     Intertriginous dermatitis  - topical and oral diflucan    Frequent loose stools daily:  improving  -He states has been going on for the past several weeks but then states he will have episodes of normal bowel movements  -Avoid Lomotil that may induce constipation  -Start probiotic  -Consider GI consult      COPD:  - change inhaler to home formulary symbicort and spiriva     Morbid obesity:  BMI 48  - increases morbidity and mortality risk  - weight loss counseling as appropriate     Hypothyroidism:  cont synthroid  Chronic pain:  cont gabapentin      Diet ADULT DIET; Regular   DVT Prophylaxis [] Lovenox, []  Heparin, [] SCDs, [] Ambulation,  [] Eliquis, [] Xarelto  [] Coumadin   Code Status Full Code             Personally reviewed Lab Studies and Imaging         Subjective:     Chief Complaint:     Stool pattern improving.  Still having pain in leg    Review of Systems:      Pertinent positives and negatives discussed in HPI    Objective:     Intake/Output Summary (Last 24 hours) at 2025 1750  Last data filed at 2025 1515  Gross per 24 hour   Intake 400 ml   Output 2650 ml   Net -2250 ml      Vitals:   Vitals:    25 2020 25 0000 25 0902 25 0906   BP:    111/72   Pulse:

## 2025-06-30 NOTE — PLAN OF CARE
associated apnea     Problem: Neurosensory - Adult  Goal: Achieves maximal functionality and self care  Outcome: Progressing  Flowsheets (Taken 6/30/2025 0435)  Achieves maximal functionality and self care: Monitor swallowing and airway patency with patient fatigue and changes in neurological status     Problem: Cardiovascular - Adult  Goal: Maintains optimal cardiac output and hemodynamic stability  Outcome: Progressing  Flowsheets (Taken 6/30/2025 0435)  Maintains optimal cardiac output and hemodynamic stability: Monitor blood pressure and heart rate     Problem: Cardiovascular - Adult  Goal: Absence of cardiac dysrhythmias or at baseline  Outcome: Progressing  Flowsheets (Taken 6/30/2025 0435)  Absence of cardiac dysrhythmias or at baseline: Monitor cardiac rate and rhythm     Problem: Skin/Tissue Integrity - Adult  Goal: Skin integrity remains intact  Description: 1.  Monitor for areas of redness and/or skin breakdown  2.  Assess vascular access sites hourly  3.  Every 4-6 hours minimum:  Change oxygen saturation probe site  4.  Every 4-6 hours:  If on nasal continuous positive airway pressure, respiratory therapy assess nares and determine need for appliance change or resting period  Outcome: Progressing     Problem: Skin/Tissue Integrity - Adult  Goal: Incisions, wounds, or drain sites healing without S/S of infection  Outcome: Progressing  Flowsheets (Taken 6/30/2025 0435)  Incisions, Wounds, or Drain Sites Healing Without Sign and Symptoms of Infection: TWICE DAILY: Assess and document skin integrity     Problem: Skin/Tissue Integrity - Adult  Goal: Oral mucous membranes remain intact  Outcome: Progressing  Flowsheets (Taken 6/30/2025 0435)  Oral Mucous Membranes Remain Intact: Assess oral mucosa and hygiene practices     Problem: Musculoskeletal - Adult  Goal: Return mobility to safest level of function  Outcome: Progressing  Flowsheets (Taken 6/30/2025 0435)  Return Mobility to Safest Level of Function:    Assess patient stability and activity tolerance for standing, transferring and ambulating with or without assistive devices   Assist with transfers and ambulation using safe patient handling equipment as needed     Problem: Musculoskeletal - Adult  Goal: Maintain proper alignment of affected body part  Outcome: Progressing  Flowsheets (Taken 6/30/2025 0435)  Maintain proper alignment of affected body part: Support and protect limb and body alignment per provider's orders     Problem: Gastrointestinal - Adult  Goal: Minimal or absence of nausea and vomiting  Outcome: Progressing  Flowsheets (Taken 6/30/2025 0435)  Minimal or absence of nausea and vomiting:   Administer IV fluids as ordered to ensure adequate hydration   Administer ordered antiemetic medications as needed     Problem: Gastrointestinal - Adult  Goal: Maintains or returns to baseline bowel function  Outcome: Progressing  Flowsheets (Taken 6/30/2025 0435)  Maintains or returns to baseline bowel function: Assess bowel function     Problem: Gastrointestinal - Adult  Goal: Maintains adequate nutritional intake  Outcome: Progressing  Flowsheets (Taken 6/30/2025 0435)  Maintains adequate nutritional intake: Assist with meals as needed     Problem: Metabolic/Fluid and Electrolytes - Adult  Goal: Electrolytes maintained within normal limits  Outcome: Progressing  Flowsheets (Taken 6/30/2025 0435)  Electrolytes maintained within normal limits: Monitor labs and assess patient for signs and symptoms of electrolyte imbalances     Problem: Metabolic/Fluid and Electrolytes - Adult  Goal: Hemodynamic stability and optimal renal function maintained  Outcome: Progressing  Flowsheets (Taken 6/30/2025 0435)  Hemodynamic stability and optimal renal function maintained: Monitor labs and assess for signs and symptoms of volume excess or deficit     Problem: Metabolic/Fluid and Electrolytes - Adult  Goal: Glucose maintained within prescribed range  Outcome:

## 2025-06-30 NOTE — PLAN OF CARE
Problem: Chronic Conditions and Co-morbidities  Goal: Patient's chronic conditions and co-morbidity symptoms are monitored and maintained or improved  6/30/2025 0502 by Lisset Nix RN  Outcome: Progressing  Flowsheets (Taken 6/30/2025 0502)  Care Plan - Patient's Chronic Conditions and Co-Morbidity Symptoms are Monitored and Maintained or Improved: Monitor and assess patient's chronic conditions and comorbid symptoms for stability, deterioration, or improvement     Problem: Discharge Planning  Goal: Discharge to home or other facility with appropriate resources  6/30/2025 0502 by Lisset Nix, RN  Outcome: Progressing  Flowsheets (Taken 6/30/2025 0502)  Discharge to home or other facility with appropriate resources:   Identify barriers to discharge with patient and caregiver   Identify discharge learning needs (meds, wound care, etc)     Problem: Pain  Goal: Verbalizes/displays adequate comfort level or baseline comfort level  6/30/2025 0502 by Lisset Nix, RN  Outcome: Progressing  Flowsheets (Taken 6/30/2025 0502)  Verbalizes/displays adequate comfort level or baseline comfort level:   Encourage patient to monitor pain and request assistance   Assess pain using appropriate pain scale   Administer analgesics based on type and severity of pain and evaluate response     Problem: Skin/Tissue Integrity  Goal: Skin integrity remains intact  Description: 1.  Monitor for areas of redness and/or skin breakdown  2.  Assess vascular access sites hourly  3.  Every 4-6 hours minimum:  Change oxygen saturation probe site  4.  Every 4-6 hours:  If on nasal continuous positive airway pressure, respiratory therapy assess nares and determine need for appliance change or resting period  6/30/2025 0502 by Lisset Nix, RN  Outcome: Progressing  Flowsheets (Taken 6/30/2025 0502)  Skin Integrity Remains Intact: Monitor for areas of redness and/or skin breakdown     Problem: Safety - Adult  Goal: Free from

## 2025-06-30 NOTE — PROGRESS NOTES
Infectious Diseases Inpatient Progress Note        CHIEF COMPLAINT:       Bilateral lower extremity venous stasis ulceration  Left leg cellulitis  Left leg open wound  Maggot infestation  Poor skin hygiene  Recurrent cellulitis    HISTORY OF PRESENT ILLNESS:  69 y.o. male with a significant history for morbid obesity BMI at 48, hypertension, CVA, diabetes, poor mobility sleep apnea admitted to hospital secondary to left leg swelling redness nonhealing wound patient noted maggots coming off when he took the Ace wrap's down he was also complaining of some shortness of breath and chest pain and to present to the hospital. He had long standing venous ulcer for nearly 10 YRS per patient and wounds never healed and he has on going bi lateral lymph edema. labs indicate creatinine 1 sodium 136 LFT normal WBC normal hemoglobin 16.9 blood cultures in process.  We are consulted for recommendations      Interval history: Complains of ongoing lower extremity edema ongoing drainage localized pain WBC normal creatinine stable, blood cultures negative wound culture in process    Past Medical History:    Past Medical History:   Diagnosis Date    Cellulitis 09/21/2023    Cerebrovascular accident (HCC) 08/05/2022    Chest pain 01/01/2023    Chest pain, rule out acute myocardial infarction 12/30/2022    Chronic deep vein thrombosis (DVT) of femoral vein of left lower extremity ([facility]) 08/21/2020    Chronic diastolic CHF (congestive heart failure) ([facility])     DM (diabetes mellitus), type 2 ([facility])     HTN (hypertension)     Neutrophilia 09/22/2023    Pain of right lower extremity 09/22/2023    Sleep apnea        Past Surgical History:    History reviewed. No pertinent surgical history.    Current Medications:    Outpatient Medications Marked as Taking for the 6/27/25 encounter (Hospital Encounter)   Medication Sig Dispense Refill    sertraline (ZOLOFT) 25 MG tablet Take 1 tablet by mouth daily      furosemide (LASIX) 40 MG tablet Take 1

## 2025-06-30 NOTE — PROGRESS NOTES
Patient awake up in chair, A&O X4. VSS. PIV flushed, and infusing at this time, dressing CDI at this time. Patient denies pain.  AM medications taken whole without complaint (see eMAR).  Patient tolerating PO intake and appetite adequate. No other needs verbalized at this time. Standard safety precautions in place, however patient still refusing alarms, call light within reach.

## 2025-06-30 NOTE — CARE COORDINATION
Per PRASHANT Ayoub note of 6/27/25:  \"  Discharge Planning:  Received a call from [facility] and they advised that they are unable to accept patient due to him being on the sex offender registry.     Discussed with patient , he acknowledge that he is on the Registry.  Discussed that the only SNF to accept Sex offenders is [facility],patient reported that he has been there before, took him a year to get out.  Advised that [facility] [place] the [place]  Other SNF's in [place], [place], [facility], [place], [place], [place].  List given to patient.     Referral sent to [facility].            Electronically signed by Kandis Dunn LSW at 6/27/2025  6:05 PM\"      [facility] is unable to accept patient due to his previous non-compliance at this snf. At this time, no snf has been identified that can accept this patient.     Electronically signed by JILL Ferguson, LISW, Case Management on 6/30/2025 at 9:16 AM  [place] [phone]

## 2025-07-01 VITALS
DIASTOLIC BLOOD PRESSURE: 64 MMHG | OXYGEN SATURATION: 99 % | SYSTOLIC BLOOD PRESSURE: 102 MMHG | TEMPERATURE: 97.3 F | HEART RATE: 63 BPM | HEIGHT: 72 IN | WEIGHT: 315 LBS | RESPIRATION RATE: 14 BRPM | BODY MASS INDEX: 42.66 KG/M2

## 2025-07-01 LAB
ANION GAP SERPL CALCULATED.3IONS-SCNC: 11 MMOL/L (ref 3–16)
BACTERIA BLD CULT ORG #2: NORMAL
BACTERIA BLD CULT: NORMAL
BASOPHILS # BLD: 0 K/UL (ref 0–0.2)
BASOPHILS NFR BLD: 0.7 %
BUN SERPL-MCNC: 13 MG/DL (ref 7–20)
CALCIUM SERPL-MCNC: 8.6 MG/DL (ref 8.3–10.6)
CHLORIDE SERPL-SCNC: 99 MMOL/L (ref 99–110)
CO2 SERPL-SCNC: 25 MMOL/L (ref 21–32)
CREAT SERPL-MCNC: 0.8 MG/DL (ref 0.8–1.3)
DEPRECATED RDW RBC AUTO: 14.5 % (ref 12.4–15.4)
EOSINOPHIL # BLD: 0.3 K/UL (ref 0–0.6)
EOSINOPHIL NFR BLD: 4.5 %
GFR SERPLBLD CREATININE-BSD FMLA CKD-EPI: >90 ML/MIN/{1.73_M2}
GLUCOSE SERPL-MCNC: 127 MG/DL (ref 70–99)
HCT VFR BLD AUTO: 47 % (ref 40.5–52.5)
HGB BLD-MCNC: 15.9 G/DL (ref 13.5–17.5)
LYMPHOCYTES # BLD: 1.2 K/UL (ref 1–5.1)
LYMPHOCYTES NFR BLD: 17.1 %
MAGNESIUM SERPL-MCNC: 2.1 MG/DL (ref 1.8–2.4)
MCH RBC QN AUTO: 29.5 PG (ref 26–34)
MCHC RBC AUTO-ENTMCNC: 33.9 G/DL (ref 31–36)
MCV RBC AUTO: 87 FL (ref 80–100)
MONOCYTES # BLD: 0.8 K/UL (ref 0–1.3)
MONOCYTES NFR BLD: 12 %
NEUTROPHILS # BLD: 4.6 K/UL (ref 1.7–7.7)
NEUTROPHILS NFR BLD: 65.7 %
PLATELET # BLD AUTO: 212 K/UL (ref 135–450)
PMV BLD AUTO: 8.3 FL (ref 5–10.5)
POTASSIUM SERPL-SCNC: 3.5 MMOL/L (ref 3.5–5.1)
RBC # BLD AUTO: 5.4 M/UL (ref 4.2–5.9)
SODIUM SERPL-SCNC: 135 MMOL/L (ref 136–145)
WBC # BLD AUTO: 7.1 K/UL (ref 4–11)

## 2025-07-01 PROCEDURE — 80048 BASIC METABOLIC PNL TOTAL CA: CPT

## 2025-07-01 PROCEDURE — 2580000003 HC RX 258: Performed by: FAMILY MEDICINE

## 2025-07-01 PROCEDURE — 97530 THERAPEUTIC ACTIVITIES: CPT

## 2025-07-01 PROCEDURE — 94761 N-INVAS EAR/PLS OXIMETRY MLT: CPT

## 2025-07-01 PROCEDURE — 36415 COLL VENOUS BLD VENIPUNCTURE: CPT

## 2025-07-01 PROCEDURE — 6370000000 HC RX 637 (ALT 250 FOR IP): Performed by: FAMILY MEDICINE

## 2025-07-01 PROCEDURE — 2700000000 HC OXYGEN THERAPY PER DAY

## 2025-07-01 PROCEDURE — 85025 COMPLETE CBC W/AUTO DIFF WBC: CPT

## 2025-07-01 PROCEDURE — 6360000002 HC RX W HCPCS: Performed by: FAMILY MEDICINE

## 2025-07-01 PROCEDURE — 83735 ASSAY OF MAGNESIUM: CPT

## 2025-07-01 PROCEDURE — 6370000000 HC RX 637 (ALT 250 FOR IP): Performed by: INTERNAL MEDICINE

## 2025-07-01 RX ORDER — METRONIDAZOLE 500 MG/1
500 TABLET ORAL EVERY 8 HOURS SCHEDULED
Qty: 27 TABLET | Refills: 0 | Status: SHIPPED | OUTPATIENT
Start: 2025-07-01 | End: 2025-07-10

## 2025-07-01 RX ORDER — MINERAL OIL/HYDROPHIL PETROLAT
OINTMENT (GRAM) TOPICAL
Qty: 99 APPLICATION | Refills: 0 | Status: SHIPPED | OUTPATIENT
Start: 2025-07-01

## 2025-07-01 RX ORDER — LEVOFLOXACIN 750 MG/1
750 TABLET, FILM COATED ORAL EVERY 24 HOURS
Qty: 9 TABLET | Refills: 0 | Status: SHIPPED | OUTPATIENT
Start: 2025-07-01 | End: 2025-07-10

## 2025-07-01 RX ADMIN — GABAPENTIN 600 MG: 300 CAPSULE ORAL at 09:39

## 2025-07-01 RX ADMIN — CEFEPIME 2000 MG: 2 INJECTION, POWDER, FOR SOLUTION INTRAVENOUS at 01:55

## 2025-07-01 RX ADMIN — HYDROMORPHONE HYDROCHLORIDE 1 MG: 1 INJECTION, SOLUTION INTRAMUSCULAR; INTRAVENOUS; SUBCUTANEOUS at 01:59

## 2025-07-01 RX ADMIN — SERTRALINE 25 MG: 25 TABLET, FILM COATED ORAL at 09:40

## 2025-07-01 RX ADMIN — METRONIDAZOLE 500 MG: 500 TABLET ORAL at 06:11

## 2025-07-01 RX ADMIN — LISINOPRIL 10 MG: 10 TABLET ORAL at 09:40

## 2025-07-01 RX ADMIN — LEVOFLOXACIN 750 MG: 500 TABLET, FILM COATED ORAL at 01:43

## 2025-07-01 RX ADMIN — FLUCONAZOLE 200 MG: 100 TABLET ORAL at 09:39

## 2025-07-01 RX ADMIN — FUROSEMIDE 40 MG: 10 INJECTION, SOLUTION INTRAMUSCULAR; INTRAVENOUS at 09:41

## 2025-07-01 RX ADMIN — WHITE PETROLATUM: 1.75 OINTMENT TOPICAL at 09:00

## 2025-07-01 RX ADMIN — APIXABAN 5 MG: 5 TABLET, FILM COATED ORAL at 09:40

## 2025-07-01 RX ADMIN — CEFEPIME 2000 MG: 2 INJECTION, POWDER, FOR SOLUTION INTRAVENOUS at 10:07

## 2025-07-01 RX ADMIN — LEVOTHYROXINE SODIUM 50 MCG: 0.05 TABLET ORAL at 06:10

## 2025-07-01 RX ADMIN — Medication 1 CAPSULE: at 09:39

## 2025-07-01 ASSESSMENT — PAIN - FUNCTIONAL ASSESSMENT: PAIN_FUNCTIONAL_ASSESSMENT: PREVENTS OR INTERFERES SOME ACTIVE ACTIVITIES AND ADLS

## 2025-07-01 ASSESSMENT — PAIN DESCRIPTION - ORIENTATION: ORIENTATION: LEFT

## 2025-07-01 ASSESSMENT — PAIN SCALES - GENERAL: PAINLEVEL_OUTOF10: 8

## 2025-07-01 ASSESSMENT — PAIN DESCRIPTION - DESCRIPTORS: DESCRIPTORS: BURNING;STABBING

## 2025-07-01 ASSESSMENT — PAIN DESCRIPTION - LOCATION: LOCATION: LEG

## 2025-07-01 NOTE — DISCHARGE SUMMARY
V2.0  Discharge Summary    Name:  Calixto Steen /Age/Sex: 1955 (69 y.o. male)   Admit Date: 2025  Discharge Date: 25    MRN & CSN:  [id number] & [id number] Encounter Date and Time 25 7:08 PM EDT    Attending:  No att. providers found Discharging Provider: Gus Sheppard MD       Hospital Course:     Brief HPI: Calixto Steen is a 69 y.o. male  presented with pain, swelling of the left lower extremity    Brief Problem Based Course:   He was treated for the following    Left leg cellulitis with maggots  Chronic lymphedema, venous insufficiency with left extremity ulcer  - Treated with IV cefepime, flagy--discharged on oral levofloxacin plus Flagyl for 10 days        Continue wound care     Intertriginous dermatitis  - Continue miconazole cream        COPD without acute exacerbation  -  continue inhaled bronchioles and steroids     Morbid obesity:  BMI 49-Complicating All Aspects of Care, Maintain Low       Hypothyroidism:  continue synthroid  Chronic pain:  continue gabapentin      The patient expressed appropriate understanding of, and agreement with the discharge recommendations, medications, and plan.     Consults this admission:  IP CONSULT TO HOSPITALIST  PHARMACY TO DOSE VANCOMYCIN  IP CONSULT TO INFECTIOUS DISEASES  IP CONSULT TO PODIATRY  IP CONSULT TO VASCULAR SURGERY  IP CONSULT TO HOME CARE NEEDS    Discharge Diagnosis:   Cellulitis        Discharge Instruction:   Follow up appointments:   Primary care physician: Greer Hampton PA-C within 1 week  Diet: regular diet   Activity: activity as tolerated  Disposition: Discharged to:   []Home, []C, []SNF, []Acute Rehab, Hospice   Condition on discharge: Stable  Labs and Tests to be Followed up as an outpatient by PCP or Specialist:     Discharge Medications:        Medication List        START taking these medications      levoFLOXacin 750 MG tablet  Commonly known as: LEVAQUIN  Take 1 tablet by mouth every 24 hours for 9 doses    patient 50 minutes    Electronically signed by Gus Sheppard MD on 7/1/2025 at 7:08 PM

## 2025-07-01 NOTE — PROGRESS NOTES
[facility] - Physical Therapy   Phone: [phone]    Physical Therapy  Facility/Department:[address] ORTHOPEDICS    [] Initial Evaluation            [x] Daily Treatment Note         [] Discharge Summary      Patient: Calixto Steen   : 1955   MRN: [id number]   Date of Service:  2025  Staff Mobility Recommendation: rollator walker  supervision or close sba if IV in place    AM-PAC score:   Discharge Recommendations: anticipate Home   -with Home PT OT for short duration     Admitting Diagnosis: Cellulitis  Ordering Physician: Riky   Current Admission Summary: here due to worsening cellulitus bilateral LEs     Past Medical History:  has a past medical history of Cellulitis, Cerebrovascular accident (HCC), Chest pain, Chest pain, rule out acute myocardial infarction, Chronic deep vein thrombosis (DVT) of femoral vein of left lower extremity ([facility]), Chronic diastolic CHF (congestive heart failure) ([facility]), DM (diabetes mellitus), type 2 (HCC), HTN (hypertension), Neutrophilia, Pain of right lower extremity, and Sleep apnea.  Past Surgical History:  has no past surgical history on file.    Assessment  Activity Tolerance: Fair  Impairments Requiring Therapeutic Intervention: decreased functional mobility, decreased ADL status, decreased ROM, decreased strength, decreased balance  Prognosis: fair    Clinical Assessment:   -status 25  -states he is concerned about dressing changes at home - advised will inform  to determine if Home nursing available to him  -also states he does not have shoes that will fit his feet due to the swelling and the ACE wrap  -retrieved a pair of \"post op shoes\" that fit and have Velcro closures   - instructed how to don and doff   -would benefit from Home PT OT (not clear if he has coverage for this)   -he is able to mobilize from arm chair to standing at the rollator and is ambulatory for room distances to basic household distances   -he is capable to

## 2025-07-01 NOTE — CARE COORDINATION
DISCHARGE SUMMARY     DATE OF DISCHARGE: 7/1/2025    DISCHARGE DESTINATION: home      HOME CARE AGENCY:             Discharging to Facility/ Agency   Name: [facility]  Address:   [address], [facility] [address]  Phone:  [phone]  Fax:  [phone]    TRANSPORTATION:   Patient will find a ride from a friend    COMMENTS: Spoke with patient regarding home care. He is interested in home care and agreeable to an agency that can accept his insurance. He also reported that he does not feel ready for discharge today. Informed patient that he does have the right to appeal (IMM letter given to patient earlier). Patient stated he does not plan to appeal.   Spoke with Louise at [facility]--they can accept patient. She will pull orders from epic. Referral made through [facility]. Rn aware.     Electronically signed by JILL Ferguson, SANAZ, Case Management on 7/1/2025 at 12:29 PM  [place] [phone]

## 2025-07-01 NOTE — PROGRESS NOTES
CLINICAL PHARMACY NOTE: MEDS TO BEDS    Total # of Prescriptions Filled: 2   The following medications were delivered to the patient:  Current Discharge Medication List        START taking these medications    Details   levoFLOXacin (LEVAQUIN) 750 MG tablet Take 1 tablet by mouth every 24 hours for 9 doses  Qty: 9 tablet, Refills: 0                    metroNIDAZOLE (FLAGYL) 500 MG tablet Take 1 tablet by mouth every 8 hours for 9 days  Qty: 27 tablet, Refills: 0             Additional Documentation: Went over instructions with patient. Per patient request, aquaphor and zeasorb powder will be transferred to [facility] so he can use his Part D OTC debit card.

## 2025-07-01 NOTE — PLAN OF CARE
Problem: Chronic Conditions and Co-morbidities  Goal: Patient's chronic conditions and co-morbidity symptoms are monitored and maintained or improved  Outcome: Progressing  Flowsheets (Taken 7/1/2025 0303)  Care Plan - Patient's Chronic Conditions and Co-Morbidity Symptoms are Monitored and Maintained or Improved: Monitor and assess patient's chronic conditions and comorbid symptoms for stability, deterioration, or improvement     Problem: Discharge Planning  Goal: Discharge to home or other facility with appropriate resources  Outcome: Progressing  Flowsheets (Taken 7/1/2025 0303)  Discharge to home or other facility with appropriate resources:   Identify barriers to discharge with patient and caregiver   Identify discharge learning needs (meds, wound care, etc)     Problem: Pain  Goal: Verbalizes/displays adequate comfort level or baseline comfort level  Outcome: Progressing  Flowsheets (Taken 7/1/2025 0303)  Verbalizes/displays adequate comfort level or baseline comfort level:   Encourage patient to monitor pain and request assistance   Assess pain using appropriate pain scale     Problem: Skin/Tissue Integrity  Goal: Skin integrity remains intact  Description: 1.  Monitor for areas of redness and/or skin breakdown  2.  Assess vascular access sites hourly  3.  Every 4-6 hours minimum:  Change oxygen saturation probe site  4.  Every 4-6 hours:  If on nasal continuous positive airway pressure, respiratory therapy assess nares and determine need for appliance change or resting period  Outcome: Progressing  Flowsheets (Taken 7/1/2025 0303)  Skin Integrity Remains Intact:   Monitor for areas of redness and/or skin breakdown   Assess need for specialty bed     Problem: Safety - Adult  Goal: Free from fall injury  Outcome: Progressing  Flowsheets (Taken 7/1/2025 0303)  Free From Fall Injury: Instruct family/caregiver on patient safety     Problem: Neurosensory - Adult  Goal: Achieves stable or improved neurological  nausea and vomiting:   Administer IV fluids as ordered to ensure adequate hydration   Administer ordered antiemetic medications as needed

## 2025-07-01 NOTE — PROGRESS NOTES
IV discontinued without complications from left forearm . Discharge teaching done about activity,medications and follow-up care.   Electronically signed by Cassi Potter RN on 7/1/2025 at 2:35 PM

## 2025-07-01 NOTE — PLAN OF CARE
Problem: Chronic Conditions and Co-morbidities  Goal: Patient's chronic conditions and co-morbidity symptoms are monitored and maintained or improved  7/1/2025 0303 by Lisset Nix RN  Outcome: Progressing     Problem: Discharge Planning  Goal: Discharge to home or other facility with appropriate resources  7/1/2025 0303 by Lisset Nix RN  Outcome: Progressing     Problem: Skin/Tissue Integrity  Goal: Skin integrity remains intact  7/1/2025 0303 by Lisset Nix RN  Outcome: Progressing     Problem: Safety - Adult  Goal: Free from fall injury  7/1/2025 0303 by Lisset Nix RN  Outcome: Progressing     Problem: Neurosensory - Adult  Goal: Achieves stable or improved neurological status  7/1/2025 0303 by Lisset Nix RN  Outcome: Progressing     Problem: Neurosensory - Adult  Goal: Absence of seizures  7/1/2025 0303 by Lisset Nix RN  Outcome: Progressing     Problem: Neurosensory - Adult  Goal: Achieves maximal functionality and self care  7/1/2025 0303 by Lisset Nix RN  Outcome: Progressing     Problem: Cardiovascular - Adult  Goal: Maintains optimal cardiac output and hemodynamic stability  7/1/2025 0303 by Lisset Nix RN  Outcome: Progressing     Problem: Cardiovascular - Adult  Goal: Absence of cardiac dysrhythmias or at baseline  7/1/2025 0303 by Lisset Nix RN  Outcome: Progressing     Problem: Skin/Tissue Integrity - Adult  Goal: Skin integrity remains intact  7/1/2025 0303 by Lisset Nix RN  Outcome: Progressing

## 2025-07-01 NOTE — PROGRESS NOTES
Patient alert and oriented x 4. Respirations regular and unlabored on room air. Patient denies the need for pain medication at the present time. Nursing assessment completed. Fall/safety precautions in place. Call light in reach. Electronically signed by Cassi Potter RN on 7/1/2025 at 2:58 PM

## 2025-07-02 LAB
BACTERIA SPEC AEROBE CULT: ABNORMAL
BACTERIA SPEC ANAEROBE CULT: ABNORMAL
GRAM STN SPEC: ABNORMAL
ORGANISM: ABNORMAL

## 2025-07-08 NOTE — PROGRESS NOTES
Physician Progress Note      PATIENT:               CAROLEE ANGUIANO  [facility] #:                  [id number]  :                       1955  ADMIT DATE:       2025 5:51 AM  DISCH DATE:        2025 3:30 PM  RESPONDING  PROVIDER #:        Gus Sheppard MD          QUERY TEXT:    DM 2 is documented in the medical record 25 per ED provider, Podiatry   consult .  Please provide additional clinical indicators to support this   diagnosis on current admission or clarify current status.    The clinical indicators include:  -69 year old male PMHX Morbid obesity  Cellulitis , CVA,Chronic DVT of femoral   vein of left lower extremity, Chronic diastolic CHF DM type 2, HTN  -Glucose 109, 128, 113, 121, 127 on Ep1  -Hemoglobin A1C 6.1  -per medical record patient is on Jardiance  -DM 2 listed under past medical history  -Inpatient regular diet, no Accuchecks or SSI  Options provided:  -- DM 2 currently being treated/evaluated as evidenced by, Please document   supportive evidence.  -- No clinical evidence of DM 2 currently  -- DM 2 is PMH only  -- Other - I will add my own diagnosis  -- Disagree - Not applicable / Not valid  -- Disagree - Clinically unable to determine / Unknown  -- Refer to Clinical Documentation Reviewer    PROVIDER RESPONSE TEXT:    DM 2 is PMH only.    Query created by: Drea Ramos on 7/3/2025 10:54 AM      Electronically signed by:  Gus Sheppard MD 2025 11:21 AM

## 2025-07-21 NOTE — PROGRESS NOTES
Patient is A&Ox4. Patient has left the unit with transport for MRI. MRI checklist has been completed. Fall precautions are in place. VSS taken. AM meds given. Shift assessment completed. Will continue to monitor patient per unit protocols. Electronically signed by Laquita Schmid RN on 6/6/2024 at 10:25 AM     21-Jul-2025 23:09

## 2025-08-26 ENCOUNTER — TRANSCRIBE ORDERS (OUTPATIENT)
Dept: ADMINISTRATIVE | Age: 70
End: 2025-08-26

## 2025-08-26 DIAGNOSIS — Z12.2 ENCOUNTER FOR SCREENING FOR MALIGNANT NEOPLASM OF RESPIRATORY ORGANS: Primary | ICD-10-CM

## 2025-08-26 DIAGNOSIS — N39.0 FREQUENT UTI: Primary | ICD-10-CM

## 2025-08-26 DIAGNOSIS — F17.210 CIGARETTE NICOTINE DEPENDENCE WITHOUT COMPLICATION: ICD-10-CM
